# Patient Record
Sex: MALE | Race: WHITE | NOT HISPANIC OR LATINO | Employment: OTHER | URBAN - METROPOLITAN AREA
[De-identification: names, ages, dates, MRNs, and addresses within clinical notes are randomized per-mention and may not be internally consistent; named-entity substitution may affect disease eponyms.]

---

## 2017-12-21 ENCOUNTER — HOSPITAL ENCOUNTER (OUTPATIENT)
Facility: MEDICAL CENTER | Age: 77
DRG: 028 | End: 2017-12-21
Payer: MEDICARE

## 2017-12-21 ENCOUNTER — HOSPITAL ENCOUNTER (OUTPATIENT)
Dept: RADIOLOGY | Facility: MEDICAL CENTER | Age: 77
End: 2017-12-21

## 2017-12-21 ENCOUNTER — RESOLUTE PROFESSIONAL BILLING HOSPITAL PROF FEE (OUTPATIENT)
Dept: HOSPITALIST | Facility: MEDICAL CENTER | Age: 77
End: 2017-12-21
Payer: MEDICARE

## 2017-12-21 ENCOUNTER — APPOINTMENT (OUTPATIENT)
Dept: RADIOLOGY | Facility: MEDICAL CENTER | Age: 77
DRG: 028 | End: 2017-12-21
Attending: PHYSICIAN ASSISTANT
Payer: MEDICARE

## 2017-12-21 ENCOUNTER — HOSPITAL ENCOUNTER (INPATIENT)
Facility: MEDICAL CENTER | Age: 77
LOS: 2 days | DRG: 028 | End: 2017-12-25
Attending: HOSPITALIST | Admitting: HOSPITALIST
Payer: MEDICARE

## 2017-12-21 DIAGNOSIS — G95.89 CERVICAL CORD MYELOMALACIA (HCC): ICD-10-CM

## 2017-12-21 PROBLEM — Z87.891 SMOKING HISTORY: Status: ACTIVE | Noted: 2017-12-21

## 2017-12-21 PROBLEM — Z01.818 PREOPERATIVE EVALUATION TO RULE OUT SURGICAL CONTRAINDICATION: Status: ACTIVE | Noted: 2017-12-21

## 2017-12-21 LAB
ALBUMIN SERPL BCP-MCNC: 3.8 G/DL (ref 3.2–4.9)
ALBUMIN/GLOB SERPL: 1.5 G/DL
ALP SERPL-CCNC: 50 U/L (ref 30–99)
ALT SERPL-CCNC: 14 U/L (ref 2–50)
ANION GAP SERPL CALC-SCNC: 7 MMOL/L (ref 0–11.9)
APTT PPP: 28.1 SEC (ref 24.7–36)
AST SERPL-CCNC: 20 U/L (ref 12–45)
BASOPHILS # BLD AUTO: 0.5 % (ref 0–1.8)
BASOPHILS # BLD: 0.04 K/UL (ref 0–0.12)
BILIRUB SERPL-MCNC: 0.4 MG/DL (ref 0.1–1.5)
BUN SERPL-MCNC: 16 MG/DL (ref 8–22)
CALCIUM SERPL-MCNC: 9.7 MG/DL (ref 8.5–10.5)
CHLORIDE SERPL-SCNC: 109 MMOL/L (ref 96–112)
CO2 SERPL-SCNC: 26 MMOL/L (ref 20–33)
CREAT SERPL-MCNC: 0.7 MG/DL (ref 0.5–1.4)
EOSINOPHIL # BLD AUTO: 0.13 K/UL (ref 0–0.51)
EOSINOPHIL NFR BLD: 1.7 % (ref 0–6.9)
ERYTHROCYTE [DISTWIDTH] IN BLOOD BY AUTOMATED COUNT: 46.3 FL (ref 35.9–50)
GFR SERPL CREATININE-BSD FRML MDRD: >60 ML/MIN/1.73 M 2
GLOBULIN SER CALC-MCNC: 2.5 G/DL (ref 1.9–3.5)
GLUCOSE SERPL-MCNC: 154 MG/DL (ref 65–99)
HCT VFR BLD AUTO: 47.5 % (ref 42–52)
HGB BLD-MCNC: 16.5 G/DL (ref 14–18)
IMM GRANULOCYTES # BLD AUTO: 0.08 K/UL (ref 0–0.11)
IMM GRANULOCYTES NFR BLD AUTO: 1 % (ref 0–0.9)
INR PPP: 0.98 (ref 0.87–1.13)
LYMPHOCYTES # BLD AUTO: 1.64 K/UL (ref 1–4.8)
LYMPHOCYTES NFR BLD: 20.9 % (ref 22–41)
MAGNESIUM SERPL-MCNC: 1.8 MG/DL (ref 1.5–2.5)
MCH RBC QN AUTO: 33.5 PG (ref 27–33)
MCHC RBC AUTO-ENTMCNC: 34.7 G/DL (ref 33.7–35.3)
MCV RBC AUTO: 96.5 FL (ref 81.4–97.8)
MONOCYTES # BLD AUTO: 0.74 K/UL (ref 0–0.85)
MONOCYTES NFR BLD AUTO: 9.4 % (ref 0–13.4)
NEUTROPHILS # BLD AUTO: 5.21 K/UL (ref 1.82–7.42)
NEUTROPHILS NFR BLD: 66.5 % (ref 44–72)
NRBC # BLD AUTO: 0 K/UL
NRBC BLD-RTO: 0 /100 WBC
PHOSPHATE SERPL-MCNC: 3.4 MG/DL (ref 2.5–4.5)
PLATELET # BLD AUTO: 230 K/UL (ref 164–446)
PMV BLD AUTO: 9.3 FL (ref 9–12.9)
POTASSIUM SERPL-SCNC: 3.7 MMOL/L (ref 3.6–5.5)
PROT SERPL-MCNC: 6.3 G/DL (ref 6–8.2)
PROTHROMBIN TIME: 12.7 SEC (ref 12–14.6)
RBC # BLD AUTO: 4.92 M/UL (ref 4.7–6.1)
SODIUM SERPL-SCNC: 142 MMOL/L (ref 135–145)
WBC # BLD AUTO: 7.8 K/UL (ref 4.8–10.8)

## 2017-12-21 PROCEDURE — 85610 PROTHROMBIN TIME: CPT

## 2017-12-21 PROCEDURE — 93010 ELECTROCARDIOGRAM REPORT: CPT | Performed by: INTERNAL MEDICINE

## 2017-12-21 PROCEDURE — 36415 COLL VENOUS BLD VENIPUNCTURE: CPT

## 2017-12-21 PROCEDURE — 83735 ASSAY OF MAGNESIUM: CPT

## 2017-12-21 PROCEDURE — 85730 THROMBOPLASTIN TIME PARTIAL: CPT

## 2017-12-21 PROCEDURE — 80053 COMPREHEN METABOLIC PANEL: CPT

## 2017-12-21 PROCEDURE — 84100 ASSAY OF PHOSPHORUS: CPT

## 2017-12-21 PROCEDURE — 99220 PR INITIAL OBSERVATION CARE,LEVL III: CPT | Performed by: HOSPITALIST

## 2017-12-21 PROCEDURE — 85025 COMPLETE CBC W/AUTO DIFF WBC: CPT

## 2017-12-21 PROCEDURE — 93005 ELECTROCARDIOGRAM TRACING: CPT | Performed by: HOSPITALIST

## 2017-12-21 PROCEDURE — G0378 HOSPITAL OBSERVATION PER HR: HCPCS

## 2017-12-21 PROCEDURE — 700105 HCHG RX REV CODE 258: Performed by: HOSPITALIST

## 2017-12-21 PROCEDURE — 71020 DX-CHEST-2 VIEWS: CPT

## 2017-12-21 RX ORDER — AMOXICILLIN 250 MG
2 CAPSULE ORAL 2 TIMES DAILY
Status: DISCONTINUED | OUTPATIENT
Start: 2017-12-21 | End: 2017-12-22

## 2017-12-21 RX ORDER — ONDANSETRON 2 MG/ML
4 INJECTION INTRAMUSCULAR; INTRAVENOUS EVERY 4 HOURS PRN
Status: DISCONTINUED | OUTPATIENT
Start: 2017-12-21 | End: 2017-12-22

## 2017-12-21 RX ORDER — ACETAMINOPHEN 325 MG/1
650 TABLET ORAL EVERY 6 HOURS PRN
Status: DISCONTINUED | OUTPATIENT
Start: 2017-12-21 | End: 2017-12-25 | Stop reason: HOSPADM

## 2017-12-21 RX ORDER — BISACODYL 10 MG
10 SUPPOSITORY, RECTAL RECTAL
Status: DISCONTINUED | OUTPATIENT
Start: 2017-12-21 | End: 2017-12-22

## 2017-12-21 RX ORDER — OXYCODONE HYDROCHLORIDE 5 MG/1
5 TABLET ORAL
Status: DISCONTINUED | OUTPATIENT
Start: 2017-12-21 | End: 2017-12-25 | Stop reason: HOSPADM

## 2017-12-21 RX ORDER — POLYETHYLENE GLYCOL 3350 17 G/17G
1 POWDER, FOR SOLUTION ORAL
Status: DISCONTINUED | OUTPATIENT
Start: 2017-12-21 | End: 2017-12-22

## 2017-12-21 RX ORDER — ONDANSETRON 4 MG/1
4 TABLET, ORALLY DISINTEGRATING ORAL EVERY 4 HOURS PRN
Status: DISCONTINUED | OUTPATIENT
Start: 2017-12-21 | End: 2017-12-22

## 2017-12-21 RX ORDER — NIACIN 250 MG
250 TABLET ORAL DAILY
Status: ON HOLD | COMMUNITY
End: 2017-12-21

## 2017-12-21 RX ORDER — BISOPROLOL FUMARATE 5 MG/1
5 TABLET, FILM COATED ORAL DAILY
Status: DISCONTINUED | OUTPATIENT
Start: 2017-12-22 | End: 2017-12-25 | Stop reason: HOSPADM

## 2017-12-21 RX ORDER — FLUTICASONE PROPIONATE 50 MCG
2 SPRAY, SUSPENSION (ML) NASAL DAILY
Status: DISCONTINUED | OUTPATIENT
Start: 2017-12-22 | End: 2017-12-25 | Stop reason: HOSPADM

## 2017-12-21 RX ORDER — OXYCODONE HYDROCHLORIDE 5 MG/1
2.5 TABLET ORAL
Status: DISCONTINUED | OUTPATIENT
Start: 2017-12-21 | End: 2017-12-25 | Stop reason: HOSPADM

## 2017-12-21 RX ORDER — FINASTERIDE 5 MG/1
5 TABLET, FILM COATED ORAL DAILY
Status: DISCONTINUED | OUTPATIENT
Start: 2017-12-22 | End: 2017-12-25 | Stop reason: HOSPADM

## 2017-12-21 RX ORDER — MORPHINE SULFATE 4 MG/ML
2 INJECTION, SOLUTION INTRAMUSCULAR; INTRAVENOUS
Status: DISCONTINUED | OUTPATIENT
Start: 2017-12-21 | End: 2017-12-25 | Stop reason: HOSPADM

## 2017-12-21 RX ORDER — SODIUM CHLORIDE 9 MG/ML
INJECTION, SOLUTION INTRAVENOUS CONTINUOUS
Status: DISCONTINUED | OUTPATIENT
Start: 2017-12-21 | End: 2017-12-25

## 2017-12-21 RX ADMIN — SODIUM CHLORIDE: 9 INJECTION, SOLUTION INTRAVENOUS at 20:40

## 2017-12-21 ASSESSMENT — ENCOUNTER SYMPTOMS
TINGLING: 1
DIARRHEA: 0
CONSTIPATION: 0
NECK PAIN: 1
VOMITING: 0
NAUSEA: 0
HEADACHES: 0
COUGH: 0
WHEEZING: 0
BACK PAIN: 1
CHILLS: 0
SHORTNESS OF BREATH: 0
FEVER: 0

## 2017-12-21 ASSESSMENT — LIFESTYLE VARIABLES: EVER_SMOKED: YES

## 2017-12-21 ASSESSMENT — PAIN SCALES - GENERAL
PAINLEVEL_OUTOF10: 3
PAINLEVEL_OUTOF10: 9

## 2017-12-22 ENCOUNTER — APPOINTMENT (OUTPATIENT)
Dept: RADIOLOGY | Facility: MEDICAL CENTER | Age: 77
DRG: 028 | End: 2017-12-22
Attending: NEUROLOGICAL SURGERY
Payer: MEDICARE

## 2017-12-22 LAB
APTT PPP: 28.4 SEC (ref 24.7–36)
EKG IMPRESSION: NORMAL
INR PPP: 1.05 (ref 0.87–1.13)
PROTHROMBIN TIME: 13.4 SEC (ref 12–14.6)

## 2017-12-22 PROCEDURE — A6402 STERILE GAUZE <= 16 SQ IN: HCPCS | Performed by: NEUROLOGICAL SURGERY

## 2017-12-22 PROCEDURE — 700111 HCHG RX REV CODE 636 W/ 250 OVERRIDE (IP)

## 2017-12-22 PROCEDURE — 700105 HCHG RX REV CODE 258: Performed by: HOSPITALIST

## 2017-12-22 PROCEDURE — A9270 NON-COVERED ITEM OR SERVICE: HCPCS

## 2017-12-22 PROCEDURE — 95939 C MOTOR EVOKED UPR&LWR LIMBS: CPT | Performed by: NEUROLOGICAL SURGERY

## 2017-12-22 PROCEDURE — 160035 HCHG PACU - 1ST 60 MINS PHASE I: Performed by: NEUROLOGICAL SURGERY

## 2017-12-22 PROCEDURE — 95940 IONM IN OPERATNG ROOM 15 MIN: CPT | Performed by: NEUROLOGICAL SURGERY

## 2017-12-22 PROCEDURE — 700101 HCHG RX REV CODE 250: Performed by: PHYSICIAN ASSISTANT

## 2017-12-22 PROCEDURE — 95861 NEEDLE EMG 2 EXTREMITIES: CPT | Performed by: NEUROLOGICAL SURGERY

## 2017-12-22 PROCEDURE — 72040 X-RAY EXAM NECK SPINE 2-3 VW: CPT

## 2017-12-22 PROCEDURE — 36415 COLL VENOUS BLD VENIPUNCTURE: CPT

## 2017-12-22 PROCEDURE — A9270 NON-COVERED ITEM OR SERVICE: HCPCS | Performed by: PHYSICIAN ASSISTANT

## 2017-12-22 PROCEDURE — 502000 HCHG MISC OR IMPLANTS RC 0278: Performed by: NEUROLOGICAL SURGERY

## 2017-12-22 PROCEDURE — 500367 HCHG DRAIN KIT, HEMOVAC: Performed by: NEUROLOGICAL SURGERY

## 2017-12-22 PROCEDURE — 160031 HCHG SURGERY MINUTES - 1ST 30 MINS LEVEL 5: Performed by: NEUROLOGICAL SURGERY

## 2017-12-22 PROCEDURE — A4450 NON-WATERPROOF TAPE: HCPCS | Performed by: NEUROLOGICAL SURGERY

## 2017-12-22 PROCEDURE — 160009 HCHG ANES TIME/MIN: Performed by: NEUROLOGICAL SURGERY

## 2017-12-22 PROCEDURE — 160036 HCHG PACU - EA ADDL 30 MINS PHASE I: Performed by: NEUROLOGICAL SURGERY

## 2017-12-22 PROCEDURE — 501838 HCHG SUTURE GENERAL: Performed by: NEUROLOGICAL SURGERY

## 2017-12-22 PROCEDURE — 160042 HCHG SURGERY MINUTES - EA ADDL 1 MIN LEVEL 5: Performed by: NEUROLOGICAL SURGERY

## 2017-12-22 PROCEDURE — 700102 HCHG RX REV CODE 250 W/ 637 OVERRIDE(OP)

## 2017-12-22 PROCEDURE — 95927 SOMATOSENSORY TESTING: CPT | Performed by: NEUROLOGICAL SURGERY

## 2017-12-22 PROCEDURE — 700101 HCHG RX REV CODE 250

## 2017-12-22 PROCEDURE — 160002 HCHG RECOVERY MINUTES (STAT): Performed by: NEUROLOGICAL SURGERY

## 2017-12-22 PROCEDURE — 0RG1071 FUSION OF CERVICAL VERTEBRAL JOINT WITH AUTOLOGOUS TISSUE SUBSTITUTE, POSTERIOR APPROACH, POSTERIOR COLUMN, OPEN APPROACH: ICD-10-PCS | Performed by: NEUROLOGICAL SURGERY

## 2017-12-22 PROCEDURE — 160048 HCHG OR STATISTICAL LEVEL 1-5: Performed by: NEUROLOGICAL SURGERY

## 2017-12-22 PROCEDURE — 502633 HCHG SCREW, VERTEX AXIAL: Performed by: NEUROLOGICAL SURGERY

## 2017-12-22 PROCEDURE — G0378 HOSPITAL OBSERVATION PER HR: HCPCS

## 2017-12-22 PROCEDURE — 700102 HCHG RX REV CODE 250 W/ 637 OVERRIDE(OP): Performed by: HOSPITALIST

## 2017-12-22 PROCEDURE — 95938 SOMATOSENSORY TESTING: CPT | Performed by: NEUROLOGICAL SURGERY

## 2017-12-22 PROCEDURE — 99225 PR SUBSEQUENT OBSERVATION CARE,LEVEL II: CPT | Performed by: HOSPITALIST

## 2017-12-22 PROCEDURE — 500331 HCHG COTTONOID, SURG PATTIE: Performed by: NEUROLOGICAL SURGERY

## 2017-12-22 PROCEDURE — 95937 NEUROMUSCULAR JUNCTION TEST: CPT | Performed by: NEUROLOGICAL SURGERY

## 2017-12-22 PROCEDURE — 85610 PROTHROMBIN TIME: CPT

## 2017-12-22 PROCEDURE — 500122 HCHG BOVIE, BLADE: Performed by: NEUROLOGICAL SURGERY

## 2017-12-22 PROCEDURE — 501317 HCHG SCREW, VERTEX SET: Performed by: NEUROLOGICAL SURGERY

## 2017-12-22 PROCEDURE — 110454 HCHG SHELL REV 250: Performed by: NEUROLOGICAL SURGERY

## 2017-12-22 PROCEDURE — 500885 HCHG PACK, JACKSON TABLE: Performed by: NEUROLOGICAL SURGERY

## 2017-12-22 PROCEDURE — A6222 GAUZE <=16 IN NO W/SAL W/O B: HCPCS | Performed by: NEUROLOGICAL SURGERY

## 2017-12-22 PROCEDURE — 110371 HCHG SHELL REV 272: Performed by: NEUROLOGICAL SURGERY

## 2017-12-22 PROCEDURE — A4314 CATH W/DRAINAGE 2-WAY LATEX: HCPCS | Performed by: NEUROLOGICAL SURGERY

## 2017-12-22 PROCEDURE — 700111 HCHG RX REV CODE 636 W/ 250 OVERRIDE (IP): Performed by: PHYSICIAN ASSISTANT

## 2017-12-22 PROCEDURE — 00NW0ZZ RELEASE CERVICAL SPINAL CORD, OPEN APPROACH: ICD-10-PCS | Performed by: NEUROLOGICAL SURGERY

## 2017-12-22 PROCEDURE — 700102 HCHG RX REV CODE 250 W/ 637 OVERRIDE(OP): Performed by: PHYSICIAN ASSISTANT

## 2017-12-22 PROCEDURE — 85730 THROMBOPLASTIN TIME PARTIAL: CPT

## 2017-12-22 PROCEDURE — A9270 NON-COVERED ITEM OR SERVICE: HCPCS | Performed by: HOSPITALIST

## 2017-12-22 PROCEDURE — 500444 HCHG HEMOSTAT, SURGICEL 2X3: Performed by: NEUROLOGICAL SURGERY

## 2017-12-22 DEVICE — ROD PRE-CUT/BENT 3.5MM X 25MM (1TCONX2=2): Type: IMPLANTABLE DEVICE | Status: FUNCTIONAL

## 2017-12-22 DEVICE — IMPLANTABLE DEVICE: Type: IMPLANTABLE DEVICE | Status: FUNCTIONAL

## 2017-12-22 DEVICE — SCREW SET VERTEX (1TCONX30=30): Type: IMPLANTABLE DEVICE | Status: FUNCTIONAL

## 2017-12-22 DEVICE — SCREW MAS VERTEX 3.5X14MM (1TCONX16=16): Type: IMPLANTABLE DEVICE | Status: FUNCTIONAL

## 2017-12-22 RX ORDER — CYCLOBENZAPRINE HCL 10 MG
10 TABLET ORAL EVERY 8 HOURS PRN
Status: DISCONTINUED | OUTPATIENT
Start: 2017-12-22 | End: 2017-12-25 | Stop reason: HOSPADM

## 2017-12-22 RX ORDER — AMOXICILLIN 250 MG
1 CAPSULE ORAL
Status: DISCONTINUED | OUTPATIENT
Start: 2017-12-22 | End: 2017-12-25 | Stop reason: HOSPADM

## 2017-12-22 RX ORDER — LABETALOL HYDROCHLORIDE 5 MG/ML
INJECTION, SOLUTION INTRAVENOUS
Status: COMPLETED
Start: 2017-12-22 | End: 2017-12-22

## 2017-12-22 RX ORDER — SODIUM CHLORIDE 9 MG/ML
INJECTION, SOLUTION INTRAVENOUS
Status: DISCONTINUED | OUTPATIENT
Start: 2017-12-22 | End: 2017-12-22 | Stop reason: HOSPADM

## 2017-12-22 RX ORDER — DIPHENHYDRAMINE HCL 25 MG
25 TABLET ORAL EVERY 6 HOURS PRN
Status: DISCONTINUED | OUTPATIENT
Start: 2017-12-22 | End: 2017-12-25 | Stop reason: HOSPADM

## 2017-12-22 RX ORDER — SODIUM CHLORIDE, SODIUM LACTATE, POTASSIUM CHLORIDE, AND CALCIUM CHLORIDE .6; .31; .03; .02 G/100ML; G/100ML; G/100ML; G/100ML
IRRIGANT IRRIGATION
Status: DISCONTINUED | OUTPATIENT
Start: 2017-12-22 | End: 2017-12-22 | Stop reason: HOSPADM

## 2017-12-22 RX ORDER — ENEMA 19; 7 G/133ML; G/133ML
1 ENEMA RECTAL
Status: DISCONTINUED | OUTPATIENT
Start: 2017-12-22 | End: 2017-12-25 | Stop reason: HOSPADM

## 2017-12-22 RX ORDER — DOCUSATE SODIUM 100 MG/1
100 CAPSULE, LIQUID FILLED ORAL 2 TIMES DAILY
Status: DISCONTINUED | OUTPATIENT
Start: 2017-12-22 | End: 2017-12-25 | Stop reason: HOSPADM

## 2017-12-22 RX ORDER — DIPHENHYDRAMINE HYDROCHLORIDE 50 MG/ML
25 INJECTION INTRAMUSCULAR; INTRAVENOUS EVERY 6 HOURS PRN
Status: DISCONTINUED | OUTPATIENT
Start: 2017-12-22 | End: 2017-12-25 | Stop reason: HOSPADM

## 2017-12-22 RX ORDER — HYDROCODONE BITARTRATE AND ACETAMINOPHEN 10; 325 MG/1; MG/1
1-2 TABLET ORAL EVERY 4 HOURS PRN
Status: DISCONTINUED | OUTPATIENT
Start: 2017-12-22 | End: 2017-12-25 | Stop reason: HOSPADM

## 2017-12-22 RX ORDER — SODIUM CHLORIDE AND POTASSIUM CHLORIDE 150; 900 MG/100ML; MG/100ML
INJECTION, SOLUTION INTRAVENOUS CONTINUOUS
Status: DISCONTINUED | OUTPATIENT
Start: 2017-12-22 | End: 2017-12-25

## 2017-12-22 RX ORDER — BISACODYL 10 MG
10 SUPPOSITORY, RECTAL RECTAL
Status: DISCONTINUED | OUTPATIENT
Start: 2017-12-22 | End: 2017-12-25 | Stop reason: HOSPADM

## 2017-12-22 RX ORDER — ACETAMINOPHEN 500 MG
500 TABLET ORAL EVERY 6 HOURS
Status: DISCONTINUED | OUTPATIENT
Start: 2017-12-22 | End: 2017-12-25 | Stop reason: HOSPADM

## 2017-12-22 RX ORDER — OXYCODONE AND ACETAMINOPHEN 10; 325 MG/1; MG/1
1-2 TABLET ORAL EVERY 6 HOURS PRN
Status: DISCONTINUED | OUTPATIENT
Start: 2017-12-22 | End: 2017-12-25 | Stop reason: HOSPADM

## 2017-12-22 RX ORDER — ONDANSETRON 4 MG/1
4 TABLET, ORALLY DISINTEGRATING ORAL EVERY 4 HOURS PRN
Status: DISCONTINUED | OUTPATIENT
Start: 2017-12-22 | End: 2017-12-25 | Stop reason: HOSPADM

## 2017-12-22 RX ORDER — MORPHINE SULFATE 4 MG/ML
2 INJECTION, SOLUTION INTRAMUSCULAR; INTRAVENOUS EVERY 4 HOURS PRN
Status: DISCONTINUED | OUTPATIENT
Start: 2017-12-22 | End: 2017-12-25 | Stop reason: HOSPADM

## 2017-12-22 RX ORDER — BUPIVACAINE HYDROCHLORIDE AND EPINEPHRINE 5; 5 MG/ML; UG/ML
INJECTION, SOLUTION EPIDURAL; INTRACAUDAL; PERINEURAL
Status: DISCONTINUED | OUTPATIENT
Start: 2017-12-22 | End: 2017-12-22 | Stop reason: HOSPADM

## 2017-12-22 RX ORDER — CLONIDINE HYDROCHLORIDE 0.1 MG/1
0.1 TABLET ORAL EVERY 4 HOURS PRN
Status: DISCONTINUED | OUTPATIENT
Start: 2017-12-22 | End: 2017-12-25 | Stop reason: HOSPADM

## 2017-12-22 RX ORDER — OXYCODONE HCL 5 MG/5 ML
SOLUTION, ORAL ORAL
Status: COMPLETED
Start: 2017-12-22 | End: 2017-12-22

## 2017-12-22 RX ORDER — POLYETHYLENE GLYCOL 3350 17 G/17G
1 POWDER, FOR SOLUTION ORAL 2 TIMES DAILY PRN
Status: DISCONTINUED | OUTPATIENT
Start: 2017-12-22 | End: 2017-12-25 | Stop reason: HOSPADM

## 2017-12-22 RX ORDER — TEMAZEPAM 15 MG/1
15 CAPSULE ORAL
Status: DISCONTINUED | OUTPATIENT
Start: 2017-12-23 | End: 2017-12-25 | Stop reason: HOSPADM

## 2017-12-22 RX ORDER — CEFAZOLIN SODIUM 2 G/100ML
2 INJECTION, SOLUTION INTRAVENOUS EVERY 8 HOURS
Status: COMPLETED | OUTPATIENT
Start: 2017-12-22 | End: 2017-12-24

## 2017-12-22 RX ORDER — BACITRACIN ZINC 500 [USP'U]/G
OINTMENT TOPICAL
Status: DISCONTINUED | OUTPATIENT
Start: 2017-12-22 | End: 2017-12-22 | Stop reason: HOSPADM

## 2017-12-22 RX ORDER — CALCIUM CARBONATE 500 MG/1
500 TABLET, CHEWABLE ORAL 2 TIMES DAILY
Status: DISCONTINUED | OUTPATIENT
Start: 2017-12-22 | End: 2017-12-25 | Stop reason: HOSPADM

## 2017-12-22 RX ORDER — ONDANSETRON 2 MG/ML
4 INJECTION INTRAMUSCULAR; INTRAVENOUS EVERY 4 HOURS PRN
Status: DISCONTINUED | OUTPATIENT
Start: 2017-12-22 | End: 2017-12-25 | Stop reason: HOSPADM

## 2017-12-22 RX ORDER — AMOXICILLIN 250 MG
1 CAPSULE ORAL NIGHTLY
Status: DISCONTINUED | OUTPATIENT
Start: 2017-12-22 | End: 2017-12-25 | Stop reason: HOSPADM

## 2017-12-22 RX ORDER — BACITRACIN 50000 [IU]/1
INJECTION, POWDER, FOR SOLUTION INTRAMUSCULAR
Status: DISCONTINUED | OUTPATIENT
Start: 2017-12-22 | End: 2017-12-22 | Stop reason: HOSPADM

## 2017-12-22 RX ADMIN — CEFAZOLIN SODIUM 2 G: 2 INJECTION, SOLUTION INTRAVENOUS at 22:47

## 2017-12-22 RX ADMIN — POTASSIUM CHLORIDE AND SODIUM CHLORIDE: 900; 150 INJECTION, SOLUTION INTRAVENOUS at 20:50

## 2017-12-22 RX ADMIN — BISOPROLOL FUMARATE 5 MG: 5 TABLET, COATED ORAL at 08:09

## 2017-12-22 RX ADMIN — OXYCODONE HYDROCHLORIDE 10 MG: 5 SOLUTION ORAL at 19:45

## 2017-12-22 RX ADMIN — ACETAMINOPHEN 500 MG: 500 TABLET ORAL at 20:55

## 2017-12-22 RX ADMIN — FINASTERIDE 5 MG: 5 TABLET, FILM COATED ORAL at 08:09

## 2017-12-22 RX ADMIN — SODIUM CHLORIDE: 9 INJECTION, SOLUTION INTRAVENOUS at 08:14

## 2017-12-22 RX ADMIN — FLUTICASONE PROPIONATE 100 MCG: 50 SPRAY, METERED NASAL at 08:08

## 2017-12-22 RX ADMIN — ANTACID TABLETS 500 MG: 500 TABLET, CHEWABLE ORAL at 20:57

## 2017-12-22 RX ADMIN — CHOLECALCIFEROL TAB 25 MCG (1000 UNIT) 5000 UNITS: 25 TAB at 20:54

## 2017-12-22 RX ADMIN — FENTANYL CITRATE 25 MCG: 50 INJECTION, SOLUTION INTRAMUSCULAR; INTRAVENOUS at 19:48

## 2017-12-22 ASSESSMENT — PAIN SCALES - GENERAL
PAINLEVEL_OUTOF10: 5
PAINLEVEL_OUTOF10: 2
PAINLEVEL_OUTOF10: 5
PAINLEVEL_OUTOF10: 2
PAINLEVEL_OUTOF10: 5
PAINLEVEL_OUTOF10: 2
PAINLEVEL_OUTOF10: 5
PAINLEVEL_OUTOF10: 2
PAINLEVEL_OUTOF10: 2

## 2017-12-22 ASSESSMENT — ENCOUNTER SYMPTOMS
NECK PAIN: 1
NEUROLOGICAL NEGATIVE: 1
PSYCHIATRIC NEGATIVE: 1
MYALGIAS: 1
CONSTITUTIONAL NEGATIVE: 1
CARDIOVASCULAR NEGATIVE: 1
EYES NEGATIVE: 1

## 2017-12-22 NOTE — ASSESSMENT & PLAN NOTE
- Does have a 20-pack-year smoking history  - Labs today are unremarkable  - No significant cardiac history other than hypertension and hyperlipidemia  - Patient is low to moderate risk for moderate risk surgery

## 2017-12-22 NOTE — PROGRESS NOTES
Neurosurgery Progress Note    HPI:   Bartolo is a very pleasant right handed 78 yo male who presented to our neurosurgical clinic on 17 after experiencing one month of neck pain, progressive numbness and tingling in his bilateral hands and feet, weakness in his bilateral hands, and difficulty with walking. He states that symptoms began approximately one month ago with neck pain, left shoulder pain, and left scapular pain. This quickly progressed and about 10 days ago he started dropping things out of his hands, he had difficulty walking and required the use of a front wheel walker for assistance with ambulation. Cervical MRI shows severe myelomalacia. He has not seen his primary care physician in some time, therefore we requested for hospitalist admission for medical clearance prior to urgent cervical surgery to be done today. This will include C3-C6 laminectomy, instrumentation, and fusion.    Subjective:  NPO since midnight.   Reports numbness/tingling in bilateral hands and toes, neck pain.   No fevers, chill, nausea, vomitng, SOB, chest pain.    Exam:  VSS  A&Ox4, NAD   Trachea midline  No nuchal rigidity   NM: 4+/5 deltoid, biceps, triceps, 4/5 right handgrip, 4-/5 left handgrip, 4-/5 intrinsics bilaterally.  Sensation intact and equal throughout all four extremities.   Abdomen: soft, non-tender  Pulmonary: non-labored breathing on room air, normal respiratory effort  No LE edema, erythema, cyanosis, clubbing  Calves non-tender to compression bilat      BP  Min: 113/54  Max: 158/97  Pulse  Av.8  Min: 57  Max: 65  Resp  Av.8  Min: 16  Max: 20  Temp  Av.8 °C (98.2 °F)  Min: 36.3 °C (97.4 °F)  Max: 37.2 °C (98.9 °F)  SpO2  Av.8 %  Min: 90 %  Max: 93 %    No Data Recorded    Recent Labs      17   WBC  7.8   RBC  4.92   HEMOGLOBIN  16.5   HEMATOCRIT  47.5   MCV  96.5   MCH  33.5*   MCHC  34.7   RDW  46.3   PLATELETCT  230   MPV  9.3     Recent Labs      17   SODIUM   142   POTASSIUM  3.7   CHLORIDE  109   CO2  26   GLUCOSE  154*   BUN  16     Recent Labs      12/21/17   1938  12/22/17   0359   APTT  28.1  28.4   INR  0.98  1.05           Intake/Output       12/21/17 0700 - 12/22/17 0659 12/22/17 0700 - 12/23/17 0659      4634-1169 1071-3688 Total 0992-5170 8598-2707 Total       Intake    P.O.  --  120 120  --  -- --    P.O. -- 120 120 -- -- --    I.V.  --  830 830  --  -- --    IV Volume (< Enter Name Here >) -- 830 830 -- -- --    Total Intake -- 950 950 -- -- --       Output    Total Output -- -- -- -- -- --       Net I/O     -- 950 950 -- -- --            Intake/Output Summary (Last 24 hours) at 12/22/17 0816  Last data filed at 12/22/17 0500   Gross per 24 hour   Intake              950 ml   Output                0 ml   Net              950 ml            • fluticasone  2 Spray DAILY   • finasteride  5 mg DAILY   • bisoprolol  5 mg DAILY   • senna-docusate  2 Tab BID    And   • polyethylene glycol/lytes  1 Packet QDAY PRN    And   • magnesium hydroxide  30 mL QDAY PRN    And   • bisacodyl  10 mg QDAY PRN   • NS   Continuous   • ondansetron  4 mg Q4HRS PRN   • ondansetron  4 mg Q4HRS PRN   • acetaminophen  650 mg Q6HRS PRN   • Pharmacy Consult Request   PRN    And   • oxycodone immediate-release  2.5 mg Q3HRS PRN    And   • oxycodone immediate-release  5 mg Q3HRS PRN    And   • morphine injection  2 mg Q3HRS PRN       Assessment and Plan:  Hospital day #2  Prophylactic anticoagulation: no    Plan:   Labs reviewed- coags normal, CBC/BMP WNL.  ECG- sinus bradycardia with RBBB/LAFB. Will be available for Aa.  CXR with some lung hyperinflation, otherwise normal.   Consent obtained for C3-C6 laminectomy, instrumentation, and fusion.  Continue NPO until surgery this afternoon.   Continue with pain control.     Case discussed with Dr. Forbes.

## 2017-12-22 NOTE — CARE PLAN
Problem: Safety  Goal: Will remain free from injury  Outcome: PROGRESSING AS EXPECTED  Call light in reach,bed in low position, brakes locked. No new injuries observed.

## 2017-12-22 NOTE — PROGRESS NOTES
[facility] Hospitalist Progress Note    Date of Service: 2017    Chief Complaint  77 y.o. male admitted 2017 with neck pain    Interval Problem Update  Npo    For neck surgery today    Neck pain, intensity 5/10, spasm, sharp.    afebrile    Consultants/Specialty  Dr garcia    Disposition  pending        Review of Systems   Constitutional: Negative.    HENT: Negative.    Eyes: Negative.    Cardiovascular: Negative.    Musculoskeletal: Positive for myalgias and neck pain.   Skin: Negative.    Neurological: Negative.    Psychiatric/Behavioral: Negative.       Physical Exam  Laboratory/Imaging   Hemodynamics  Temp (24hrs), Av.7 °C (98.1 °F), Min:36.3 °C (97.4 °F), Max:37.2 °C (98.9 °F)   Temperature: 36.7 °C (98 °F)  Pulse  Av  Min: 57  Max: 65    Blood Pressure : 137/87      Respiratory      Respiration: 19, Pulse Oximetry: 92 %             Fluids    Intake/Output Summary (Last 24 hours) at 17 1103  Last data filed at 17 0500   Gross per 24 hour   Intake              950 ml   Output                0 ml   Net              950 ml       Nutrition  Orders Placed This Encounter   Procedures   • DIET NPO     Standing Status:   Standing     Number of Occurrences:   1     Order Specific Question:   Restrict to:     Answer:   Sips with Medications [3]     Physical Exam   Constitutional: He is oriented to person, place, and time. He appears distressed.   Eyes: Left eye exhibits no discharge.   Neck: No JVD present. No tracheal deviation present. No thyromegaly present.   Cardiovascular: Normal rate.  Exam reveals no gallop and no friction rub.    No murmur heard.  Pulmonary/Chest: Breath sounds normal. No respiratory distress. He has no wheezes. He has no rales.   Abdominal: Soft. Bowel sounds are normal. He exhibits no distension. There is no tenderness. There is no guarding.   Musculoskeletal: He exhibits no edema or deformity.   Neurological: He is alert and oriented to person, place, and time. No  cranial nerve deficit. Coordination normal.   Skin: He is not diaphoretic.       Recent Labs      12/21/17 1938   WBC  7.8   RBC  4.92   HEMOGLOBIN  16.5   HEMATOCRIT  47.5   MCV  96.5   MCH  33.5*   MCHC  34.7   RDW  46.3   PLATELETCT  230   MPV  9.3     Recent Labs      12/21/17 1938   SODIUM  142   POTASSIUM  3.7   CHLORIDE  109   CO2  26   GLUCOSE  154*   BUN  16   CREATININE  0.70   CALCIUM  9.7     Recent Labs      12/21/17 1938 12/22/17 0359   APTT  28.1  28.4   INR  0.98  1.05                  Assessment/Plan     * Cervical cord myelomalacia (CMS-HCC)   Assessment & Plan    - Neurosurg Song consulted  - Nothing by mouth midnight for cervical spinal fusion and decompression today  - Hold aspirin and other anti-coagulation        Preoperative evaluation to rule out surgical contraindication- (present on admission)   Assessment & Plan    - Does have a 20-pack-year smoking history  - Labs today are unremarkable  - No significant cardiac history other than hypertension and hyperlipidemia  - Patient is low to moderate risk for moderate risk surgery        Smoking history- (present on admission)   Assessment & Plan    - 20-pack-year smoking history  - No longer smokes          Quality-Core Measures    Check a m cbc, bmp

## 2017-12-22 NOTE — CARE PLAN
Problem: Communication  Goal: The ability to communicate needs accurately and effectively will improve  Outcome: PROGRESSING AS EXPECTED  Pt updated about POC- surgery this afternoon.    Problem: Safety  Goal: Will remain free from injury  Outcome: PROGRESSING AS EXPECTED  Bed alarm refused, A&O x4, slipper socks, bed locked and in low position, call light and personal belongings with reach, report given to CNA, appropriate signs outside door, hourly rounding in place.     Problem: Pain Management  Goal: Pain level will decrease to patient's comfort goal  Outcome: PROGRESSING AS EXPECTED  Pt rates pain 2/10. Pt denies med.

## 2017-12-22 NOTE — H&P
Hospital Medicine History and Physical      Date of Service  12/21/2017    Chief Complaint  No chief complaint on file.  Severe myelomalacia of the cervical spine    History of Presenting Illness  Jose is a 77 y.o. male w/h/o hypertension and hyperlipidemia who presents with severe myelomalacia of the cervical spine. Patient has been having pain in his hands and neck back and feet for the past several months. It got worse over the past three weeks. He eventually went to see Dr. garcia from neurosurgery who recommended that the patient have a urgent cervical spine fusion/decompression. Thus hospitalist was consulted for direct admission.    Primary Care Physician  CAROL Haile D.O.    Outpatient nsg  Dr. Garcia    Code Status  Full code per patient with family present    Review of Systems  Review of Systems   Constitutional: Negative for chills and fever.   Respiratory: Negative for cough, shortness of breath and wheezing.    Cardiovascular: Negative for chest pain.   Gastrointestinal: Negative for constipation, diarrhea, nausea and vomiting.   Musculoskeletal: Positive for back pain, joint pain and neck pain.   Neurological: Positive for tingling. Negative for headaches.     Please see HPI, all other systems were reviewed and are negative (AMA/CMS criteria)     Past Medical History  Past Medical History:   Diagnosis Date   • Hyperlipidemia    • Hypertension        Surgical History  Past Surgical History:   Procedure Laterality Date   • OTHER ORTHOPEDIC SURGERY         Medications  No current facility-administered medications on file prior to encounter.      Current Outpatient Prescriptions on File Prior to Encounter   Medication Sig Dispense Refill   • bisoprolol (ZEBETA) 5 MG TABS Take 5 mg by mouth every day.     • finasteride (PROSCAR) 5 MG TABS Take 5 mg by mouth every day.     • APAP-Salicylamide-Phenyltolox (/200/20 PO) Take  by mouth.     • aspirin (ASA) 81 MG CHEW Take 81 mg by mouth every day.      • fluticasone (FLONASE) 50 MCG/ACT nasal spray Spray 2 Sprays in nose every day. Each Nostril 16 g 11     Family History  No family history on file.    Social History  Social History   Substance Use Topics   • Smoking status: Former Smoker     Packs/day: 0.00     Years: 30.00     Quit date: 2017   • Smokeless tobacco: Former User   • Alcohol use Yes       Allergies  No Known Allergies     Physical Exam  Laboratory   Hemodynamics  Temp (24hrs), Av.8 °C (98.2 °F), Min:36.4 °C (97.6 °F), Max:37.1 °C (98.8 °F)   Temperature: 37.1 °C (98.8 °F)  Pulse  Av.5  Min: 60  Max: 65    Blood Pressure : 135/80      Respiratory      Respiration: 18, Pulse Oximetry: 90 %             Physical Exam   Constitutional: He is oriented to person, place, and time. He appears well-developed and well-nourished.   HENT:   Head: Normocephalic.   Right Ear: External ear normal.   Left Ear: External ear normal.   Nose: Nose normal.   Eyes: Conjunctivae and EOM are normal. Pupils are equal, round, and reactive to light. Right eye exhibits no discharge. Left eye exhibits no discharge. No scleral icterus.   Neck: Neck supple. No tracheal deviation present.   Cardiovascular: Normal rate.  Exam reveals no gallop and no friction rub.    Pulmonary/Chest: No respiratory distress. He has no wheezes. He has no rales.   Abdominal: Soft. He exhibits no distension. There is no tenderness. There is no rebound and no guarding.   Musculoskeletal: He exhibits no edema.   Neurological: He is alert and oriented to person, place, and time.   Skin: Skin is warm and dry. No rash noted. No erythema.   Psychiatric: He has a normal mood and affect.       Recent Labs      17   WBC  7.8   RBC  4.92   HEMOGLOBIN  16.5   HEMATOCRIT  47.5   MCV  96.5   MCH  33.5*   MCHC  34.7   RDW  46.3   PLATELETCT  230   MPV  9.3     Recent Labs      17   SODIUM  142   POTASSIUM  3.7   CHLORIDE  109   CO2  26   GLUCOSE  154*   BUN  16   CREATININE   0.70   CALCIUM  9.7     Recent Labs      12/21/17 1938   ALTSGPT  14   ASTSGOT  20   ALKPHOSPHAT  50   TBILIRUBIN  0.4   GLUCOSE  154*     Recent Labs      12/21/17 1938   APTT  28.1   INR  0.98             No results found for: TROPONINI    Imaging  DX-CHEST-2 VIEWS    (Results Pending)     EKG  per my independant read:  QTc: 441, HR: 58, sinus bradycardia / RBBB, no ST/T changes     Assessment/Plan     I anticipate this patient is appropriate for observation status at this time.    No new Assessment & Plan notes have been filed under this hospital service since the last note was generated.  Service: Hospital Medicine      Prophylaxis:  SCDs

## 2017-12-22 NOTE — PROGRESS NOTES
Assumed care of pt @0700. Bedside report received. Pt AOX 4. Pt rates pain 2/10. Declines med. PIV running Iv fluids. Pt has been voiding. Pt awaiting surgery. Pt up with 1 person assist. Fall precaution in place. POC discussed with pt, all questions answered at this time. Pt makes needs known, call light within reach, hourly rounding in place.

## 2017-12-22 NOTE — PROGRESS NOTES
"Pt arrived to unit from home. Complained of pain in the neck. Pt alert and oriented. Wife at bedside. Pt stated \" I'm here for a neck surgery tomorrow.\" admission profile completed. Home med list provided by wife. Med rec completed. Vs stable. Will continue to monitor  "

## 2017-12-22 NOTE — CARE PLAN
Problem: Nutritional:  Goal: Achieve adequate nutritional intake  Patient will consume >50% of meals.   Outcome: NOT MET

## 2017-12-22 NOTE — PROGRESS NOTES
Direct admit from MD office. Accepted by Dr. Rueda for cervical HNP w/ myelopathy. Surgery scheduled for 12/22/17.  ADT signed & held, needs to be released upon pt arrival.  No written orders received.

## 2017-12-22 NOTE — DIETARY
Nutrition Services: Weight loss PTA     Pt is a 77 y.o. Male with Dx: HNP (herniated nucleus pulposus) with myelopathy, cervical    Pt admitted 12/21.  Pt was on a regular diet, currently NPO for surgery.  No dietary interventions @ this time.    RD will monitor for PO diet and intake.    Please record pt's height for calculation of BMI.

## 2017-12-23 PROCEDURE — 97162 PT EVAL MOD COMPLEX 30 MIN: CPT

## 2017-12-23 PROCEDURE — 700111 HCHG RX REV CODE 636 W/ 250 OVERRIDE (IP): Performed by: PHYSICIAN ASSISTANT

## 2017-12-23 PROCEDURE — G8987 SELF CARE CURRENT STATUS: HCPCS | Mod: CK

## 2017-12-23 PROCEDURE — G8978 MOBILITY CURRENT STATUS: HCPCS | Mod: CK

## 2017-12-23 PROCEDURE — G0378 HOSPITAL OBSERVATION PER HR: HCPCS

## 2017-12-23 PROCEDURE — 770006 HCHG ROOM/CARE - MED/SURG/GYN SEMI*

## 2017-12-23 PROCEDURE — 700112 HCHG RX REV CODE 229: Performed by: PHYSICIAN ASSISTANT

## 2017-12-23 PROCEDURE — 99232 SBSQ HOSP IP/OBS MODERATE 35: CPT | Performed by: HOSPITALIST

## 2017-12-23 PROCEDURE — 700102 HCHG RX REV CODE 250 W/ 637 OVERRIDE(OP): Performed by: HOSPITALIST

## 2017-12-23 PROCEDURE — A9270 NON-COVERED ITEM OR SERVICE: HCPCS | Performed by: PHYSICIAN ASSISTANT

## 2017-12-23 PROCEDURE — 700102 HCHG RX REV CODE 250 W/ 637 OVERRIDE(OP): Performed by: PHYSICIAN ASSISTANT

## 2017-12-23 PROCEDURE — G8979 MOBILITY GOAL STATUS: HCPCS | Mod: CI

## 2017-12-23 PROCEDURE — 97165 OT EVAL LOW COMPLEX 30 MIN: CPT

## 2017-12-23 PROCEDURE — A9270 NON-COVERED ITEM OR SERVICE: HCPCS | Performed by: HOSPITALIST

## 2017-12-23 RX ADMIN — STANDARDIZED SENNA CONCENTRATE AND DOCUSATE SODIUM 1 TABLET: 8.6; 5 TABLET, FILM COATED ORAL at 20:53

## 2017-12-23 RX ADMIN — OXYCODONE HYDROCHLORIDE 5 MG: 5 TABLET ORAL at 12:15

## 2017-12-23 RX ADMIN — CHOLECALCIFEROL TAB 25 MCG (1000 UNIT) 5000 UNITS: 25 TAB at 09:00

## 2017-12-23 RX ADMIN — OXYCODONE HYDROCHLORIDE 2.5 MG: 5 TABLET ORAL at 20:53

## 2017-12-23 RX ADMIN — OXYCODONE HYDROCHLORIDE 5 MG: 5 TABLET ORAL at 05:33

## 2017-12-23 RX ADMIN — ANTACID TABLETS 500 MG: 500 TABLET, CHEWABLE ORAL at 09:00

## 2017-12-23 RX ADMIN — ACETAMINOPHEN 500 MG: 500 TABLET ORAL at 18:15

## 2017-12-23 RX ADMIN — FINASTERIDE 5 MG: 5 TABLET, FILM COATED ORAL at 09:00

## 2017-12-23 RX ADMIN — CEFAZOLIN SODIUM 2 G: 2 INJECTION, SOLUTION INTRAVENOUS at 18:15

## 2017-12-23 RX ADMIN — BISOPROLOL FUMARATE 5 MG: 5 TABLET, COATED ORAL at 09:00

## 2017-12-23 RX ADMIN — OXYCODONE HYDROCHLORIDE 5 MG: 5 TABLET ORAL at 09:00

## 2017-12-23 RX ADMIN — OXYCODONE HYDROCHLORIDE 5 MG: 5 TABLET ORAL at 15:29

## 2017-12-23 RX ADMIN — DOCUSATE SODIUM 100 MG: 100 CAPSULE ORAL at 20:53

## 2017-12-23 RX ADMIN — CEFAZOLIN SODIUM 2 G: 2 INJECTION, SOLUTION INTRAVENOUS at 12:15

## 2017-12-23 RX ADMIN — CEFAZOLIN SODIUM 2 G: 2 INJECTION, SOLUTION INTRAVENOUS at 04:21

## 2017-12-23 RX ADMIN — ANTACID TABLETS 500 MG: 500 TABLET, CHEWABLE ORAL at 20:53

## 2017-12-23 RX ADMIN — ACETAMINOPHEN 500 MG: 500 TABLET ORAL at 06:00

## 2017-12-23 RX ADMIN — ACETAMINOPHEN 500 MG: 500 TABLET ORAL at 12:15

## 2017-12-23 ASSESSMENT — PAIN SCALES - GENERAL
PAINLEVEL_OUTOF10: 3
PAINLEVEL_OUTOF10: 6
PAINLEVEL_OUTOF10: 6
PAINLEVEL_OUTOF10: 9
PAINLEVEL_OUTOF10: 5
PAINLEVEL_OUTOF10: 6

## 2017-12-23 ASSESSMENT — COGNITIVE AND FUNCTIONAL STATUS - GENERAL
CLIMB 3 TO 5 STEPS WITH RAILING: A LOT
MOVING TO AND FROM BED TO CHAIR: UNABLE
MOVING FROM LYING ON BACK TO SITTING ON SIDE OF FLAT BED: UNABLE
PERSONAL GROOMING: A LITTLE
HELP NEEDED FOR BATHING: A LOT
SUGGESTED CMS G CODE MODIFIER MOBILITY: CL
SUGGESTED CMS G CODE MODIFIER DAILY ACTIVITY: CK
WALKING IN HOSPITAL ROOM: A LITTLE
TOILETING: A LOT
DRESSING REGULAR UPPER BODY CLOTHING: A LITTLE
MOBILITY SCORE: 12
STANDING UP FROM CHAIR USING ARMS: A LITTLE
DRESSING REGULAR LOWER BODY CLOTHING: A LOT
EATING MEALS: A LITTLE
TURNING FROM BACK TO SIDE WHILE IN FLAT BAD: A LOT
DAILY ACTIVITIY SCORE: 15

## 2017-12-23 ASSESSMENT — GAIT ASSESSMENTS
DEVIATION: ATAXIC;BRADYKINETIC;DECREASED HEEL STRIKE;DECREASED TOE OFF
DISTANCE (FEET): 20
ASSISTIVE DEVICE: FRONT WHEEL WALKER
GAIT LEVEL OF ASSIST: MINIMAL ASSIST

## 2017-12-23 ASSESSMENT — ENCOUNTER SYMPTOMS
EYES NEGATIVE: 1
PSYCHIATRIC NEGATIVE: 1
MYALGIAS: 1
CONSTITUTIONAL NEGATIVE: 1
NECK PAIN: 1
CARDIOVASCULAR NEGATIVE: 1
NEUROLOGICAL NEGATIVE: 1

## 2017-12-23 ASSESSMENT — ACTIVITIES OF DAILY LIVING (ADL): TOILETING: INDEPENDENT

## 2017-12-23 NOTE — OR SURGEON
Immediate Post OP Note    PreOp Diagnosis: C3-6 stenosis, C3,4 myelomalacia    PostOp Diagnosis: same Plus C3,4 instability, severe left C3,4 facet arthopathy, anklylosed C4,5, soft bone    Procedure(s):  CERVICAL FUSION POSTERIOR- C3-6 - Wound Class: Clean  LUMBAR LAMINECTOMY DISKECTOMY - Wound Class: Clean    Surgeon(s):  Tacos oFrbes M.D.    Anesthesiologist/Type of Anesthesia:  Anesthesiologist: Jose Contreras M.D./General    Surgical Staff:  Assistant: Dian Anderson P.A.-C.  Circulator: Wendi Alicea R.N.  Relief Circulator: Beatriz Salcido R.N.  Scrub Person: Huseyin Jose  Radiology Technologist: Lori Cyr    Specimens:  * No specimens in log *    Estimated Blood Loss: minimal    Findings: severe stenosis relieved, stable ssep's, emg's    Complications: none        12/22/2017 6:30 PM Tacos Forbes

## 2017-12-23 NOTE — PROGRESS NOTES
Neurosurgery Progress Note    Subjective:  Ambulatory this AM with PT   Voiding, passing flatus  Pain well controlled on oral medications  Denies new pain, numbness, weakness.   Denies HA, positional HA, N/V, dizziness, chest pain, SOB, difficulty breathing, chills  Continues w/ numbness in hands, bottom of feet, better than preop     Exam:  VSS  A&Ox4, NAD  No hoarseness of voice.   Trachea midline, no difficulty swallowing - no coughing or choking while drinking water   No nuchal rigidity   + Hoffmans, 3 beats ankle clonus  NM: 5/5 deltoid, biceps, triceps, handgrip, intrinsics except LUE deltoid 4+, handgrip bilaterally 4 to 4+   Sensation intact all four extremities, decreased to LT, better in comparison with preop   Abdomen: soft, non-tender  Pulmonary: non-labored breathing on room air, normal respiratory effort  No LE edema, erythema, cyanosis, clubbing  Calves non-tender to compression bilat  Incision CDI, no halo sign   C-collar being worn appropriately  Drain intact - 50/8hrs      BP  Min: 123/74  Max: 179/94  Pulse  Av.2  Min: 58  Max: 75  Resp  Av.3  Min: 12  Max: 25  Temp  Av.4 °C (97.6 °F)  Min: 35.9 °C (96.6 °F)  Max: 37.1 °C (98.7 °F)  SpO2  Av.7 %  Min: 91 %  Max: 100 %    No Data Recorded    Recent Labs      17   WBC  7.8   RBC  4.92   HEMOGLOBIN  16.5   HEMATOCRIT  47.5   MCV  96.5   MCH  33.5*   MCHC  34.7   RDW  46.3   PLATELETCT  230   MPV  9.3     Recent Labs      17   SODIUM  142   POTASSIUM  3.7   CHLORIDE  109   CO2  26   GLUCOSE  154*   BUN  16     Recent Labs      17   APTT  28.1  28.4   INR  0.98  1.05           Intake/Output       17 0700 - 17 0659 17 0700 - 17 0659      5930-7049 3407-4682 Total 2132-2407 4019-9702 Total       Intake    P.O.  0  -- 0  --  -- --    P.O. 0 -- 0 -- -- --    I.V.  --   1800  --  -- --    Crystalloid Intake --  1800 -- -- --    Total Intake 0 1800  1800 -- -- --       Output    Urine  750  685 1435  --  -- --    Indwelling Cathether -- 685 685 -- -- --    Void (ml) 750 -- 750 -- -- --    Drains  --  -- --  120  -- 120    Hemovac 1 -- -- -- 120 -- 120    Blood  --  50 50  --  -- --    Est. Blood Loss (mL) -- 50 50 -- -- --    Total Output  120 -- 120       Net I/O     -750 1065 315 -120 -- -120            Intake/Output Summary (Last 24 hours) at 12/23/17 1127  Last data filed at 12/23/17 0720   Gross per 24 hour   Intake             1800 ml   Output             1305 ml   Net              495 ml            • Pharmacy Consult Request  1 Each PRN   • MD CARRERA...Do not administer NSAIDS or ASPIRIN unless ORDERED By Neurosurgery  1 Each PRN   • docusate sodium  100 mg BID   • senna-docusate  1 Tab Nightly   • senna-docusate  1 Tab Q24HRS PRN   • polyethylene glycol/lytes  1 Packet BID PRN   • magnesium hydroxide  30 mL QDAY PRN   • bisacodyl  10 mg Q24HRS PRN   • fleet  1 Each Once PRN   • 0.9 % NaCl with KCl 20 mEq 1,000 mL   Continuous   • acetaminophen  500 mg Q6HRS   • ceFAZolin  2 g Q8HR   • diphenhydrAMINE  25 mg Q6HRS PRN    Or   • diphenhydrAMINE  25 mg Q6HRS PRN   • ondansetron  4 mg Q4HRS PRN   • ondansetron  4 mg Q4HRS PRN   • cyclobenzaprine  10 mg Q8HRS PRN   • temazepam  15 mg HS PRN - MR X 1   • clonidine  0.1 mg Q4HRS PRN   • benzocaine-menthol  1 Lozenge Q2HRS PRN   • calcium carbonate  500 mg BID   • vitamin D  5,000 Units DAILY   • hydrocodone/acetaminophen  1-2 Tab Q4HRS PRN   • oxycodone-acetaminophen  1-2 Tab Q6HRS PRN   • morphine injection  2 mg Q4HRS PRN   • fluticasone  2 Spray DAILY   • finasteride  5 mg DAILY   • bisoprolol  5 mg DAILY   • NS   Continuous   • acetaminophen  650 mg Q6HRS PRN   • Pharmacy Consult Request   PRN    And   • oxycodone immediate-release  2.5 mg Q3HRS PRN    And   • oxycodone immediate-release  5 mg Q3HRS PRN    And   • morphine injection  2 mg Q3HRS PRN       Assessment and Plan:  Hospital day  #3  POD #2 C3-5 LAMI/C3-4 INSTRUMENTAION/FUSION  Prophylactic anticoagulation: no         Start date/time: 24 hours after drain dc'ed.    Patient looks good.  Increase ambulation.  PT/OT.    Patient will most likely need acute inpatient rehab.  Referral placed   Continue pain control, mobilization  D/c drain when meets criteria   Ambulate, work with therapies  Continue incentive spirometry Q1H  Continue stool softeners to encourage BM    Appreciate hospitalist help.

## 2017-12-23 NOTE — PROGRESS NOTES
Bedside report received from Lluvia WASHINGTON.  Patient resting with no s/s of discomfort or distress noted.  Oxygen by NC, IV infusing as ordered, dressing, aspen collar and hemovac intact, pt continues with numbness to hands but improving, fall precautions in place, pt calls for assist.

## 2017-12-23 NOTE — CARE PLAN
Problem: Safety  Goal: Will remain free from injury    Intervention: Provide assistance with mobility  Call light within reach, pt calls for assistance at all times.  Bed in low position and locked, upper bedside rails, proper mobility signs placed, personal possessions within reach, treaded socks on.  Hourly rounding in place.                  Problem: Pain Management  Goal: Pain level will decrease to patient's comfort goal    Intervention: Follow pain managment plan developed in collaboration with patient and Interdisciplinary Team  Pain meds given as needed per orders.

## 2017-12-23 NOTE — PROGRESS NOTES
[facility] Hospitalist Progress Note    Date of Service: 2017    Chief Complaint  77 y.o. male admitted 2017 with neck pain and neck numbness    Interval Problem Update  Pod #1 neck surgery    See OP note      Neck pain, intensity 3/10, spasm, sharp.    afebrile    Consultants/Specialty  Dr garcia    Disposition  pending        Review of Systems   Constitutional: Negative.    HENT: Negative.    Eyes: Negative.    Cardiovascular: Negative.    Musculoskeletal: Positive for myalgias and neck pain.   Skin: Negative.    Neurological: Negative.  Speech change:    Psychiatric/Behavioral: Negative.       Physical Exam  Laboratory/Imaging   Hemodynamics  Temp (24hrs), Av.4 °C (97.6 °F), Min:35.9 °C (96.6 °F), Max:37.1 °C (98.7 °F)   Temperature: 36.6 °C (97.9 °F)  Pulse  Av.1  Min: 57  Max: 75 Heart Rate (Monitored): 64  Blood Pressure : 128/77, Arterial BP: (!) 181/80, NIBP: (!) 170/70      Respiratory      Respiration: 16, Pulse Oximetry: 95 %        RUL Breath Sounds: Clear, RML Breath Sounds: Clear, RLL Breath Sounds: Diminished, LATRICIA Breath Sounds: Clear, LLL Breath Sounds: Diminished    Fluids    Intake/Output Summary (Last 24 hours) at 17 1328  Last data filed at 17 0720   Gross per 24 hour   Intake             1800 ml   Output              855 ml   Net              945 ml       Nutrition  Orders Placed This Encounter   Procedures   • Diet Order     Standing Status:   Standing     Number of Occurrences:   1     Order Specific Question:   Diet:     Answer:   Regular [1]     Physical Exam   Constitutional: He is oriented to person, place, and time. No distress.   HENT:   Neck collar in place   Eyes: Left eye exhibits no discharge.   Neck: No JVD present. No tracheal deviation present. No thyromegaly present.   Cardiovascular: Normal rate.  Exam reveals no gallop and no friction rub.    No murmur heard.  Pulmonary/Chest: Breath sounds normal. No respiratory distress. He has no wheezes. He has no  rales.   Abdominal: Soft. Bowel sounds are normal. He exhibits no distension. There is no tenderness. There is no guarding.   Musculoskeletal: He exhibits no edema or deformity.   Neurological: He is alert and oriented to person, place, and time. No cranial nerve deficit. Coordination normal.   Skin: He is not diaphoretic.       Recent Labs      12/21/17 1938   WBC  7.8   RBC  4.92   HEMOGLOBIN  16.5   HEMATOCRIT  47.5   MCV  96.5   MCH  33.5*   MCHC  34.7   RDW  46.3   PLATELETCT  230   MPV  9.3     Recent Labs      12/21/17 1938   SODIUM  142   POTASSIUM  3.7   CHLORIDE  109   CO2  26   GLUCOSE  154*   BUN  16   CREATININE  0.70   CALCIUM  9.7     Recent Labs      12/21/17 1938 12/22/17   0359   APTT  28.1  28.4   INR  0.98  1.05                  Assessment/Plan     * Cervical cord myelomalacia (CMS-HCC)   Assessment & Plan    Pod # 1  cervical spinal fusion and decompression today          Preoperative evaluation to rule out surgical contraindication- (present on admission)   Assessment & Plan    - Does have a 20-pack-year smoking history  - Labs today are unremarkable  - No significant cardiac history other than hypertension and hyperlipidemia  - Patient is low to moderate risk for moderate risk surgery        Smoking history- (present on admission)   Assessment & Plan    - 20-pack-year smoking history  - No longer smokes        refer to rehab  Quality-Core Measures

## 2017-12-23 NOTE — DISCHARGE PLANNING
Aware of PMR referral from Dian Anderson PA-C. Current chart documentation indicates potential for inpatient rehab. Will forward to [facility] for consult per protocol. Dr. Raul Powers to review. TCC will follow for consult recommendation.

## 2017-12-23 NOTE — CARE PLAN
Problem: Venous Thromboembolism (VTW)/Deep Vein Thrombosis (DVT) Prevention:  Goal: Patient will participate in Venous Thrombosis (VTE)/Deep Vein Thrombosis (DVT)Prevention Measures  Outcome: PROGRESSING AS EXPECTED  SCDs on.    Problem: Pain Management  Goal: Pain level will decrease to patient's comfort goal  Outcome: PROGRESSING AS EXPECTED  Pain currently controlled. Tylenol given once.

## 2017-12-23 NOTE — THERAPY
"Physical Therapy Evaluation completed.   Bed Mobility:  Supine to Sit: Moderate Assist  Transfers: Sit to Stand: Minimal Assist  Gait: Level Of Assist: Minimal Assist with Front-Wheel Walker       Plan of Care: Will benefit from Physical Therapy 5 times per week  Discharge Recommendations: Equipment: Will Continue to Assess for Equipment Needs. Post-acute therapy Discharge to a transitional care facility for continued skilled therapy services.    See \"Rehab Therapy-Acute\" Patient Summary Report for complete documentation.     "

## 2017-12-23 NOTE — PROGRESS NOTES
Aox4. Hypertensive at the start of the shift. Denies nausea. Cervical collar in place. Hemovac to suction. Pain minimal. Declines pain meds at this time. Weakness and numbing BL hands and feet but patient claims it's better than yesterday. Montero catheter in place, draining yellow urine. Patient has been sleeping through the night. SCDs on.

## 2017-12-23 NOTE — PROGRESS NOTES
Neurosurgery Progress Note    Subjective:  Patient denies new complaints of tingling, numbness, weakness after surgery.        Exam:  A/A/O x3, Aspen collar on.    Motor right upper extremity is 5/5, left upper extremity is 4/5 (improved c/w preop), dressing is cdi, + proprioceptive errors    BP  Min: 123/74  Max: 179/94  Pulse  Av.1  Min: 58  Max: 75  Resp  Av.5  Min: 12  Max: 25  Temp  Av.4 °C (97.6 °F)  Min: 35.9 °C (96.6 °F)  Max: 37.1 °C (98.7 °F)  SpO2  Av.6 %  Min: 91 %  Max: 100 %    No Data Recorded    Recent Labs      17   WBC  7.8   RBC  4.92   HEMOGLOBIN  16.5   HEMATOCRIT  47.5   MCV  96.5   MCH  33.5*   MCHC  34.7   RDW  46.3   PLATELETCT  230   MPV  9.3     Recent Labs      17   SODIUM  142   POTASSIUM  3.7   CHLORIDE  109   CO2  26   GLUCOSE  154*   BUN  16     Recent Labs      17   0359   APTT  28.1  28.4   INR  0.98  1.05           Intake/Output       17 0700 - 17 0659 17 0700 - 17 0659      9816-8576 9713-3064 Total 6065-0784 8806-2432 Total       Intake    P.O.  0  -- 0  --  -- --    P.O. 0 -- 0 -- -- --    I.V.  --  1800 1800  --  -- --    Crystalloid Intake -- 1800 1800 -- -- --    Total Intake 0 1800 1800 -- -- --       Output    Urine  750  685 1435  --  -- --    Indwelling Cathether -- 685 685 -- -- --    Void (ml) 750 -- 750 -- -- --    Drains  --  -- --  120  -- 120    Hemovac 1 -- -- -- 120 -- 120    Blood  --  50 50  --  -- --    Est. Blood Loss (mL) -- 50 50 -- -- --    Total Output  120 -- 120       Net I/O     -750 1065 315 -120 -- -120            Intake/Output Summary (Last 24 hours) at 17 0748  Last data filed at 17 0720   Gross per 24 hour   Intake             1800 ml   Output             1605 ml   Net              195 ml            • Pharmacy Consult Request  1 Each PRN   • MD ALERT...Do not administer NSAIDS or ASPIRIN unless ORDERED By Neurosurgery  1 Each PRN   •  docusate sodium  100 mg BID   • senna-docusate  1 Tab Nightly   • senna-docusate  1 Tab Q24HRS PRN   • polyethylene glycol/lytes  1 Packet BID PRN   • magnesium hydroxide  30 mL QDAY PRN   • bisacodyl  10 mg Q24HRS PRN   • fleet  1 Each Once PRN   • 0.9 % NaCl with KCl 20 mEq 1,000 mL   Continuous   • acetaminophen  500 mg Q6HRS   • ceFAZolin  2 g Q8HR   • diphenhydrAMINE  25 mg Q6HRS PRN    Or   • diphenhydrAMINE  25 mg Q6HRS PRN   • ondansetron  4 mg Q4HRS PRN   • ondansetron  4 mg Q4HRS PRN   • cyclobenzaprine  10 mg Q8HRS PRN   • temazepam  15 mg HS PRN - MR X 1   • clonidine  0.1 mg Q4HRS PRN   • benzocaine-menthol  1 Lozenge Q2HRS PRN   • calcium carbonate  500 mg BID   • vitamin D  5,000 Units DAILY   • hydrocodone/acetaminophen  1-2 Tab Q4HRS PRN   • oxycodone-acetaminophen  1-2 Tab Q6HRS PRN   • morphine injection  2 mg Q4HRS PRN   • fluticasone  2 Spray DAILY   • finasteride  5 mg DAILY   • bisoprolol  5 mg DAILY   • NS   Continuous   • acetaminophen  650 mg Q6HRS PRN   • Pharmacy Consult Request   PRN    And   • oxycodone immediate-release  2.5 mg Q3HRS PRN    And   • oxycodone immediate-release  5 mg Q3HRS PRN    And   • morphine injection  2 mg Q3HRS PRN       Assessment and Plan:  Hospital day #2  POD #1 C3-5 LAMI/C3-4 INSTRUMENTAION/FUSION  Prophylactic anticoagulation: no         Start date/time: 24 hours after drain dc'ed.    Patient looks good.  Increase ambulation.  PT/OT.    Patient will most likely need acute inpatient rehab.    Appreciate hospitalist help.

## 2017-12-23 NOTE — OR NURSING
Pt A&O. VSS on 3 LNC. Pt's SBP below anesthesia parameters.  Pt reports pain level at 5-6/10 and states this is tolerable. Denies nausea. Surgical drsg CDI. Hemovac patent and draining.   Report called to receiving RN and pt transferred via bed.

## 2017-12-23 NOTE — OP REPORT
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    PRINCIPAL PROCEDURE PERFORMED:  1.  C3, C4, C5 decompressive laminectomy, bilateral medial facetectomies and   bilateral foraminotomies.  2.  C3-C4 nonsegmental instrumentation with [employer]onic lateral mass screws.  3.  C3-C4 posterolateral fusion.    SURGEON:  Tacos Forbes MD    ASSISTANT:  Dian Anderson PA-C    ANESTHESIA:  The procedure was performed under general anesthesia by Jose Contreras MD.    COMPLICATIONS:  There were no complications.    FINDINGS:  Include evidence of significant spinal canal compromise, nice   decompression and stable SSEPs and EMGs.  Please note that remote monitoring   was performed by neural monitoring associates.    DISPOSITION:  Patient will be extubated and brought to the recovery room.    CLINICAL HISTORY:  The patient is a most pleasant 77-year-old male who   presents with a rapidly progressive quadriparesis and myelopathy.  A month   ago, he was fine.  The patient states approximately 2 weeks ago, he started   losing feeling in his hands and started developing clumsiness and started   falling.  His MRI showed severe spinal canal compromise with myelomalacia in   the spinal cord at C3-C4.  I recommended surgery.  I did not feel that   nonsurgical measures would help him.  The goal of surgery was to keep him from   getting any worse.  Risks, benefits, and options were discussed with the   patient and with his wife in the preoperative holding area.  We discussed   other options such as doing nothing.  They urged me to proceed with surgery.    DESCRIPTION OF PROCEDURE:  The patient was brought to the  operating room and   placed under general anesthesia.  Minimal neck manipulation was performed as a   GlideScope was used.  Baseline SSEPs and EMGs and motor evoked potentials   were obtained as a _____.  The patient was turned prone atop a radiolucent OSI   table, and all pressure points were padded.  A repeat SSEPs, EMGs and MEPs   were repeated and stable.  The suboccipital area along with the neck was   shaved, prepped and draped in usual sterile fashion.  A time out was   performed.  Perioperative IV antibiotics were administered.  Local anesthetic   was infiltrated in the skin.  A midline skin incision was centered over C3 to   C6.  The ligament and nuchae was divided.  Sharp subperiosteal dissection was   performed.  Significant severe facet arthropathy was seen over the left C3-C4   facet joint and left C4-C5 facet joint.  A C4-C5 appeared to be ankylosed.    Therefore, the foraminal stenosis at C5-C6 did not appear to absolutely need   to be decompressed.  I was able to decompress down to C5 without destabilizing   C4-C5.  Therefore, at this juncture, I decided to perform a C3-C5 laminectomy   and C3-C4 instrumentation and fusion.  I predrilled the lateral masses with   the drill and I confirmed that there was no evidence of bicortical breach.  I   placed four 3.5x14 mm screws.  The facet joint was decorticated by using an   upbiting curette.  An exuberant amount of synovium was seen in the left C3-C4   facet joint suggesting instability.  I used an AMA drill bit to thin down the   lamina at C3, C4, and C5.  I used a Kerrison 2 punch to complete a   decompressive laminectomy and bilateral medial facetectomies and bilateral   foraminotomies at C3, C4, C5.  The thecal sac was nicely decompressed and   found to be pulsatile.  Next, I placed the appropriate sized rods and I   tightened the setscrews to the appropriate torque.  I packed the facet joints   with local autograft and also tricalcium phosphate  FormaGraft.  At C3-C4   fusion was thus performed.  Perfect hemostasis was achieved.  The wound was   closed in anatomic layers and a sterile dressing was applied.  Please note, I   will contact the hospitalist to thank them so much for their help with the   admission and further postoperative care.       ____________________________________     MD KATJA HINES / MINERVA    DD:  12/22/2017 18:39:00  DT:  12/22/2017 21:25:20    D#:  [id number]  Job#:  [id number]

## 2017-12-23 NOTE — CONSULTS
Medical chart review completed.     Patient is a 77 y.o. year-old male with a past medical history significant for hypertension and hyperlipidemia admitted to [facility] on 12/21/2017  3:57 PM with symptoms of cervical myelopathy for urgent decompression and fusion by Dr. Forbes.    The patient had reportedly come to see Dr. Forbes in outpatient clinic on December 21 due to a month long history of neck pain, progressive numbness, and weakness in his bilateral hands. He also developed difficulty walking. He was noted to be dropping things and having difficulty with gait. He progressed to using a front wheeled walker for ambulation.    I reviewed the images of an outside MRI of the cervical spine dated December 21, 2017 which appears to show severe spinal stenosis at the C3-C4 level with associated focal T2 cord signal abnormality at that level.    On December 22, 2017, the patient underwent C3-C5 laminectomy with C3-C4 posterior instrumented fusion by Dr. Forbes. Intraoperatively there was notation of severe facet arthropathy at the left C3-4 facet joint and left C4-5 facet joint. There is notation of good decompression of the thecal sac which remained also towel.    Postoperatively, he is doing well. His left upper extremity strength is reportedly 4/5 which is reportedly an improvement over the preoperative exam. He also has proprioceptive deficits per the neurosurgery report. He is wearing an Aspen collar.  He has a Hemovac in place with 120 mL out today.    Notes indicate that prophylactic anticoagulation can be started 24 hours after removal of the drain.    He remains on postoperative Ancef through 1900 on 12/24. Pain is being managed with oral oxycodone. He has not had any fentanyl since 12/22.  He had some postoperative hypertension to 181/80 but today, the blood pressure has improved to 128/77 upon resumption of medications.      He was evaluated by physical therapy on December 23 and was found to be min  assist for bed mobility and gait utilizing a front-wheeled walker. He was evaluated by occupational therapy on December 23 and found to be moderate assist for lower body dressing, toileting, and upper body dressing, and he was maximal assist for bathing.    PMH:  Past Medical History:   Diagnosis Date   • Hyperlipidemia    • Hypertension        PSH:  Past Surgical History:   Procedure Laterality Date   • CERVICAL FUSION POSTERIOR N/A 12/22/2017    Procedure: CERVICAL FUSION POSTERIOR- C3-6;  Surgeon: Tacos Forbes M.D.;  Location: SURGERY [facility];  Service: Neurosurgery   • OTHER ORTHOPEDIC SURGERY         FAMILY HISTORY:  None reported in chart    MEDICATIONS:  Current Facility-Administered Medications   Medication Dose   • Pharmacy Consult Request ...Pain Management Review 1 Each  1 Each   • MD ALERT...Do not administer NSAIDS or ASPIRIN unless ORDERED By Neurosurgery 1 Each  1 Each   • docusate sodium (COLACE) capsule 100 mg  100 mg   • senna-docusate (PERICOLACE or SENOKOT S) 8.6-50 MG per tablet 1 Tab  1 Tab   • senna-docusate (PERICOLACE or SENOKOT S) 8.6-50 MG per tablet 1 Tab  1 Tab   • polyethylene glycol/lytes (MIRALAX) PACKET 1 Packet  1 Packet   • magnesium hydroxide (MILK OF MAGNESIA) suspension 30 mL  30 mL   • bisacodyl (DULCOLAX) suppository 10 mg  10 mg   • fleet enema 133 mL  1 Each   • 0.9 % NaCl with KCl 20 mEq infusion     • acetaminophen (TYLENOL) tablet 500 mg  500 mg   • ceFAZolin (ANCEF) IVPB 2 g  2 g   • diphenhydrAMINE (BENADRYL) tablet/capsule 25 mg  25 mg    Or   • diphenhydrAMINE (BENADRYL) injection 25 mg  25 mg   • ondansetron (ZOFRAN) syringe/vial injection 4 mg  4 mg   • ondansetron (ZOFRAN ODT) dispertab 4 mg  4 mg   • cyclobenzaprine (FLEXERIL) tablet 10 mg  10 mg   • temazepam (RESTORIL) capsule 15 mg  15 mg   • clonidine (CATAPRES) tablet 0.1 mg  0.1 mg   • benzocaine-menthol (CEPACOL) lozenge 1 Lozenge  1 Lozenge   • calcium carbonate (TUMS) chewable tab 500 mg  500  mg   • vitamin D (cholecalciferol) tablet 5,000 Units  5,000 Units   • hydrocodone/acetaminophen (NORCO)  MG per tablet 1-2 Tab  1-2 Tab   • oxycodone-acetaminophen (PERCOCET-10)  MG per tablet 1-2 Tab  1-2 Tab   • morphine (pf) 4 mg/ml injection 2 mg  2 mg   • fluticasone (FLONASE) nasal spray 100 mcg  2 Spray   • finasteride (PROSCAR) tablet 5 mg  5 mg   • bisoprolol (ZEBETA) tablet 5 mg  5 mg   • NS infusion     • acetaminophen (TYLENOL) tablet 650 mg  650 mg   • Pharmacy Consult Request ...Pain Management Review      And   • oxycodone immediate-release (ROXICODONE) tablet 2.5 mg  2.5 mg    And   • oxycodone immediate-release (ROXICODONE) tablet 5 mg  5 mg    And   • morphine (pf) 4 mg/ml injection 2 mg  2 mg       ALLERGIES:  Patient has no known allergies.    PSYCHOSOCIAL HISTORY:  Living Site:  Home  Living With:  spouse  Caregiver's availability: His wife is not able to provide significant physical assistance due to her back issues.  Number of stairs:  2  Substance use history: He is a former smoker with 20 pack years and drinks alcohol occasionally.      The patient presents  with  functional deficits in mobility/self-cares, and Minimal  de-conditioning. Pre-morbidly, this patient lived in a single level home with Two steps to enter,with spouse  The patient was evaluated by acute care Physical Therapy and Occupational Therapy; currently requiring minimal assistance for mobility and moderate assistance for ADLs. The patient's current diet is Regular with Thin liquids.     The patient is   a Very Good candidate for an acute inpatient rehabilitation program with a coordinated program of care at an intensity and frequency not available at a lower level of care when medically cleared and insurance has authorized.     The patient must be off of IV pain medications prior to admission for rehabilitation.    Note: if the patient continues to improve while waiting for medical clearance/insurance  authorization, and no longer requires 2 of of 3 therapy services (PT/OT/SLP), the patient will no longer meet criteria for acute inpatient rehabilitation.    This recommendation is substantiated by the patient's current medical condition with intervention and assessment of medical issues requiring an acute level of care for patient's safety and maximum outcome. A coordinated program of care will be provided by an interdisciplinary team including physical therapy, occupational therapy, physiatry, rehab nursing and rehab psychology. Rehab goals include improved mobility, self-care management, strength and conditioning/endurance, pain management, bowel and bladder management, mood and affect, and safety with independent home management including caregiver training.     Estimated length of stay is approximately 10-14 days. Rehab potential: Very Good. Disposition: to pre-morbid independent living setting with supportive care of patient's spouse. We will continue to follow with you in anticipation of discharge to acute inpatient rehabilitation when medically stable to do so at the discretion of him attending physician. Thank you for the consultation. Please call with any questions regarding this recommendation.    Raul Powers M.D.  Spinal Cord Injury Medicine

## 2017-12-23 NOTE — THERAPY
"Occupational Therapy Evaluation completed.   Functional Status:  Min to mod assist for self-care due to UE and LE weakness and C-spine precautions   Plan of Care: Will benefit from Occupational Therapy 4 times per week  Discharge Recommendations:  Equipment: Will Continue to Assess for Equipment Needs. Post-acute therapy Discharge to a transitional care facility for continued skilled therapy services.    See \"Rehab Therapy-Acute\" Patient Summary Report for complete documentation.    "

## 2017-12-24 PROCEDURE — 700102 HCHG RX REV CODE 250 W/ 637 OVERRIDE(OP): Performed by: PHYSICIAN ASSISTANT

## 2017-12-24 PROCEDURE — 700111 HCHG RX REV CODE 636 W/ 250 OVERRIDE (IP): Performed by: HOSPITALIST

## 2017-12-24 PROCEDURE — 3E0234Z INTRODUCTION OF SERUM, TOXOID AND VACCINE INTO MUSCLE, PERCUTANEOUS APPROACH: ICD-10-PCS | Performed by: HOSPITALIST

## 2017-12-24 PROCEDURE — 90471 IMMUNIZATION ADMIN: CPT

## 2017-12-24 PROCEDURE — 700112 HCHG RX REV CODE 229: Performed by: PHYSICIAN ASSISTANT

## 2017-12-24 PROCEDURE — A9270 NON-COVERED ITEM OR SERVICE: HCPCS | Performed by: PHYSICIAN ASSISTANT

## 2017-12-24 PROCEDURE — 700102 HCHG RX REV CODE 250 W/ 637 OVERRIDE(OP): Performed by: HOSPITALIST

## 2017-12-24 PROCEDURE — A9270 NON-COVERED ITEM OR SERVICE: HCPCS | Performed by: HOSPITALIST

## 2017-12-24 PROCEDURE — 770006 HCHG ROOM/CARE - MED/SURG/GYN SEMI*

## 2017-12-24 PROCEDURE — 90670 PCV13 VACCINE IM: CPT | Performed by: HOSPITALIST

## 2017-12-24 PROCEDURE — 700111 HCHG RX REV CODE 636 W/ 250 OVERRIDE (IP): Performed by: PHYSICIAN ASSISTANT

## 2017-12-24 PROCEDURE — 99232 SBSQ HOSP IP/OBS MODERATE 35: CPT | Performed by: HOSPITALIST

## 2017-12-24 RX ADMIN — FLUTICASONE PROPIONATE 100 MCG: 50 SPRAY, METERED NASAL at 09:18

## 2017-12-24 RX ADMIN — CEFAZOLIN SODIUM 2 G: 2 INJECTION, SOLUTION INTRAVENOUS at 03:02

## 2017-12-24 RX ADMIN — ACETAMINOPHEN 500 MG: 500 TABLET ORAL at 00:14

## 2017-12-24 RX ADMIN — STANDARDIZED SENNA CONCENTRATE AND DOCUSATE SODIUM 1 TABLET: 8.6; 5 TABLET, FILM COATED ORAL at 19:47

## 2017-12-24 RX ADMIN — ANTACID TABLETS 500 MG: 500 TABLET, CHEWABLE ORAL at 19:47

## 2017-12-24 RX ADMIN — FINASTERIDE 5 MG: 5 TABLET, FILM COATED ORAL at 09:17

## 2017-12-24 RX ADMIN — BISOPROLOL FUMARATE 5 MG: 5 TABLET, COATED ORAL at 09:17

## 2017-12-24 RX ADMIN — DOCUSATE SODIUM 100 MG: 100 CAPSULE ORAL at 19:00

## 2017-12-24 RX ADMIN — CHOLECALCIFEROL TAB 25 MCG (1000 UNIT) 5000 UNITS: 25 TAB at 09:17

## 2017-12-24 RX ADMIN — PNEUMOCOCCAL 13-VALENT CONJUGATE VACCINE 0.5 ML: 2.2; 2.2; 2.2; 2.2; 2.2; 4.4; 2.2; 2.2; 2.2; 2.2; 2.2; 2.2; 2.2 INJECTION, SUSPENSION INTRAMUSCULAR at 16:28

## 2017-12-24 RX ADMIN — ACETAMINOPHEN 500 MG: 500 TABLET ORAL at 05:35

## 2017-12-24 RX ADMIN — ACETAMINOPHEN 500 MG: 500 TABLET ORAL at 18:00

## 2017-12-24 RX ADMIN — CEFAZOLIN SODIUM 2 G: 2 INJECTION, SOLUTION INTRAVENOUS at 12:21

## 2017-12-24 RX ADMIN — DOCUSATE SODIUM 100 MG: 100 CAPSULE ORAL at 09:17

## 2017-12-24 RX ADMIN — ANTACID TABLETS 500 MG: 500 TABLET, CHEWABLE ORAL at 09:17

## 2017-12-24 RX ADMIN — ACETAMINOPHEN 500 MG: 500 TABLET ORAL at 11:54

## 2017-12-24 ASSESSMENT — PAIN SCALES - GENERAL
PAINLEVEL_OUTOF10: 0
PAINLEVEL_OUTOF10: 0
PAINLEVEL_OUTOF10: 4
PAINLEVEL_OUTOF10: 2

## 2017-12-24 ASSESSMENT — ENCOUNTER SYMPTOMS
MYALGIAS: 1
NECK PAIN: 1
CONSTITUTIONAL NEGATIVE: 1
EYES NEGATIVE: 1
PSYCHIATRIC NEGATIVE: 1
CARDIOVASCULAR NEGATIVE: 1
NEUROLOGICAL NEGATIVE: 1

## 2017-12-24 NOTE — DISCHARGE PLANNING
Physiatry Dr. Powers consult recommending pt appropriate for IRF level care. [facility] will follow for medical clearance/pt choice for post acute care. Voice message update to weekend d/c planner ext [phone].

## 2017-12-24 NOTE — CARE PLAN
Problem: Safety  Goal: Will remain free from injury  Call light within reach, pt calls for assistance at all times. Bed in low position and locked, upper bedside rails up, proper mobility signs placed, personal possessions within reach, treaded socks on. Hourly rounding in place.        Problem: Pain Management  Goal: Pain level will decrease to patient's comfort goal  Pt has scheduled tylenol for pain

## 2017-12-24 NOTE — PROGRESS NOTES
hemovac drain removed, posterior neck incision with sutures, new drsg applied, Aspen collar in place. Pt ambulated to the bathroom this am and to the door and back, pt very unsteady and has bilat lower and upper extremity weakness. Will ambulate again this pm.

## 2017-12-24 NOTE — PROGRESS NOTES
Bedside report received by FELIX Villa and care assumed for patient at 1900. Pt A&O X 4,  On 1L oxygen via NC. VSS.  Voiding via urinal and up to bathroom. Pt complains of numbness and tingling in hand and feet bilaterally, pt states that this is improving. SCDs to BLE.   Pt calls appropriately.  Pt sitting up in chair. Plan of care discussed including pain management, calling for assistance, mobilization. Hourly rounding in place.  Call light and belongings at bedside and within reach.  Bed in lowest position.

## 2017-12-24 NOTE — CARE PLAN
Problem: Safety  Goal: Will remain free from injury  Outcome: PROGRESSING AS EXPECTED  Call light within reach, pt calls for assistance at all times.  Bed in low position and locked, upper bedside rails up, proper mobility signs placed, personal possessions within reach, treaded socks on. Hourly rounding in place.        Problem: Pain Management  Goal: Pain level will decrease to patient's comfort goal  Outcome: PROGRESSING AS EXPECTED  Pain medication given PRN per MAR for adequate pain management.

## 2017-12-24 NOTE — PROGRESS NOTES
Assume care for patient at 0700. Pt AA/O X4. LS clear. On 1L NC.  VSS. HRR. BS+ LBM 12/21 per pt. Flatus+ Denies N/V. Voids via urinal with clear yellow urine. CMS+ ASENCIO. WBAT to BLE. C/o N/T to BUE. SCDS to BLE.  Posterior neck dressing cdi, HV in place and aspen collar in place. PIV to rfa patent and infusing. denies need for any narcotic aside from tylenol scheduled. Discussed hourly rounding and POC, pt agreeable. Refused bed alarm, pt educated re: fall risk and need of bed alarm, Pt educated and verbalized understanding of the education, pt call for assistance at all times. Pt reoriented to skylight and call light at bedside and within reach.

## 2017-12-24 NOTE — PROGRESS NOTES
Neurosurgery Progress Note    Subjective:  Only OOB yesterday x 1   Voiding, passing flatus, no BM   Pain well controlled on oral medications  Denies new pain, numbness, weakness.   Denies HA, positional HA, N/V, dizziness, chest pain, SOB, difficulty breathing, chills  Improved use of right hand dexterity     Exam:  VSS  A&Ox4, NAD  No hoarseness of voice.   No nuchal rigidity   + hoffmans right  NM: 5/5 deltoid, biceps, triceps, handgrip, intrinsics except LUE deltoid 4+, handgrip bilaterally 4 to 4+   Sensation intact all four extremities, decreased to LT, better in comparison with preop   Demonstrates improved dexterity - able to feed himself   Abdomen: soft, non-tender  Pulmonary: non-labored breathing on room air, normal respiratory effort  No LE edema, erythema, cyanosis, clubbing  Calves non-tender to compression bilat  Incision CDI, no halo sign   C-collar being worn appropriately  Drain intact - 358hrs      BP  Min: 129/87  Max: 152/97  Pulse  Av.3  Min: 68  Max: 82  Resp  Av.3  Min: 16  Max: 17  Temp  Av.4 °C (97.5 °F)  Min: 36.1 °C (97 °F)  Max: 36.7 °C (98.1 °F)  SpO2  Av.5 %  Min: 90 %  Max: 95 %    No Data Recorded    Recent Labs      17   WBC  7.8   RBC  4.92   HEMOGLOBIN  16.5   HEMATOCRIT  47.5   MCV  96.5   MCH  33.5*   MCHC  34.7   RDW  46.3   PLATELETCT  230   MPV  9.3     Recent Labs      17   SODIUM  142   POTASSIUM  3.7   CHLORIDE  109   CO2  26   GLUCOSE  154*   BUN  16     Recent Labs      17   0359   APTT  28.1  28.4   INR  0.98  1.05           Intake/Output       17 - 17 0659 17 - 17 0659      1649-5428 7910-7231 Total 3526-0549 3597-9201 Total       Intake    Total Intake -- -- -- -- -- --       Output    Urine  --  1100 1100  --  -- --    Number of Times Voided 2 x 2 x 4 x -- -- --    Void (ml) -- 1100 1100 -- -- --    Drains  180  35 215  --  -- --    Hemovac 1 180 35 215 -- -- --     Total Output 180 1135 1315 -- -- --       Net I/O     -180 -1135 -1315 -- -- --            Intake/Output Summary (Last 24 hours) at 12/24/17 1027  Last data filed at 12/24/17 0500   Gross per 24 hour   Intake                0 ml   Output             1195 ml   Net            -1195 ml            • Pharmacy Consult Request  1 Each PRN   • MD ALERT...Do not administer NSAIDS or ASPIRIN unless ORDERED By Neurosurgery  1 Each PRN   • docusate sodium  100 mg BID   • senna-docusate  1 Tab Nightly   • senna-docusate  1 Tab Q24HRS PRN   • polyethylene glycol/lytes  1 Packet BID PRN   • magnesium hydroxide  30 mL QDAY PRN   • bisacodyl  10 mg Q24HRS PRN   • fleet  1 Each Once PRN   • 0.9 % NaCl with KCl 20 mEq 1,000 mL   Continuous   • acetaminophen  500 mg Q6HRS   • ceFAZolin  2 g Q8HR   • diphenhydrAMINE  25 mg Q6HRS PRN    Or   • diphenhydrAMINE  25 mg Q6HRS PRN   • ondansetron  4 mg Q4HRS PRN   • ondansetron  4 mg Q4HRS PRN   • cyclobenzaprine  10 mg Q8HRS PRN   • temazepam  15 mg HS PRN - MR X 1   • clonidine  0.1 mg Q4HRS PRN   • benzocaine-menthol  1 Lozenge Q2HRS PRN   • calcium carbonate  500 mg BID   • vitamin D  5,000 Units DAILY   • hydrocodone/acetaminophen  1-2 Tab Q4HRS PRN   • oxycodone-acetaminophen  1-2 Tab Q6HRS PRN   • morphine injection  2 mg Q4HRS PRN   • fluticasone  2 Spray DAILY   • finasteride  5 mg DAILY   • bisoprolol  5 mg DAILY   • NS   Continuous   • acetaminophen  650 mg Q6HRS PRN   • Pharmacy Consult Request   PRN    And   • oxycodone immediate-release  2.5 mg Q3HRS PRN    And   • oxycodone immediate-release  5 mg Q3HRS PRN    And   • morphine injection  2 mg Q3HRS PRN       Assessment and Plan:  Hospital day #4  POD #3 C3-5 LAMI/C3-4 INSTRUMENTAION/FUSION  Prophylactic anticoagulation: no         Start date/time: 24 hours after drain dc'ed.    Patient looks good.  Increase ambulation to TID with nursing    Good rehab candidate ok to discharge to rehab from neurosurgery standpoint, would  appreciate medical clearance per rehab request   Continue pain control, mobilization  D/c drain  Ambulate, work with therapies  Continue incentive spirometry Q1H  Continue stool softeners to encourage BM    Appreciate hospitalist help in care and management of this patient     Patient agrees with treatment plan.   Case discussed with Dr. Forbes.

## 2017-12-24 NOTE — DISCHARGE PLANNING
Medical Social Work    ERIN received vm from Charlene with [facility] that pt was appropriate for Rehab and that they will continue to follow pt pending medical clearance. Charlene will need choice form. ERIN to obtain choice from pt.

## 2017-12-24 NOTE — PROGRESS NOTES
"[facility]ist Progress Note    Date of Service: 2017    Chief Complaint  77 y.o. male admitted 2017 with neck pain and neck numbness    Interval Problem Update  Pod #2neck surgery    See OP note    \"i am so weak\"    Neck pain, intensity 5/10, spasm, sharp.    afebrile    Consultants/Specialty  Dr garcia    Disposition  pending        Review of Systems   Constitutional: Negative.    HENT: Negative.    Eyes: Negative.    Cardiovascular: Negative.    Musculoskeletal: Positive for myalgias and neck pain.   Skin: Negative.    Neurological: Negative.  Speech change:    Psychiatric/Behavioral: Negative.       Physical Exam  Laboratory/Imaging   Hemodynamics  Temp (24hrs), Av.5 °C (97.7 °F), Min:36.1 °C (97 °F), Max:36.8 °C (98.3 °F)   Temperature: 36.8 °C (98.3 °F)  Pulse  Av.3  Min: 57  Max: 82    Blood Pressure : 130/72      Respiratory      Respiration: 16, Pulse Oximetry: 94 %        RUL Breath Sounds: Clear, RML Breath Sounds: Clear, RLL Breath Sounds: Diminished, LATRICIA Breath Sounds: Clear, LLL Breath Sounds: Diminished    Fluids    Intake/Output Summary (Last 24 hours) at 17 1246  Last data filed at 17 0500   Gross per 24 hour   Intake                0 ml   Output             1195 ml   Net            -1195 ml       Nutrition  Orders Placed This Encounter   Procedures   • Diet Order     Standing Status:   Standing     Number of Occurrences:   1     Order Specific Question:   Diet:     Answer:   Regular [1]     Physical Exam   Constitutional: He is oriented to person, place, and time. No distress.   HENT:   Neck collar in place   Eyes: Left eye exhibits no discharge.   Neck: No JVD present. No tracheal deviation present. No thyromegaly present.   Cardiovascular: Normal rate.  Exam reveals no gallop and no friction rub.    No murmur heard.  Pulmonary/Chest: Breath sounds normal. No respiratory distress. He has no wheezes. He has no rales.   Abdominal: Soft. Bowel sounds are normal. He " exhibits no distension. There is no tenderness. There is no guarding.   Musculoskeletal: He exhibits no edema or deformity.   Neurological: He is alert and oriented to person, place, and time. No cranial nerve deficit. Coordination normal.   Skin: He is not diaphoretic.       Recent Labs      12/21/17 1938   WBC  7.8   RBC  4.92   HEMOGLOBIN  16.5   HEMATOCRIT  47.5   MCV  96.5   MCH  33.5*   MCHC  34.7   RDW  46.3   PLATELETCT  230   MPV  9.3     Recent Labs      12/21/17 1938   SODIUM  142   POTASSIUM  3.7   CHLORIDE  109   CO2  26   GLUCOSE  154*   BUN  16   CREATININE  0.70   CALCIUM  9.7     Recent Labs      12/21/17 1938 12/22/17   0359   APTT  28.1  28.4   INR  0.98  1.05                  Assessment/Plan     * Cervical cord myelomalacia (CMS-HCC)- (present on admission)   Assessment & Plan    Pod # 2  cervical spinal fusion and decompression today          Preoperative evaluation to rule out surgical contraindication- (present on admission)   Assessment & Plan    - Does have a 20-pack-year smoking history  - Labs today are unremarkable  - No significant cardiac history other than hypertension and hyperlipidemia  - Patient is low to moderate risk for moderate risk surgery        Smoking history- (present on admission)   Assessment & Plan    - 20-pack-year smoking history  - No longer smokes        refer to rehab  Quality-Core Measures

## 2017-12-25 ENCOUNTER — HOSPITAL ENCOUNTER (INPATIENT)
Facility: REHABILITATION | Age: 77
LOS: 14 days | DRG: 560 | End: 2018-01-08
Attending: PHYSICAL MEDICINE & REHABILITATION | Admitting: PHYSICAL MEDICINE & REHABILITATION
Payer: MEDICARE

## 2017-12-25 ENCOUNTER — RESOLUTE PROFESSIONAL BILLING HOSPITAL PROF FEE (OUTPATIENT)
Dept: PHYSICAL MEDICINE AND REHAB | Facility: REHABILITATION | Age: 77
End: 2017-12-25
Payer: MEDICARE

## 2017-12-25 VITALS
TEMPERATURE: 98.2 F | OXYGEN SATURATION: 91 % | SYSTOLIC BLOOD PRESSURE: 123 MMHG | WEIGHT: 166.23 LBS | RESPIRATION RATE: 17 BRPM | DIASTOLIC BLOOD PRESSURE: 72 MMHG | HEART RATE: 83 BPM | BODY MASS INDEX: 23.85 KG/M2

## 2017-12-25 DIAGNOSIS — J30.2 SEASONAL ALLERGIES: ICD-10-CM

## 2017-12-25 PROBLEM — Z98.1 S/P CERVICAL SPINAL FUSION: Status: ACTIVE | Noted: 2017-12-25

## 2017-12-25 PROBLEM — M48.02 CERVICAL STENOSIS OF SPINE: Status: ACTIVE | Noted: 2017-12-25

## 2017-12-25 PROBLEM — Z01.818 PREOPERATIVE EVALUATION TO RULE OUT SURGICAL CONTRAINDICATION: Status: RESOLVED | Noted: 2017-12-21 | Resolved: 2017-12-25

## 2017-12-25 PROBLEM — I10 HYPERTENSION: Status: ACTIVE | Noted: 2017-12-25

## 2017-12-25 PROCEDURE — 87086 URINE CULTURE/COLONY COUNT: CPT

## 2017-12-25 PROCEDURE — 94760 N-INVAS EAR/PLS OXIMETRY 1: CPT

## 2017-12-25 PROCEDURE — 770010 HCHG ROOM/CARE - REHAB SEMI PRIVAT*

## 2017-12-25 PROCEDURE — 99223 1ST HOSP IP/OBS HIGH 75: CPT | Mod: AI | Performed by: PHYSICAL MEDICINE & REHABILITATION

## 2017-12-25 PROCEDURE — 700111 HCHG RX REV CODE 636 W/ 250 OVERRIDE (IP): Performed by: HOSPITALIST

## 2017-12-25 PROCEDURE — 700111 HCHG RX REV CODE 636 W/ 250 OVERRIDE (IP): Performed by: PHYSICAL MEDICINE & REHABILITATION

## 2017-12-25 PROCEDURE — A9270 NON-COVERED ITEM OR SERVICE: HCPCS | Performed by: PHYSICIAN ASSISTANT

## 2017-12-25 PROCEDURE — 700112 HCHG RX REV CODE 229: Performed by: PHYSICIAN ASSISTANT

## 2017-12-25 PROCEDURE — 90662 IIV NO PRSV INCREASED AG IM: CPT | Performed by: HOSPITALIST

## 2017-12-25 PROCEDURE — A9270 NON-COVERED ITEM OR SERVICE: HCPCS | Performed by: HOSPITALIST

## 2017-12-25 PROCEDURE — 90471 IMMUNIZATION ADMIN: CPT

## 2017-12-25 PROCEDURE — A9270 NON-COVERED ITEM OR SERVICE: HCPCS | Performed by: PHYSICAL MEDICINE & REHABILITATION

## 2017-12-25 PROCEDURE — 700102 HCHG RX REV CODE 250 W/ 637 OVERRIDE(OP): Performed by: HOSPITALIST

## 2017-12-25 PROCEDURE — 3E0234Z INTRODUCTION OF SERUM, TOXOID AND VACCINE INTO MUSCLE, PERCUTANEOUS APPROACH: ICD-10-PCS | Performed by: HOSPITALIST

## 2017-12-25 PROCEDURE — 81001 URINALYSIS AUTO W/SCOPE: CPT

## 2017-12-25 PROCEDURE — 700111 HCHG RX REV CODE 636 W/ 250 OVERRIDE (IP): Performed by: PHYSICIAN ASSISTANT

## 2017-12-25 PROCEDURE — 99239 HOSP IP/OBS DSCHRG MGMT >30: CPT | Performed by: HOSPITALIST

## 2017-12-25 PROCEDURE — 700102 HCHG RX REV CODE 250 W/ 637 OVERRIDE(OP): Performed by: PHYSICAL MEDICINE & REHABILITATION

## 2017-12-25 PROCEDURE — 700102 HCHG RX REV CODE 250 W/ 637 OVERRIDE(OP): Performed by: PHYSICIAN ASSISTANT

## 2017-12-25 RX ORDER — CYCLOBENZAPRINE HCL 10 MG
10 TABLET ORAL EVERY 8 HOURS PRN
Status: CANCELLED | OUTPATIENT
Start: 2017-12-25

## 2017-12-25 RX ORDER — CALCIUM CARBONATE 500 MG/1
500 TABLET, CHEWABLE ORAL 2 TIMES DAILY
Qty: 30 TAB | Status: ON HOLD
Start: 2017-12-25 | End: 2019-07-22

## 2017-12-25 RX ORDER — ECHINACEA PURPUREA EXTRACT 125 MG
2 TABLET ORAL PRN
Status: DISCONTINUED | OUTPATIENT
Start: 2017-12-25 | End: 2018-01-08 | Stop reason: HOSPADM

## 2017-12-25 RX ORDER — CALCIUM CARBONATE 500 MG/1
500 TABLET, CHEWABLE ORAL 2 TIMES DAILY
Status: CANCELLED | OUTPATIENT
Start: 2017-12-25

## 2017-12-25 RX ORDER — TEMAZEPAM 15 MG/1
15 CAPSULE ORAL
Qty: 30 CAP | Refills: 0 | Status: ON HOLD
Start: 2017-12-25 | End: 2018-01-08

## 2017-12-25 RX ORDER — ONDANSETRON 4 MG/1
4 TABLET, ORALLY DISINTEGRATING ORAL EVERY 4 HOURS PRN
Qty: 10 TAB | Refills: 0 | Status: ON HOLD
Start: 2017-12-25 | End: 2018-01-08

## 2017-12-25 RX ORDER — AMOXICILLIN 250 MG
1 CAPSULE ORAL NIGHTLY
Status: CANCELLED | OUTPATIENT
Start: 2017-12-25

## 2017-12-25 RX ORDER — DOCUSATE SODIUM 100 MG/1
100 CAPSULE, LIQUID FILLED ORAL 2 TIMES DAILY
Status: DISCONTINUED | OUTPATIENT
Start: 2017-12-25 | End: 2018-01-08 | Stop reason: HOSPADM

## 2017-12-25 RX ORDER — ACETAMINOPHEN 500 MG
500 TABLET ORAL EVERY 6 HOURS
Status: CANCELLED | OUTPATIENT
Start: 2017-12-25 | End: 2017-12-27

## 2017-12-25 RX ORDER — POLYVINYL ALCOHOL 14 MG/ML
1 SOLUTION/ DROPS OPHTHALMIC PRN
Status: DISCONTINUED | OUTPATIENT
Start: 2017-12-25 | End: 2018-01-08 | Stop reason: HOSPADM

## 2017-12-25 RX ORDER — CEPHALEXIN 500 MG/1
500 CAPSULE ORAL EVERY 6 HOURS
Status: CANCELLED | OUTPATIENT
Start: 2017-12-25 | End: 2018-01-01

## 2017-12-25 RX ORDER — FINASTERIDE 5 MG/1
5 TABLET, FILM COATED ORAL DAILY
Status: DISCONTINUED | OUTPATIENT
Start: 2017-12-26 | End: 2018-01-02

## 2017-12-25 RX ORDER — BISOPROLOL FUMARATE 5 MG/1
5 TABLET, FILM COATED ORAL DAILY
Status: DISCONTINUED | OUTPATIENT
Start: 2017-12-26 | End: 2018-01-08 | Stop reason: HOSPADM

## 2017-12-25 RX ORDER — CEPHALEXIN 250 MG/1
500 CAPSULE ORAL EVERY 6 HOURS
Status: COMPLETED | OUTPATIENT
Start: 2017-12-25 | End: 2018-01-01

## 2017-12-25 RX ORDER — FLUTICASONE PROPIONATE 50 MCG
2 SPRAY, SUSPENSION (ML) NASAL DAILY
Status: CANCELLED | OUTPATIENT
Start: 2017-12-26

## 2017-12-25 RX ORDER — CALCIUM CARBONATE 500 MG/1
500 TABLET, CHEWABLE ORAL 2 TIMES DAILY
Status: DISCONTINUED | OUTPATIENT
Start: 2017-12-25 | End: 2018-01-08 | Stop reason: HOSPADM

## 2017-12-25 RX ORDER — BISOPROLOL FUMARATE 5 MG/1
5 TABLET, FILM COATED ORAL DAILY
Status: CANCELLED | OUTPATIENT
Start: 2017-12-26

## 2017-12-25 RX ORDER — HYDRALAZINE HYDROCHLORIDE 25 MG/1
25 TABLET, FILM COATED ORAL EVERY 8 HOURS PRN
Status: DISCONTINUED | OUTPATIENT
Start: 2017-12-25 | End: 2018-01-08 | Stop reason: HOSPADM

## 2017-12-25 RX ORDER — OXYCODONE HYDROCHLORIDE 5 MG/1
5 TABLET ORAL
Status: DISCONTINUED | OUTPATIENT
Start: 2017-12-25 | End: 2018-01-07

## 2017-12-25 RX ORDER — PSEUDOEPHEDRINE HCL 30 MG
100 TABLET ORAL 2 TIMES DAILY
Qty: 60 CAP | Status: ON HOLD
Start: 2017-12-25 | End: 2018-01-08

## 2017-12-25 RX ORDER — CEPHALEXIN 500 MG/1
500 CAPSULE ORAL EVERY 6 HOURS
Refills: 0 | Status: ON HOLD
Start: 2017-12-25 | End: 2018-01-08

## 2017-12-25 RX ORDER — CYCLOBENZAPRINE HCL 10 MG
10 TABLET ORAL EVERY 8 HOURS PRN
Status: DISCONTINUED | OUTPATIENT
Start: 2017-12-25 | End: 2018-01-07

## 2017-12-25 RX ORDER — ALUMINA, MAGNESIA, AND SIMETHICONE 2400; 2400; 240 MG/30ML; MG/30ML; MG/30ML
20 SUSPENSION ORAL
Status: DISCONTINUED | OUTPATIENT
Start: 2017-12-25 | End: 2018-01-08 | Stop reason: HOSPADM

## 2017-12-25 RX ORDER — CEPHALEXIN 500 MG/1
500 CAPSULE ORAL EVERY 6 HOURS
Status: DISCONTINUED | OUTPATIENT
Start: 2017-12-25 | End: 2017-12-25 | Stop reason: HOSPADM

## 2017-12-25 RX ORDER — HYDROCODONE BITARTRATE AND ACETAMINOPHEN 10; 325 MG/1; MG/1
1-2 TABLET ORAL EVERY 4 HOURS PRN
Qty: 20 TAB | Refills: 0 | Status: ON HOLD
Start: 2017-12-25 | End: 2018-01-08

## 2017-12-25 RX ORDER — TRAZODONE HYDROCHLORIDE 50 MG/1
50 TABLET ORAL
Status: DISCONTINUED | OUTPATIENT
Start: 2017-12-25 | End: 2018-01-08 | Stop reason: HOSPADM

## 2017-12-25 RX ORDER — FINASTERIDE 5 MG/1
5 TABLET, FILM COATED ORAL DAILY
Status: CANCELLED | OUTPATIENT
Start: 2017-12-26

## 2017-12-25 RX ORDER — ONDANSETRON 4 MG/1
4 TABLET, ORALLY DISINTEGRATING ORAL EVERY 4 HOURS PRN
Status: CANCELLED | OUTPATIENT
Start: 2017-12-25

## 2017-12-25 RX ORDER — DOCUSATE SODIUM 100 MG/1
100 CAPSULE, LIQUID FILLED ORAL 2 TIMES DAILY
Status: CANCELLED | OUTPATIENT
Start: 2017-12-25

## 2017-12-25 RX ORDER — ACETAMINOPHEN 500 MG
500 TABLET ORAL EVERY 6 HOURS
Status: COMPLETED | OUTPATIENT
Start: 2017-12-25 | End: 2017-12-27

## 2017-12-25 RX ORDER — POLYETHYLENE GLYCOL 3350 17 G/17G
1 POWDER, FOR SOLUTION ORAL 2 TIMES DAILY PRN
Status: DISCONTINUED | OUTPATIENT
Start: 2017-12-25 | End: 2018-01-08 | Stop reason: HOSPADM

## 2017-12-25 RX ORDER — AMOXICILLIN 250 MG
1 CAPSULE ORAL NIGHTLY
Status: DISCONTINUED | OUTPATIENT
Start: 2017-12-25 | End: 2018-01-08 | Stop reason: HOSPADM

## 2017-12-25 RX ORDER — FLUTICASONE PROPIONATE 50 MCG
2 SPRAY, SUSPENSION (ML) NASAL DAILY
Status: DISCONTINUED | OUTPATIENT
Start: 2017-12-26 | End: 2018-01-08 | Stop reason: HOSPADM

## 2017-12-25 RX ORDER — POLYETHYLENE GLYCOL 3350 17 G/17G
1 POWDER, FOR SOLUTION ORAL 2 TIMES DAILY PRN
Status: CANCELLED | OUTPATIENT
Start: 2017-12-25

## 2017-12-25 RX ORDER — TRAMADOL HYDROCHLORIDE 50 MG/1
50 TABLET ORAL EVERY 4 HOURS PRN
Status: DISCONTINUED | OUTPATIENT
Start: 2017-12-25 | End: 2018-01-07

## 2017-12-25 RX ORDER — ONDANSETRON 4 MG/1
4 TABLET, ORALLY DISINTEGRATING ORAL EVERY 4 HOURS PRN
Status: DISCONTINUED | OUTPATIENT
Start: 2017-12-25 | End: 2018-01-08 | Stop reason: HOSPADM

## 2017-12-25 RX ORDER — CYCLOBENZAPRINE HCL 10 MG
10 TABLET ORAL EVERY 8 HOURS PRN
Qty: 30 TAB | Refills: 0 | Status: ON HOLD
Start: 2017-12-25 | End: 2018-01-08

## 2017-12-25 RX ADMIN — OXYCODONE HYDROCHLORIDE 5 MG: 5 TABLET ORAL at 05:57

## 2017-12-25 RX ADMIN — ENOXAPARIN SODIUM 30 MG: 100 INJECTION SUBCUTANEOUS at 10:06

## 2017-12-25 RX ADMIN — CALCIUM CARBONATE (ANTACID) CHEW TAB 500 MG 500 MG: 500 CHEW TAB at 20:15

## 2017-12-25 RX ADMIN — CEPHALEXIN 500 MG: 500 CAPSULE ORAL at 11:37

## 2017-12-25 RX ADMIN — INFLUENZA A VIRUSA/MICHIGAN/45/2015 X-275 (H1N1) ANTIGEN (FORMALDEHYDE INACTIVATED), INFLUENZA A VIRUS A/HONG KONG/4801/2014 X-263B (H3N2) ANTIGEN (FORMALDEHYDE INACTIVATED), AND INFLUENZA B VIRUS B/BRISBANE/60/2008 ANTIGEN (FORMALDEHYDE INACTIVATED) 0.5 ML: 60; 60; 60 INJECTION, SUSPENSION INTRAMUSCULAR at 11:48

## 2017-12-25 RX ADMIN — OXYCODONE HYDROCHLORIDE 5 MG: 5 TABLET ORAL at 02:43

## 2017-12-25 RX ADMIN — ACETAMINOPHEN 500 MG: 500 TABLET ORAL at 05:57

## 2017-12-25 RX ADMIN — ACETAMINOPHEN 500 MG: 500 TABLET ORAL at 11:37

## 2017-12-25 RX ADMIN — FLUTICASONE PROPIONATE 100 MCG: 50 SPRAY, METERED NASAL at 08:27

## 2017-12-25 RX ADMIN — ENOXAPARIN SODIUM 30 MG: 100 INJECTION SUBCUTANEOUS at 20:15

## 2017-12-25 RX ADMIN — BISOPROLOL FUMARATE 5 MG: 5 TABLET, COATED ORAL at 08:25

## 2017-12-25 RX ADMIN — ACETAMINOPHEN 500 MG: 500 TABLET ORAL at 18:04

## 2017-12-25 RX ADMIN — DOCUSATE SODIUM 100 MG: 100 CAPSULE ORAL at 08:25

## 2017-12-25 RX ADMIN — ACETAMINOPHEN 500 MG: 500 TABLET ORAL at 23:22

## 2017-12-25 RX ADMIN — ANTACID TABLETS 500 MG: 500 TABLET, CHEWABLE ORAL at 08:24

## 2017-12-25 RX ADMIN — FINASTERIDE 5 MG: 5 TABLET, FILM COATED ORAL at 08:25

## 2017-12-25 RX ADMIN — CEPHALEXIN 500 MG: 250 CAPSULE ORAL at 18:04

## 2017-12-25 RX ADMIN — CEPHALEXIN 500 MG: 250 CAPSULE ORAL at 23:22

## 2017-12-25 RX ADMIN — CHOLECALCIFEROL TAB 25 MCG (1000 UNIT) 5000 UNITS: 25 TAB at 08:25

## 2017-12-25 ASSESSMENT — ENCOUNTER SYMPTOMS
NECK PAIN: 1
CONSTITUTIONAL NEGATIVE: 1
EYES NEGATIVE: 1
MYALGIAS: 1
CARDIOVASCULAR NEGATIVE: 1
PSYCHIATRIC NEGATIVE: 1
NEUROLOGICAL NEGATIVE: 1

## 2017-12-25 ASSESSMENT — LIFESTYLE VARIABLES
HAVE PEOPLE ANNOYED YOU BY CRITICIZING YOUR DRINKING: YES
HAVE YOU EVER FELT YOU SHOULD CUT DOWN ON YOUR DRINKING: YES
ALCOHOL_USE: YES
EVER FELT BAD OR GUILTY ABOUT YOUR DRINKING: YES
TOTAL SCORE: 3
ALCOHOL_USE: NO
EVER_SMOKED: YES
ALCOHOL_AMOUNT: 4
EVER_SMOKED: YES
DOES PATIENT WANT TO STOP DRINKING: NO
EVER HAD A DRINK FIRST THING IN THE MORNING TO STEADY YOUR NERVES TO GET RID OF A HANGOVER: NO
TOTAL SCORE: 3
CONSUMPTION TOTAL: INCOMPLETE
TOTAL SCORE: 3

## 2017-12-25 ASSESSMENT — PATIENT HEALTH QUESTIONNAIRE - PHQ9
SUM OF ALL RESPONSES TO PHQ9 QUESTIONS 1 AND 2: 0
1. LITTLE INTEREST OR PLEASURE IN DOING THINGS: NOT AT ALL
2. FEELING DOWN, DEPRESSED, IRRITABLE, OR HOPELESS: NOT AT ALL
SUM OF ALL RESPONSES TO PHQ9 QUESTIONS 1 AND 2: 0
SUM OF ALL RESPONSES TO PHQ QUESTIONS 1-9: 0
2. FEELING DOWN, DEPRESSED, IRRITABLE, OR HOPELESS: NOT AT ALL
1. LITTLE INTEREST OR PLEASURE IN DOING THINGS: NOT AT ALL
SUM OF ALL RESPONSES TO PHQ QUESTIONS 1-9: 0

## 2017-12-25 ASSESSMENT — PAIN SCALES - GENERAL
PAINLEVEL_OUTOF10: 5
PAINLEVEL_OUTOF10: ASSUMED PAIN PRESENT
PAINLEVEL_OUTOF10: 7
PAINLEVEL_OUTOF10: 10

## 2017-12-25 NOTE — CARE PLAN
Problem: Safety  Goal: Will remain free from injury  Outcome: PROGRESSING AS EXPECTED  Call light within reach, pt calls for assistance at all times.  Bed in low position and locked, upper bedside rails up, proper mobility signs placed, personal possessions within reach, treaded socks on. Hourly rounding in place.          Problem: Pain Management  Goal: Pain level will decrease to patient's comfort goal  Outcome: PROGRESSING AS EXPECTED  Pt complaining of no pain at this time.     Problem: Mobility  Goal: Risk for activity intolerance will decrease  Outcome: PROGRESSING AS EXPECTED  Pt able to ambulate to the bathroom with one person assist and a front wheel walker, pt mostly steady with shuffle at times.

## 2017-12-25 NOTE — DISCHARGE PLANNING
Rehab following. Voice message to d/c planner  [phone] requesting update on medical clearance for acute rehab today. Rehab pre-admit screen forwarded to Dr. Edwards for review. Will monitor for d/c summary as medically appropriate.

## 2017-12-25 NOTE — DISCHARGE INSTRUCTIONS
Discharge Instructions    Discharged to other by medical transportation with escort. Discharged via wheelchair, hospital escort: Yes.  Special equipment needed: C-Collar    Be sure to schedule a follow-up appointment with your primary care doctor or any specialists as instructed.     Discharge Plan:   Diet Plan: Discussed  Activity Level: Discussed  Confirmed Follow up Appointment: Patient to Call and Schedule Appointment  Confirmed Symptoms Management: Discussed  Medication Reconciliation Updated: Yes  Pneumococcal Vaccine Given - only chart on this line when given: Given (See MAR)  Influenza Vaccine Indication: Indicated: 65 years and older    I understand that a diet low in cholesterol, fat, and sodium is recommended for good health. Unless I have been given specific instructions below for another diet, I accept this instruction as my diet prescription.   Other diet: Regular    Special Instructions: Discharge instructions for the Orthopedic Patient    Follow up with Primary Care Physician within 2 weeks of discharge to home, regarding:  Review of medications and diagnostic testing.  Surveillance for medical complications.  Workup and treatment of osteoporosis, if appropriate.     -Is this a Joint Replacement patient? No    -Is this patient being discharged with medication to prevent blood clots?  Yes, Lovenox Enoxaparin injection  What is this medicine?  ENOXAPARIN (ee nox a PA rin) is used after knee, hip, or abdominal surgeries to prevent blood clotting. It is also used to treat existing blood clots in the lungs or in the veins.  This medicine may be used for other purposes; ask your health care provider or pharmacist if you have questions.  COMMON BRAND NAME(S): Lovenox  What should I tell my health care provider before I take this medicine?  They need to know if you have any of these conditions:  -bleeding disorders, hemorrhage, or hemophilia  -infection of the heart or heart valves  -kidney or liver  disease  -previous stroke  -prosthetic heart valve  -recent surgery or delivery of a baby  -ulcer in the stomach or intestine, diverticulitis, or other bowel disease  -an unusual or allergic reaction to enoxaparin, heparin, pork or pork products, other medicines, foods, dyes, or preservatives  -pregnant or trying to get pregnant  -breast-feeding  How should I use this medicine?  This medicine is for injection under the skin. It is usually given by a health-care professional. You or a family member may be trained on how to give the injections. If you are to give yourself injections, make sure you understand how to use the syringe, measure the dose if necessary, and give the injection. To avoid bruising, do not rub the site where this medicine has been injected. Do not take your medicine more often than directed. Do not stop taking except on the advice of your doctor or health care professional.  Make sure you receive a puncture-resistant container to dispose of the needles and syringes once you have finished with them. Do not reuse these items. Return the container to your doctor or health care professional for proper disposal.  Talk to your pediatrician regarding the use of this medicine in children. Special care may be needed.  Overdosage: If you think you have taken too much of this medicine contact a poison control center or emergency room at once.  NOTE: This medicine is only for you. Do not share this medicine with others.  What if I miss a dose?  If you miss a dose, take it as soon as you can. If it is almost time for your next dose, take only that dose. Do not take double or extra doses.  What may interact with this medicine?  Do not take this medicine with any of the following medications:  -aspirin and aspirin-like medicines  -heparin  -mifepristone  -palifermin  -warfarin   This medicine may also interact with the following medications:  -cilostazol  -clopidogrel  -dipyridamole  -NSAIDs, medicines for pain  and inflammation, like ibuprofen or naproxen  -sulfinpyrazone  -ticlopidine  This list may not describe all possible interactions. Give your health care provider a list of all the medicines, herbs, non-prescription drugs, or dietary supplements you use. Also tell them if you smoke, drink alcohol, or use illegal drugs. Some items may interact with your medicine.  What should I watch for while using this medicine?  Visit your doctor or health care professional for regular checks on your progress. Your condition will be monitored carefully while you are receiving this medicine.  If you are going to have surgery, tell your doctor or health care professional that you are taking this medicine.  Do not stop taking this medicine without first talking to your doctor. Be sure to refill your prescription before you run out of medicine.  Avoid sports and activities that might cause injury while you are using this medicine. Severe falls or injuries can cause unseen bleeding. Be careful when using sharp tools or knives. Consider using an electric razor. Take special care brushing or flossing your teeth. Report any injuries, bruising, or red spots on the skin to your doctor or health care professional.  What side effects may I notice from receiving this medicine?  Side effects that you should report to your doctor or health care professional as soon as possible:  -allergic reactions like skin rash, itching or hives, swelling of the face, lips, or tongue  -dark urine  -feeling faint or lightheaded, falls  -fever  -heavy menstrual bleeding  -signs and symptoms of bleeding such as bloody or black, tarry stools; red or dark-brown urine; spitting up blood or brown material that looks like coffee grounds; red spots on the skin; unusual bruising or bleeding from the eye, gums, or nose  -signs and symptoms of a blood clot such as breathing problems; changes in vision; chest pain; severe, sudden headache; pain, swelling, warmth in the leg;  trouble speaking; sudden numbness or weakness of the face, arm or leg  Side effects that usually do not require medical attention (report to your doctor or health care professional if they continue or are bothersome):  -pain or irritation at the injection site  This list may not describe all possible side effects. Call your doctor for medical advice about side effects. You may report side effects to FDA at 3-054-XQL-8110.  Where should I keep my medicine?  Keep out of the reach of children.  Store at room temperature between 15 and 30 degrees C (59 and 86 degrees F). Do not freeze. If your injections have been specially prepared, you may need to store them in the refrigerator. Ask your pharmacist. Throw away any unused medicine after the expiration date.  NOTE: This sheet is a summary. It may not cover all possible information. If you have questions about this medicine, talk to your doctor, pharmacist, or health care provider.  © 2014, [employer]. (3/31/2014 10:04:27 AM)      · Is patient discharged on Warfarin / Coumadin?   No     · Is patient Post Blood Transfusion?  No  Cervical Collar  A cervical collar is a device that supports your chin and the back of your head. It is used after a severe neck injury to protect your head and neck. It does this by restricting the movement of the top part of your spine, which is located in your neck. A cervical collar may be used when you have:  · A fractured neck.  · Ligament damage.  · A spinal cord injury.  WHAT INSTRUCTIONS SHOULD I FOLLOW?  · Wear the collar for as long as your health care provider instructs.  · Follow your health care provider's instructions about how to put on and take off your collar.  · Do not make your collar so tight that you feel pain or it is hard for you to breathe.  · Do not remove the collar unless your health care provider says it is okay. Ask your health care provider if you can remove the collar for showering or eating or to apply  ice.  · Do not drive a car until your health care provider says it is okay.  · Keep all follow-up visits as directed by your health care provider. This is important. Any delay in getting necessary care can keep your injury from healing properly.  · Apply ice to the injured area:  ¨ Put ice in a plastic bag.  ¨ Place a towel between your skin and the bag.  ¨ Leave the ice on for 20 minutes, 2-3 times per day for the first 2 days.     This information is not intended to replace advice given to you by your health care provider. Make sure you discuss any questions you have with your health care provider.     Document Released: 09/09/2005 Document Revised: 01/08/2016 Document Reviewed: 07/27/2015  [employer] Interactive Patient Education ©2016 [employer]      Cervical Fusion  The neck is the upper portion of your spine. The 7 bones in your neck are referred to as the cervical spine. Cervical fusion is a type of surgery that is done on the cervical spine to relieve pressure on the spinal cord or one or more nerve roots. There are two types of cervical fusion:  · Anterior cervical fusion. This surgery is done through the front (anterior) part of your neck. During the surgery the affected intervertebral disk is removed to take pressure off the nerves or spinal cord. The area where the disc was removed is filled with a bone graft that causes the vertebral bodies to grow together (fuse) over time.  · Posterior cervical fusion. This surgery is done through the back (posterior) of the neck. The surgery joins two or more neck vertebrae into one solid section of bone. Posterior cervical fusion is most commonly used to treat neck fractures and dislocations and to fix deformities in the curve of the neck.  LET YOUR HEALTH CARE PROVIDER KNOW ABOUT:  · Any allergies you have.  · All medicines you are taking, including vitamins, herbs, eyedrops, creams, and over-the-counter medicines.  · Previous problems you or members of your  family have had with the use of anesthetics.  · Any blood disorders or blood clotting problems you have.  · Previous surgeries you have had.  · Medical conditions you have.  RISKS AND COMPLICATIONS  Generally, this is a safe procedure. However, as with any procedure, problems can occur. Possible problems include:   · Infection.    · Bleeding with possible need for blood transfusion.    · Injury to surrounding structures, including nerves.    · Leakage of cerebrospinal fluid.    · Blood clots.  · Temporary breathing difficulties after surgery.  · Extended hospital stay, especially with posterior cervical fusion.  BEFORE THE PROCEDURE  · Do not eat or drink for 6-8 hours before the procedure.    · Take medicines as directed by your surgeon. Ask your surgeon about changing or stopping your regular medicines.    · You will be given antibiotic medicines to keep the infection rate down.    · The surgical cut (incision) site on your neck will be marked.    · Your neck will be cleaned to reduce the risk of infection.  PROCEDURE   The length of the procedure depends on what needs to be done. It usually takes 2 or more hours. For both procedures, you will be given medicine to make you sleep (general anesthetic). A breathing tube will be placed down your throat.   Anterior Cervical Fusion   · An incision will usually be made in a skin fold line at the front of your neck, in the area where the fusion will be placed.   · The neck muscles will be pushed aside.    · The surgeon will remove the affected, degenerated disk and bone spurs (decompression). This helps to take the pressure off the nerves and spinal cord.    · The area where the disk was removed is then filled with a plastic spacer implant, bone graft, or both. These implants and bone grafts take the place of the disk and keep the nerve passageway open and clear for the nerves and spinal cord.    · In most cases, the surgeon will put metal plates, pins, or screws  (hardware) in the neck to help stabilize the surgical site and to keep the implants and bone grafts in place. The hardware reduces motion at the surgical site, so the bones can grow together. This provides extra support to the neck.    Posterior Cervical Fusion   · An incision will be made through the back of the neck.    · Two or more neck vertebrae will be joined into one solid section of bone.    · Metal plates and pins or screws may be placed in the neck. These help stabilize the neck, providing extra support to help the bones to grow together more easily.  AFTER THE PROCEDURE  · You will stay in a recovery area until the anesthesia has worn off. Your blood pressure and pulse will be checked often.    · You may continue to receive fluids and medicines, such as antibiotics, through the IV tube for several days after the surgery.    · You may need to wear a neck or back brace for several weeks after surgery, especially when up and out of bed.    · You may be given pain medicine while still in the recovery area. Some pain is normal, but if your pain gets worse, tell your surgeon or nurse.    · Be up and moving as soon as possible after surgery. Physical therapists will help you start walking.    · To prevent blood clots in your legs:  ¨ You may be given compression stockings to wear.    ¨ You may need to take medicine to prevent clots.  · You may be asked to do breathing exercises. This is to prevent a lung infection.    · Most people stay in the hospital for 1-3 days after this surgery.       This information is not intended to replace advice given to you by your health care provider. Make sure you discuss any questions you have with your health care provider.     Document Released: 06/09/2003 Document Revised: 12/23/2014 Document Reviewed: 06/19/2014  [employer] Interactive Patient Education ©2016 [employer]      Depression / Suicide Risk    As you are discharged from this [facility] facility, it is important  to learn how to keep safe from harming yourself.    Recognize the warning signs:  · Abrupt changes in personality, positive or negative- including increase in energy   · Giving away possessions  · Change in eating patterns- significant weight changes-  positive or negative  · Change in sleeping patterns- unable to sleep or sleeping all the time   · Unwillingness or inability to communicate  · Depression  · Unusual sadness, discouragement and loneliness  · Talk of wanting to die  · Neglect of personal appearance   · Rebelliousness- reckless behavior  · Withdrawal from people/activities they love  · Confusion- inability to concentrate     If you or a loved one observes any of these behaviors or has concerns about self-harm, here's what you can do:  · Talk about it- your feelings and reasons for harming yourself  · Remove any means that you might use to hurt yourself (examples: pills, rope, extension cords, firearm)  · Get professional help from the community (Mental Health, Substance Abuse, psychological counseling)  · Do not be alone:Call your Safe Contact- someone whom you trust who will be there for you.  · Call your local CRISIS HOTLINE 328-7707 or 693-373-3955  · Call your local Children's Mobile Crisis Response Team [place] (308) 303-1268 or www.[employer]  · Call the toll free National Suicide Prevention Hotlines   · National Suicide Prevention Lifeline 714-433-ERNF (3223)  · National Hope Line Network 800-SUICIDE (247-3728)

## 2017-12-25 NOTE — PROGRESS NOTES
Bedside report received by FELIX Akhtar and care assumed for patient at 1900. Pt A&O X 4,  On 1L oxygen via NC. VSS.  Voiding via urinal and up to bathroom. Pt has numbness and tingling to bilateral hands and feet, pt states that this is improving since surgery. SCDs to BLE.   Pt calls appropriately.  Pt sitting up in bed watching tv. Plan of care discussed including pain management, mobilization, utilizing the call light. Hourly rounding in place.  Call light and belongings at bedside and within reach.  Bed in lowest position.

## 2017-12-25 NOTE — DISCHARGE PLANNING
SW met with pt at bedside to discuss Cobra. Pt signed cobra. Cobra placed in chart. Bedside nurse notified.  SW left VM with pt's wife to notify her of pt's transport.     SW discussed IMM, provided copy, and placed copy in chart. Pt initialed IMM.     Pt notified that VM was left with wife.

## 2017-12-25 NOTE — PROGRESS NOTES
Gave report to RN at [facility], reviewed discharge papers with patient, patient verbalized understanding, patient escorted by CNA via wheelchair by transport, belongings with patient.

## 2017-12-25 NOTE — H&P
REHABILITATION HISTORY AND PHYSICAL/POST ADMISSION EVALUATION    12/25/2017  2:55 PM  Bartolo Garcia  [id number]  Admission  12/25/2017  2:28 PM  Reason for admission: 04.130 Other Non-Traumatic Spinal Cord Dysfunction  Chief Complaint: hand weakness and numbness    HPI:  Patient is a 77 y.o. year-old male with a past medical history significant for hypertension and hyperlipidemia admitted to [facility] on 12/21/2017 3:57 PM with symptoms of cervical myelopathy for urgent decompression and fusion by Dr. Forbes.     The patient had reportedly come to see Dr. Forbes in outpatient clinic on December 21 due to a month long history of neck pain, progressive numbness, and weakness in his bilateral hands. He also developed difficulty walking. He was noted to be dropping things and having difficulty with gait. He progressed to using a front wheeled walker for ambulation.     The images of an outside MRI of the cervical spine dated December 21, 2017 showed severe spinal stenosis at the C3-C4 level with associated focal T2 cord signal abnormality at that level.     On December 22, 2017, the patient underwent C3-C5 laminectomy with C3-C4 posterior instrumented fusion by Dr. Forbes. Intraoperatively there was notation of severe facet arthropathy at the left C3-4 facet joint and left C4-5 facet joint. There is notation of good decompression of the thecal sac which remained also towel.     Post-operatively the patient had improved left upper extremity strength, and is wearing a cervical collar at all times. He is to complete a course of Keflex for a week per internal medicine - unclear reason, presumably for UTI as patient is complaining of dysuria.    Patient was evaluated by Rehab Medicine physician and Physical Therapy and Occupational Therapy and determined to be appropriate for acute inpatient rehab and was transferred to [facility] on 12/25/2017.    With this acute therapeutic intervention, this  patient hopes to improve his functional status, and return to independent living with the supportive care of spouse.    Patient current reports improved hand weakness bilaterally, though still has numbness and tingling in his bilateral hands and feet. Has moved bowels since surgery. Had dysuria that has improved since starting antibiotics. Has some mild neck pain and reports discomfort with the cervical collar. He never had any radicular pain down his arms. Prior to the surgery he had spasms in his legs, but this has resolved.    REVIEW OF SYSTEMS:     A 12 point review of systems was performed and was negative in detail with the exception of items mentioned elsewhere in this document.    PMH:  Past Medical History:   Diagnosis Date   • BPH (benign prostatic hyperplasia)    • Hyperlipidemia    • Hypertension        PSH:  Past Surgical History:   Procedure Laterality Date   • CERVICAL FUSION POSTERIOR N/A 12/22/2017    Procedure: CERVICAL FUSION POSTERIOR- C3-6;  Surgeon: Tacos Forbes M.D.;  Location: SURGERY [facility];  Service: Neurosurgery   • OTHER ORTHOPEDIC SURGERY      right knee arthroscopy   • TONSILLECTOMY         Family History   Problem Relation Age of Onset   • Heart Disease Mother    • Diabetes Father    • Kidney Disease Father         MEDICATIONS:  Current Facility-Administered Medications   Medication Dose   • Respiratory Care per Protocol     • Pharmacy Consult Request ...Pain Management Review 1 Each  1 Each   • oxycodone immediate-release (ROXICODONE) tablet 5 mg  5 mg   • tramadol (ULTRAM) 50 MG tablet 50 mg  50 mg   • artificial tears 1.4 % ophthalmic solution 1 Drop  1 Drop   • hydrALAZINE (APRESOLINE) tablet 25 mg  25 mg   • mag hydrox-al hydrox-simeth (MAALOX PLUS ES or MYLANTA DS) suspension 20 mL  20 mL   • trazodone (DESYREL) tablet 50 mg  50 mg   • sodium chloride (OCEAN) 0.65 % nasal spray 2 Spray  2 Spray       ALLERGIES:  Patient has no known allergies.    PSYCHOSOCIAL  HISTORY:  Pre-mobidly, the patient lived in a single level home in [place] with his spouse.  Number of stairs:  3 steps to enter one level home.  for 27 years. 4 sons and multiple grandchildren.  Substance use history:  1/2-1 ppd, 3-4 beers daily, no drugs  Employment history: used to be a , was a Medic in the [employer]      LEVEL OF FUNCTION PRIOR TO DISABILTY:  Prior Living Situation:   Housing / Facility: 1 Story House  Steps Into Home: 3  Steps In Home: 0  Lives with - Patient's Self Care Capacity: Spouse  Equipment Owned:  (pick-up walker )     Prior Level of Function / Living Situation:   Physical Therapy: Prior Services: None  Housing / Facility: 1 Story House  Steps Into Home: 3  Steps In Home: 0  Bathroom Set up: Walk In Shower  Equipment Owned:  (pick-up walker )  Lives with - Patient's Self Care Capacity: Spouse  Bed Mobility: Independent  Transfer Status: Independent  Ambulation: Independent  Assistive Devices Used: Front-Wheel Walker  Stairs: Independent  Current Level of Function:   Level Of Assist: Minimal Assist  Assistive Device: Front Wheel Walker  Distance (Feet): 20  Deviation: Ataxic, Bradykinetic, Decreased Heel Strike, Decreased Toe Off  Supine to Sit: Moderate Assist  Sit to Supine:  (left up in bedside chair)  Scooting: Contact Guard Assist  Rolling: Minimum Assist to Lt.  Sit to Stand: Minimal Assist  Bed, Chair, Wheelchair Transfer: Minimal Assist  Transfer Method: Stand Pivot (w/FWW)  Sitting in Chair: 1 hr +   Sitting Edge of Bed: 10 minutes  Standing: 10 minutes  Occupational Therapy:   Self Feeding: Independent  Grooming / Hygiene: Independent  Bathing: Independent  Dressing: Independent  Toileting: Independent  Medication Management: Independent  Laundry: Independent  Kitchen Mobility: Independent  Finances: Independent  Home Management: Independent  Shopping: Independent  Prior Level Of Mobility: Independent Without Device in Community  Driving / Transportation:  "Driving Independent  Prior Services: None  Housing / Facility: [address]  Occupation (Pre-Hospital Vocational): Retired Due To Age  Current Level of Function:   Eating: Minimal Assist (for set-up; modified grasp on fork due to weakness )  Bathing: Maximal Assist  Upper Body Dressing: Moderate Assist (for managing C-collar and due to UE weakness )  Lower Body Dressing: Moderate Assist (unable to reach feet + weakness BUE and LE's )  Toileting: Moderate Assist    CURRENT LEVEL OF FUNCTION:   Same as level of function prior to admission to [facility]    PHYSICAL EXAM:     VITAL SIGNS:   height is 1.778 m (5' 10\") and weight is 75.6 kg (166 lb 10.7 oz). His temperature is 36.6 °C (97.8 °F). His blood pressure is 134/86 and his pulse is 67. His respiration is 18 and oxygen saturation is 92%.     GENERAL: No apparent distress  HEENT: PERRL, cervical collar in place  CARDIAC: Regular rate and rhythm, normal S1, S2. No murmurs, no edema  LUNGS: Clear to auscultation, loose non-productive cough, on 2 L O2, normal respiratory effort  ABDOMINAL: bowel sounds present, soft, nontender and nondistended    EXTREMITIES: no spasticity, no edema or no calf tenderness bilaterally    NEURO:    Mental status:  A&Ox4 (person, place, date, situation) answers questions appropriately follows commands    Motor:      Shoulder flexors:  Right -  5/5, Left -  4-/5  Elbow flexors:  Right -  5/5, Left -  4+/5  Wrist extensors:  Right -  5/5, Left -  4/5  Wrist flexors:  Right -  5/5, Left -  4/5  Elbow extensors:  Right -  5/5, Left -  4/5  Finger flexors:  Right -  5/5, Left -  3/5  Finger abductors:  Right -  5/5, Left -  3/5  Hip flexors:  Right -  4+/5, Left -  4+/5  Knee ext:  Right -  5/5, Left -  5/5  Dorsiflexors:  Right -  5/5, Left -  5/5  EHL:  Right -  5/5, Left -  5/5  Plantar flexors:  Right -  5/5, Left -  5/5    Sensory:   intact to light touch through out    DTRs: 3+ in bilateral biceps, triceps, " brachioradialis, 3+ in bilateral patellar and achilles tendons  No clonus at bilateral ankles  Negative babinski b/l  Negative Yen b/l     Tone: no spasticity noted, no cogwheeling noted    Coordination:   impaired finger to nose bilaterally, left worse than right    PRIMARY REHAB DIAGNOSIS:    This patient is a 77 y.o. male admitted for acute inpatient rehabilitation with cervical myelopathy (nontraumatic), from severe cervical spinal stenosis s/p urgent C3-C5 laminectomy with C3-C4 posterior instrumented fusion by Dr. Forbes.    IMPAIRMENTS:   ADLs/IADLs  Mobility    SECONDARY DIAGNOSIS/MEDICAL CO-MORBIDITIES AFFECTING FUNCTION:    Hypertension  BPH  UTI    RELEVANT CHANGES SINCE PREADMISSION EVALUATION:    Status unchanged    The patient's rehabilitation potential is Excellent  The patient's medical prognosis is excellent    PLAN:   Discussion and Recommendations:   1. The patient requires an acute inpatient rehabilitation program with a coordinated program of care at an intensity and frequency not available at a lower level of care. This recommendation is substantiated by the patient's medical physicians who recommend that the patient's intervention and assessment of medical issues needs to be done at an acute level of care for patient's safety and maximum outcome.     2. A coordinated program of care will be supplied by an interdisciplinary team of physical therapy, occupational therapy, rehab physician, rehab nursing, and, if needed, speech therapy and rehab psychology. Rehab team presents a patient-specific rehabilitation and education program concentrating on prevention of future problems related to accessibility, mobility, skin, bowel, bladder, sexuality, and psychosocial and medical/surgical problems.     3. Need for Rehabilitation Physician: The rehab physician will be evaluating the patient on a multi-weekly basis to help coordinate the program of care. The rehab physician communicates between medical  physicians, therapists, and nurses to maximize the patient's potential outcome. Specific areas in which the rehab physician will be providing daily assessment include the following:   A. Assessing the patient's heart rate and blood pressure response (vitals monitoring) to activity and making adjustments in medications or conservative measures as needed.   B. The rehab physician will be assessing the frequency at which the program can be increased to allow the patient to reach optimal functional outcome.   C. The rehab physician will also provide assessments in daily skin care, especially in light of patient's impairments in mobility.   D. The rehab physician will provide special expertise in understanding how to work with functional impairment and recommend appropriate interventions, compensatory techniques, and education that will facilitate the patient's outcome.     4. Rehab R.N.   The rehab RN will be working with patient to carry over in room mobility and activities of daily living when the patient is not in 3 hours of skilled therapy. Rehab nursing will be working in conjunction with rehab physician to address all the medical issues above and continue to assess laboratory work and discuss abnormalities with the treating physicians, assess vitals, and response to activity, and discuss and report abnormalities with the rehab physician. Rehab RN will also continue daily skin care, supervise bladder/bowel program, instruct in medication administration, and ensure patient safety.     5. Therapies to treat at intensity and frequency of (may change after completion of evaluation by all therapeutic disciplines):       PT:  Physical therapy to address mobility, transfer, gait training and evaluation for adaptive equipment needs 1.5hour/day at least 5 days/week for the duration of the ELOS (see below)       OT:  Occupational therapy to address ADLs, self-care, home management training, functional mobility/transfers and  assistive device evaluation, and community re-integration 1.5hour/day at least 5 days/week for the duration of the ELOS (see below).          6. Medical management / Rehabilitation Issues/Adverse Potential affecting function as part of rehabilitation plan.    Hypertension - continue bicoprolol.    BPH - check PVRs. Continue finasteride.    UTI - continue Keflex. Check UA.    REHABILITATION ISSUES/ADVERSE POTENTIAL:  1.  Cervical myelopathy: Patient demonstrates functional deficits in strength, balance, coordination, and ADL's. Patient is admitted to [facility] for comprehensive rehabilitation therapy as described below.   Rehabilitation nursing monitors bowel and bladder control, educates on medication administration, co-morbidities and monitors patient safety.    2.  Neurostimulants: None at this time but continue to assess daily for need to initiate should status change.    3.  DVT prophylaxis:  Patient is on Lovenox for anticoagulation upon transfer. Encourage OOB. Monitor daily for signs and symptoms of DVT including but not limited to swelling and pain to prevent the development of DVT that may interfere with therapies.    4.  Pain: No issues with pain currently / Controlled with as need oral analgesics.    5.  Nutrition/Dysphagia: Dietician monitors nutrient intake, recommend supplements prn and provide nutrition education to pt/family to promote optimal nutrition for wound healing/recovery.     6.  Bladder/bowel:  Start bowel and bladder program as described below, to prevent constipation, urinary retention (which may lead to UTI), and urinary incontinence (which will impact upon pt's functional independence).   - TV Q3h while awake with post void bladder scans, I&O cath for PVRs >400  - up to commode after meal     7.  Skin/dermal ulcer prophylaxis: Monitor for new skin conditions with q.2 h. turns as required to prevent the development of skin breakdown.     8.  Cognition/Behavior:   Psychologist Dr. Monsivais provides adjustment counseling to illness and psychosocial barriers that may be potential barriers to rehabilitation.     10. Respiratory therapy: RT performs O2 management prn, breathing retraining, pulmonary hygiene and bronchospasm management prn to optimize participation in therapies.    Pt was seen today for >70 min, and entire time spent in face-to-face contact was >50% in counseling and coordination of care as detailed in A/P above.        GOALS/EXPECTED LEVEL OF FUNCTION BASED ON CURRENT MEDICAL AND FUNCTIONAL STATUS (may change based on patient's medical status and rate of impairment recovery):  Transfers:   Modified Independent  Mobility/Gait:   Modified Independent  ADL's:   Modified Independent    DISPOSITION: Discharge to pre-morbid independent living setting with the supportive care of patient's spouse.      ELOS: 10-14 days

## 2017-12-25 NOTE — PROGRESS NOTES
[facility] Hospitalist Progress Note    Date of Service: 2017    Chief Complaint  77 y.o. male admitted 2017 with neck pain and neck numbness    Interval Problem Update  Pod #3 neck surgery    See OP note      Neck pain, intensity 8/10, spasm, sharp.    afebrile    Consultants/Specialty  Dr garcia    Disposition  pending        Review of Systems   Constitutional: Negative.    HENT: Negative.    Eyes: Negative.    Cardiovascular: Negative.    Musculoskeletal: Positive for myalgias and neck pain.   Skin: Negative.    Neurological: Negative.  Speech change:    Psychiatric/Behavioral: Negative.       Physical Exam  Laboratory/Imaging   Hemodynamics  Temp (24hrs), Av.8 °C (98.2 °F), Min:36.5 °C (97.7 °F), Max:37 °C (98.6 °F)   Temperature: 36.5 °C (97.7 °F)  Pulse  Av.7  Min: 57  Max: 96    Blood Pressure : 131/77      Respiratory      Respiration: 18, Pulse Oximetry: 90 %        RUL Breath Sounds: Clear, RML Breath Sounds: Clear;Diminished, RLL Breath Sounds: Clear;Diminished, LATRICIA Breath Sounds: Clear, LLL Breath Sounds: Clear;Diminished    Fluids    Intake/Output Summary (Last 24 hours) at 17 0901  Last data filed at 17 1704   Gross per 24 hour   Intake                0 ml   Output              330 ml   Net             -330 ml       Nutrition  Orders Placed This Encounter   Procedures   • Diet Order     Standing Status:   Standing     Number of Occurrences:   1     Order Specific Question:   Diet:     Answer:   Regular [1]     Physical Exam   Constitutional: He is oriented to person, place, and time. No distress.   HENT:   Neck collar in place   Eyes: Left eye exhibits no discharge.   Neck: No JVD present. No tracheal deviation present. No thyromegaly present.   Cardiovascular: Normal rate.  Exam reveals no gallop and no friction rub.    No murmur heard.  Pulmonary/Chest: Breath sounds normal. No respiratory distress. He has no wheezes. He has no rales.   Abdominal: Soft. Bowel sounds are  normal. He exhibits no distension. There is no tenderness. There is no guarding.   Musculoskeletal: He exhibits no edema or deformity.   Neurological: He is alert and oriented to person, place, and time. No cranial nerve deficit. Coordination normal.   Skin: He is not diaphoretic.                                Assessment/Plan     * Cervical cord myelomalacia (CMS-HCC)- (present on admission)   Assessment & Plan    Pod # 3  cervical spinal fusion and decompression today          Preoperative evaluation to rule out surgical contraindication- (present on admission)   Assessment & Plan    - Does have a 20-pack-year smoking history  - Labs today are unremarkable  - No significant cardiac history other than hypertension and hyperlipidemia  - Patient is low to moderate risk for moderate risk surgery        Smoking history- (present on admission)   Assessment & Plan    - 20-pack-year smoking history  - No longer smokes        refer to rehab    Montero catheter::  No Montero  DVT prophylaxis pharmacological::  Enoxaparin (Lovenox)

## 2017-12-25 NOTE — PREADMISSION SCREENING NOTE
Pre-Admission Screening Form    Patient Information:   Name: Bartolo Garcia     MRN: [id number]       : 1940      Age: 77 y.o.   Gender: male      Race: White [7]       Marital Status:  [2]  Family Contact: Karina Garcia        Relationship: Spouse [17]  Home Phone: [phone]           Cell Phone:   Advanced Directives: Unknown  Code Status:  FULL  Current Attending Provider: Raji Johnson M.D.  Referring Physician: Dian cage PA-C    Physiatrist Consult: Dr. Raul Powers       Referral Date: 17  Primary Payor Source:  MEDICARE  Secondary Payor Source:  [facility]    Medical Information:   Date of Admission to Acute Care [place]2017  Room Number: T331/02  Rehabilitation Diagnosis: 08.9 Other Orthopedic  Immunization History   Administered Date(s) Administered   • Pneumococcal Conjugate Vaccine (Prevnar/PCV-13) 2017     No Known Allergies  Past Medical History:   Diagnosis Date   • Hyperlipidemia    • Hypertension      Past Surgical History:   Procedure Laterality Date   • CERVICAL FUSION POSTERIOR N/A 2017    Procedure: CERVICAL FUSION POSTERIOR- C3-6;  Surgeon: Tacos Forbes M.D.;  Location: SURGERY [facility];  Service: Neurosurgery   • OTHER ORTHOPEDIC SURGERY         History Leading to Admission, Conditions that Caused the Need for Rehab (CMS):     Amanuel Rueda M.D. Physician Signed Hospital Medicine  H&P Date of Service: 2017  9:24 PM      Expand All Collapse All    []Hide copied text  []Hover for attribution information   Hospital Medicine History and Physical        Date of Service  2017     Chief Complaint  No chief complaint on file.  Severe myelomalacia of the cervical spine     History of Presenting Illness  Jose is a 77 y.o. male w/h/o hypertension and hyperlipidemia who presents with severe myelomalacia of the cervical spine. Patient has been having pain in his hands and neck back and feet for the past several months. It got worse  over the past three weeks. He eventually went to see Dr. garcia from neurosurgery who recommended that the patient have a urgent cervical spine fusion/decompression. Thus hospitalist was consulted for direct admission.           Assessment/Plan       I anticipate this patient is appropriate for observation status at this time.     No new Assessment & Plan notes have been filed under this hospital service since the last note was generated.  Service: Hospital Medicine        Prophylaxis:  SCDs          Tacos Garcia M.D. Physician Signed Surgery Neurosurgery  OP Report Date of Service: 12/22/2017  6:39 PM         []Hide copied text  []Hover for attribution information  DATE OF SERVICE:  12/22/2017     PREOPERATIVE DIAGNOSES:  Severe critical stenosis at C3-C4 with C5-C6   foraminal stenosis and C3-C4 myelomalacia.     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     PRINCIPAL PROCEDURE PERFORMED:  1.  C3, C4, C5 decompressive laminectomy, bilateral medial facetectomies and   bilateral foraminotomies.  2.  C3-C4 nonsegmental instrumentation with Medtronic lateral mass screws.  3.  C3-C4 posterolateral fusion.     SURGEON:  MD Raul Gutierrez M.D. Physician Signed Physical Medicine & Rehab  Consults Date of Service: 12/23/2017  1:47 PM   Consult Orders:   IP CONSULT FOR PHYSIATRY [[id number]] ordered by Dian Anderson P.A.-C. at 12/23/17 1251      Expand All Collapse All    []Hide copied text  []Hover for attribution information  Medical chart review completed.      Patient is a 77 y.o. year-old male with a past medical history significant for hypertension and hyperlipidemia admitted to [facility] on 12/21/2017  3:57 PM with symptoms of cervical myelopathy for urgent decompression and fusion by   Andrei.     The patient had reportedly come to see Dr. Forbes in outpatient clinic on December 21 due to a month long history of neck pain, progressive numbness, and weakness in his bilateral hands. He also developed difficulty walking. He was noted to be dropping things and having difficulty with gait. He progressed to using a front wheeled walker for ambulation.     I reviewed the images of an outside MRI of the cervical spine dated December 21, 2017 which appears to show severe spinal stenosis at the C3-C4 level with associated focal T2 cord signal abnormality at that level.     On December 22, 2017, the patient underwent C3-C5 laminectomy with C3-C4 posterior instrumented fusion by Dr. Forbes. Intraoperatively there was notation of severe facet arthropathy at the left C3-4 facet joint and left C4-5 facet joint. There is notation of good decompression of the thecal sac which remained also towel.     Postoperatively, he is doing well. His left upper extremity strength is reportedly 4/5 which is reportedly an improvement over the preoperative exam. He also has proprioceptive deficits per the neurosurgery report. He is wearing an Aspen collar.  He has a Hemovac in place with 120 mL out today.     Notes indicate that prophylactic anticoagulation can be started 24 hours after removal of the drain.     He remains on postoperative Ancef through 1900 on 12/24. Pain is being managed with oral oxycodone. He has not had any fentanyl since 12/22.  He had some postoperative hypertension to 181/80 but today, the blood pressure has improved to 128/77 upon resumption of medications.        He was evaluated by physical therapy on December 23 and was found to be min assist for bed mobility and gait utilizing a front-wheeled walker. He was evaluated by occupational therapy on December 23 and found to be moderate assist for lower body dressing, toileting, and upper body dressing, and he was maximal assist for bathing.         PSYCHOSOCIAL  HISTORY:  Living Site:  Home  Living With:  spouse  Caregiver's availability: His wife is not able to provide significant physical assistance due to her back issues.  Number of stairs:  2  Substance use history: He is a former smoker with 20 pack years and drinks alcohol occasionally.        The patient presents  with  functional deficits in mobility/self-cares, and Minimal  de-conditioning. Pre-morbidly, this patient lived in a single level home with Two steps to enter,with spouse  The patient was evaluated by acute care Physical Therapy and Occupational Therapy; currently requiring minimal assistance for mobility and moderate assistance for ADLs. The patient's current diet is Regular with Thin liquids.      The patient is   a Very Good candidate for an acute inpatient rehabilitation program with a coordinated program of care at an intensity and frequency not available at a lower level of care when medically cleared and insurance has authorized.      The patient must be off of IV pain medications prior to admission for rehabilitation.     Note: if the patient continues to improve while waiting for medical clearance/insurance authorization, and no longer requires 2 of of 3 therapy services (PT/OT/SLP), the patient will no longer meet criteria for acute inpatient rehabilitation.     This recommendation is substantiated by the patient's current medical condition with intervention and assessment of medical issues requiring an acute level of care for patient's safety and maximum outcome. A coordinated program of care will be provided by an interdisciplinary team including physical therapy, occupational therapy, physiatry, rehab nursing and rehab psychology. Rehab goals include improved mobility, self-care management, strength and conditioning/endurance, pain management, bowel and bladder management, mood and affect, and safety with independent home management including caregiver training.      Estimated length of stay  is approximately 10-14 days. Rehab potential: Very Good. Disposition: to pre-morbid independent living setting with supportive care of patient's spouse. We will continue to follow with you in anticipation of discharge to acute inpatient rehabilitation when medically stable to do so at the discretion of him attending physician. Thank you for the consultation. Please call with any questions regarding this recommendation.     Raul Powers M.D.  Spinal Cord Injury Medicine     Co-morbidities: See above  Potential Risk - Complications: Deep Vein Thrombosis, Malnutrition, Pain, Perceptual Impairment, Pneumonia, Pressure Ulcer and Infection  Level of Risk: High    Ongoing Medical Management Needed (Medical/Nursing Needs):   Patient Active Problem List    Diagnosis Date Noted   • Cervical cord myelomalacia (CMS-HCC) 12/21/2017     Priority: High   • Smoking history 12/21/2017   • Preoperative evaluation to rule out surgical contraindication 12/21/2017     Lexi Corbin R.N. Registered Nurse Signed   Progress Notes Date of Service: 12/24/2017  7:45 PM         []Hide copied text  []Hover for attribution information  Bedside report received by FELIX Akhtar and care assumed for patient at 1900. Pt A&O X 4,  On 1L oxygen via NC. VSS.  Voiding via urinal and up to bathroom. Pt has numbness and tingling to bilateral hands and feet, pt states that this is improving since surgery. SCDs to BLE.   Pt calls appropriately.  Pt sitting up in bed watching tv. Plan of care discussed including pain management, mobilization, utilizing the call light. Hourly rounding in place.  Call light and belongings at bedside and within reach.  Bed in lowest position.         Current Vital Signs:   Temperature: 36.5 °C (97.7 °F) Pulse: 96 Respiration: 18 Blood Pressure : 131/77  Weight: 75.4 kg (166 lb 3.6 oz)    Pulse Oximetry: 90 % O2 (LPM): 1      Completed Laboratory Reports:  No results for input(s): WBC, HEMOGLOBIN, HEMATOCRIT, PLATELETCT,  SODIUM, POTASSIUM, BUN, CREATININE, ALBUMIN, GLUCOSE, POCGLUCOSE, INR in the last 72 hours.  Additional Labs: Not Applicable    Prior Living Situation:   Housing / Facility: 1 Story House  Steps Into Home: 2  Steps In Home: 0  Lives with - Patient's Self Care Capacity: Spouse  Equipment Owned:  (pick-up walker )    Prior Level of Function / Living Situation:   Physical Therapy: Prior Services: None  Housing / Facility: 1 Story House  Steps Into Home: 2  Steps In Home: 0  Bathroom Set up: Walk In Shower  Equipment Owned:  (pick-up walker )  Lives with - Patient's Self Care Capacity: Spouse  Bed Mobility: Independent  Transfer Status: Independent  Ambulation: Independent  Assistive Devices Used: Front-Wheel Walker  Stairs: Independent  Current Level of Function:   Level Of Assist: Minimal Assist  Assistive Device: Front Wheel Walker  Distance (Feet): 20  Deviation: Ataxic, Bradykinetic, Decreased Heel Strike, Decreased Toe Off  Supine to Sit: Moderate Assist  Sit to Supine:  (left up in bedside chair)  Scooting: Contact Guard Assist  Rolling: Minimum Assist to Lt.  Sit to Stand: Minimal Assist  Bed, Chair, Wheelchair Transfer: Minimal Assist  Transfer Method: Stand Pivot (w/FWW)  Sitting in Chair: 1 hr +   Sitting Edge of Bed: 10 minutes  Standing: 10 minutes  Occupational Therapy:   Self Feeding: Independent  Grooming / Hygiene: Independent  Bathing: Independent  Dressing: Independent  Toileting: Independent  Medication Management: Independent  Laundry: Independent  Kitchen Mobility: Independent  Finances: Independent  Home Management: Independent  Shopping: Independent  Prior Level Of Mobility: Independent Without Device in Community  Driving / Transportation: Driving Independent  Prior Services: None  Housing / Facility: [address] [facility]  Occupation (Pre-Hospital Vocational): Retired Due To Age  Current Level of Function:   Eating: Minimal Assist (for set-up; modified grasp on fork due to weakness )  Bathing: Maximal  Assist  Upper Body Dressing: Moderate Assist (for managing C-collar and due to UE weakness )  Lower Body Dressing: Moderate Assist (unable to reach feet + weakness BUE and LE's )  Toileting: Moderate Assist  Speech Language Pathology:      Rehabilitation Prognosis/Potential: Good  Estimated Length of Stay: 10-14 days    Nursing:   Orientation : Oriented x 4  Continent    Scope/Intensity of Services Recommended:  Physical Therapy: 1.5 hr / day  5 days / week. Therapeutic Interventions Required: Maximize Endurance, Mobility, Strength and Safety  Occupational Therapy: 1.5 hr / day 5 days / week. Therapeutic Interventions Required: Maximize Self Care, ADLs, IADLs and Energy Conservation  Rehabilitation Nursin/7. Therapeutic Interventions Required: Monitor Pain, Skin, Vital Signs, Intake and Output, Labs, Safety, Aspiration Risk, Family Training and surgical incision care; Bowel & bladder regimen; DVT porphylaxis; ADL's.  Rehabilitation Physician: 3 - 5 days / week. Therapeutic Interventions Required: Medical Management  Respiratory Care: Daily. Therapeutic Interventions Required: Pulmonary Toileting, O2 Weaning, Aspiration Risk and Respiratory care per protocol.   Dietician: Consult. Therapeutic Interventions Required: Nutritional evaluation with recommendations to promote optimal health/healing.     Rehabilitation Goals and Plan (Expected frequency & duration of treatment in the IRF):   Return to the Community, Modified Independent Level of Care and Outpatient Support  Anticipated Date of Rehabilitation Admission: 17  Patient/Family oriented IRF level of care/facility/plan: Yes  Patient/Family willing to participate in IRF care/facility/plan: Yes  Patient able to tolerate IRF level of care proposed: Yes  Patient has potential to benefit IRF level of care proposed: Yes  Comments: Not Applicable    Special Needs or Precautions - Medical Necessity:  Safety Concerns/Precautions:  Fall Risk / High Risk for Falls  and Balance  Complex Wound Care: Cervical spine surgical incision care  Pain Management  Special Equipment: Aspen collar  Requires Oxygen  C-Spine Precautions  Diet:   DIET ORDERS (Through next 24h)    Start     Ordered    12/22/17 1845  Diet Order  ALL MEALS     Question:  Diet:  Answer:  Regular    12/22/17 1847          Anticipated Discharge Destination / Patient/Family Goal:  Destination: Home with Assistance Support System: Spouse  Anticipated home health services: OT, PT and Nursing  Previously used HH service/ provider: Not Applicable  Anticipated DME Needs: To be determined  Outpatient Services: To be determined  Alternative resources to address additional identified needs:   N/A  Pre-Screen Completed: 12/25/2017 10:17 AM Charlene Fitzgerald R.N.

## 2017-12-25 NOTE — PROGRESS NOTES
Neurosurgery Progress Note    Subjective:  OOB and ambulatory yesterday   Voiding, passing flatus, BM yesterday   Pain well controlled on oral medications  Denies new pain, numbness, weakness.   Denies HA, positional HA, N/V, dizziness, chest pain, SOB, difficulty breathing, chills  Improved use of right hand dexterity      Exam:  VSS  A&Ox4, NAD  No hoarseness of voice.   No nuchal rigidity   NM: 5/5 deltoid, biceps, triceps, handgrip, intrinsics except LUE deltoid and tricep 4+, handgrip bilaterally 4 to 4+   Sensation intact all four extremities, decreased to LT, better in comparison with preop   Demonstrates improved dexterity - able to feed himself   Abdomen: soft, non-tender  Pulmonary: non-labored breathing on 1L NC  normal respiratory effort  No LE edema, erythema, cyanosis, clubbing  Calves non-tender to compression bilat  Incision CDI, margins well approximated, no signs of infection  C-collar being worn appropriately      BP  Min: 127/80  Max: 164/102  Pulse  Av.8  Min: 70  Max: 96  Resp  Av.6  Min: 16  Max: 18  Temp  Av.8 °C (98.2 °F)  Min: 36.5 °C (97.7 °F)  Max: 37 °C (98.6 °F)  SpO2  Av.8 %  Min: 90 %  Max: 94 %    No Data Recorded        No results for input(s): SODIUM, POTASSIUM, CHLORIDE, CO2, GLUCOSE, BUN, CPKTOTAL in the last 72 hours.            Intake/Output       17 - 17 0659 17 - 17 0659       Total  Total       Intake    Total Intake -- -- -- -- -- --       Output    Urine  300  -- 300  --  -- --    Number of Times Voided 2 x 2 x 4 x 1 x -- 1 x    Void (ml) 300 -- 300 -- -- --    Drains  30  -- 30  --  -- --    Hemovac 1 30 -- 30 -- -- --    Stool  --  -- --  --  -- --    Number of Times Stooled 1 x -- 1 x 0 x -- 0 x    Total Output 330 -- 330 -- -- --       Net I/O     -330 -- -330 -- -- --            Intake/Output Summary (Last 24 hours) at 17 1697  Last data filed at 17 8639   Gross per  24 hour   Intake                0 ml   Output              330 ml   Net             -330 ml            • enoxaparin (LOVENOX) injection  40 mg DAILY   • Pharmacy Consult Request  1 Each PRN   • MD ALERT...Do not administer NSAIDS or ASPIRIN unless ORDERED By Neurosurgery  1 Each PRN   • docusate sodium  100 mg BID   • senna-docusate  1 Tab Nightly   • senna-docusate  1 Tab Q24HRS PRN   • polyethylene glycol/lytes  1 Packet BID PRN   • magnesium hydroxide  30 mL QDAY PRN   • bisacodyl  10 mg Q24HRS PRN   • fleet  1 Each Once PRN   • acetaminophen  500 mg Q6HRS   • diphenhydrAMINE  25 mg Q6HRS PRN    Or   • diphenhydrAMINE  25 mg Q6HRS PRN   • ondansetron  4 mg Q4HRS PRN   • ondansetron  4 mg Q4HRS PRN   • cyclobenzaprine  10 mg Q8HRS PRN   • temazepam  15 mg HS PRN - MR X 1   • clonidine  0.1 mg Q4HRS PRN   • benzocaine-menthol  1 Lozenge Q2HRS PRN   • calcium carbonate  500 mg BID   • vitamin D  5,000 Units DAILY   • hydrocodone/acetaminophen  1-2 Tab Q4HRS PRN   • oxycodone-acetaminophen  1-2 Tab Q6HRS PRN   • morphine injection  2 mg Q4HRS PRN   • fluticasone  2 Spray DAILY   • finasteride  5 mg DAILY   • bisoprolol  5 mg DAILY   • acetaminophen  650 mg Q6HRS PRN   • Pharmacy Consult Request   PRN    And   • oxycodone immediate-release  2.5 mg Q3HRS PRN    And   • oxycodone immediate-release  5 mg Q3HRS PRN    And   • morphine injection  2 mg Q3HRS PRN       Assessment and Plan:  Hospital day #5  POD #4 C3-5 LAMI/C3-4 INSTRUMENTAION/FUSION  Prophylactic anticoagulation: OK to start today, lovenox BID     Patient looks good.  Increase ambulation to TID with nursing    Good rehab candidate ok to discharge to rehab from neurosurgery standpoint, would appreciate medical clearance per rehab request   Continue pain control, mobilization  Ambulate, work with therapies  Continue incentive spirometry Q1H, wean O2   Continue stool softeners to encourage BM    Appreciate hospitalist help in care and management of this  patient   Hopefully pt accepted and d/c to rehab tomorrow, may need to consider SNF consult   Patient agrees with treatment plan.   Case discussed with Dr. Forbes.

## 2017-12-25 NOTE — DISCHARGE PLANNING
Dr. Edwards has accepted Bartolo to inpatient rehab. Transport to [facility] is scheduled at 1:30pm today via [facility] transport. Nursing to call report to [phone]. Bedside RN Anali aware. ERIN Hutchinson aware. Unsuccessful attempt to reach spouse by phone.

## 2017-12-25 NOTE — DISCHARGE SUMMARY
CHIEF COMPLAINT ON ADMISSION  No chief complaint on file.      CODE STATUS  Full Code    HPI & HOSPITAL COURSE  Jose is a 77 y.o. male w/h/o hypertension and hyperlipidemia who presents with severe myelomalacia of the cervical spine. Patient has been having pain in his hands and neck back and feet for the past several months. It got worse over the past three weeks. He eventually went to see Dr. garcia from neurosurgery who recommended that the patient have a urgent cervical spine fusion/decompression. Thus hospitalist was consulted for direct admission.    He under went cervical decompression on 12/22. Neck brace in place 24 hours a day. Cervical drain removed on 12/24  He was referred to rehab and accepted    Keflex was started on 12/25 and needs to continue for total of 7 days.    His hospital course was otherwise uncomplicated and was a very pleasant patient.    Therefore, he is discharged in good and stable condition with close outpatient follow-up.    SPECIFIC OUTPATIENT FOLLOW-UP  Dr garcia neurosurgery    DISCHARGE PROBLEM LIST  Principal Problem:    Cervical cord myelomalacia (CMS-HCC) POA: Yes  Active Problems:    Smoking history POA: Yes  Resolved Problems:    Preoperative evaluation to rule out surgical contraindication POA: Yes      FOLLOW UP  No future appointments.  CAROL Haile D.O.  [address]  [facility] [address]  [phone]    Schedule an appointment as soon as possible for a visit in 1 week  Hospital follow-up appointment with PCP      MEDICATIONS ON DISCHARGE   Bartolo Garcia   Home Medication Instructions BHARAT:[id number]    Printed on:12/25/17 3478   Medication Information                      bisoprolol (ZEBETA) 5 MG TABS  Take 5 mg by mouth every day.             calcium carbonate (TUMS) 500 MG Chew Tab  Take 1 Tab by mouth 2 Times a Day.             cephALEXin (KEFLEX) 500 MG Cap  Take 1 Cap by mouth every 6 hours.             cyclobenzaprine (FLEXERIL) 10 MG Tab  Take 1 Tab by  mouth every 8 hours as needed for Muscle Spasms.             docusate sodium 100 MG Cap  Take 100 mg by mouth 2 times a day.             enoxaparin (LOVENOX) 30 MG/0.3ML Solution inj  Inject 0.3 mL as instructed every 12 hours.             finasteride (PROSCAR) 5 MG TABS  Take 5 mg by mouth every day.             fluticasone (FLONASE) 50 MCG/ACT nasal spray  Spray 2 Sprays in nose every day. Each Nostril             hydrocodone/acetaminophen (NORCO)  MG Tab  Take 1-2 Tabs by mouth every four hours as needed for up to 30 days.             ondansetron (ZOFRAN ODT) 4 MG TABLET DISPERSIBLE  Take 1 Tab by mouth every four hours as needed for Nausea (Nausea - Not appropriate if patient is vomiting.  Give PO if IV route is unavailable.). X             temazepam (RESTORIL) 15 MG Cap  Take 1 Cap by mouth at bedtime as needed.  May repeat 1 time. for up to 30 days.             vitamin D 5000 units Tab  Take 5,000 Units by mouth every day.                 DIET  Orders Placed This Encounter   Procedures   • Diet Order     Standing Status:   Standing     Number of Occurrences:   1     Order Specific Question:   Diet:     Answer:   Regular [1]       ACTIVITY  As tolerated.  Weight bearing as tolerated      CONSULTATIONS  Dr garcia    PROCEDURES  DATE OF SERVICE:  12/22/2017     PREOPERATIVE DIAGNOSES:  Severe critical stenosis at C3-C4 with C5-C6   foraminal stenosis and C3-C4 myelomalacia.     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     PRINCIPAL PROCEDURE PERFORMED:  1.  C3, C4, C5 decompressive laminectomy, bilateral medial facetectomies and   bilateral foraminotomies.  2.  C3-C4 nonsegmental instrumentation with Medtronic lateral mass screws.  3.  C3-C4 posterolateral fusion.     SURGEON:  Tacos Garcia MD    LABORATORY  Lab Results    Component Value Date/Time    SODIUM 142 12/21/2017 07:38 PM    POTASSIUM 3.7 12/21/2017 07:38 PM    CHLORIDE 109 12/21/2017 07:38 PM    CO2 26 12/21/2017 07:38 PM    GLUCOSE 154 (H) 12/21/2017 07:38 PM    BUN 16 12/21/2017 07:38 PM    CREATININE 0.70 12/21/2017 07:38 PM        Lab Results   Component Value Date/Time    WBC 7.8 12/21/2017 07:38 PM    HEMOGLOBIN 16.5 12/21/2017 07:38 PM    HEMATOCRIT 47.5 12/21/2017 07:38 PM    PLATELETCT 230 12/21/2017 07:38 PM        Total time of the discharge process exceeds 38 minutes

## 2017-12-25 NOTE — CARE PLAN
Problem: Mobility  Goal: Risk for activity intolerance will decrease    Intervention: Assess and monitor signs of activity intolerance  Patient performs log roll correctly and able to do independently, patient's gait steady with FWW one assist.       Problem: Skin Integrity  Goal: Risk for impaired skin integrity will decrease    Intervention: Assess and monitor skin integrity, appearance and/or temperature  Dressing clean dry and intact to posterior neck, no drainage noted, no skin breakdown noted from aspen collar, aspen collar fitted correctly.

## 2017-12-26 LAB
25(OH)D3 SERPL-MCNC: 29 NG/ML (ref 30–100)
ALBUMIN SERPL BCP-MCNC: 3.1 G/DL (ref 3.2–4.9)
ALBUMIN/GLOB SERPL: 1.1 G/DL
ALP SERPL-CCNC: 42 U/L (ref 30–99)
ALT SERPL-CCNC: 6 U/L (ref 2–50)
ANION GAP SERPL CALC-SCNC: 7 MMOL/L (ref 0–11.9)
APPEARANCE UR: CLEAR
AST SERPL-CCNC: 14 U/L (ref 12–45)
BACTERIA #/AREA URNS HPF: NEGATIVE /HPF
BASOPHILS # BLD AUTO: 0.5 % (ref 0–1.8)
BASOPHILS # BLD: 0.04 K/UL (ref 0–0.12)
BILIRUB SERPL-MCNC: 0.8 MG/DL (ref 0.1–1.5)
BILIRUB UR QL STRIP.AUTO: NEGATIVE
BUN SERPL-MCNC: 11 MG/DL (ref 8–22)
CALCIUM SERPL-MCNC: 9.5 MG/DL (ref 8.5–10.5)
CAOX CRY #/AREA URNS HPF: ABNORMAL /HPF
CHLORIDE SERPL-SCNC: 104 MMOL/L (ref 96–112)
CO2 SERPL-SCNC: 30 MMOL/L (ref 20–33)
COLOR UR: YELLOW
CREAT SERPL-MCNC: 0.63 MG/DL (ref 0.5–1.4)
EOSINOPHIL # BLD AUTO: 0.12 K/UL (ref 0–0.51)
EOSINOPHIL NFR BLD: 1.4 % (ref 0–6.9)
EPI CELLS #/AREA URNS HPF: NEGATIVE /HPF
ERYTHROCYTE [DISTWIDTH] IN BLOOD BY AUTOMATED COUNT: 46.1 FL (ref 35.9–50)
EST. AVERAGE GLUCOSE BLD GHB EST-MCNC: 108 MG/DL
GFR SERPL CREATININE-BSD FRML MDRD: >60 ML/MIN/1.73 M 2
GLOBULIN SER CALC-MCNC: 2.7 G/DL (ref 1.9–3.5)
GLUCOSE SERPL-MCNC: 90 MG/DL (ref 65–99)
GLUCOSE UR STRIP.AUTO-MCNC: NEGATIVE MG/DL
HBA1C MFR BLD: 5.4 % (ref 0–5.6)
HCT VFR BLD AUTO: 42.5 % (ref 42–52)
HGB BLD-MCNC: 14.7 G/DL (ref 14–18)
HYALINE CASTS #/AREA URNS LPF: ABNORMAL /LPF
IMM GRANULOCYTES # BLD AUTO: 0.03 K/UL (ref 0–0.11)
IMM GRANULOCYTES NFR BLD AUTO: 0.4 % (ref 0–0.9)
KETONES UR STRIP.AUTO-MCNC: NEGATIVE MG/DL
LEUKOCYTE ESTERASE UR QL STRIP.AUTO: ABNORMAL
LYMPHOCYTES # BLD AUTO: 1.55 K/UL (ref 1–4.8)
LYMPHOCYTES NFR BLD: 18.4 % (ref 22–41)
MCH RBC QN AUTO: 33.5 PG (ref 27–33)
MCHC RBC AUTO-ENTMCNC: 34.6 G/DL (ref 33.7–35.3)
MCV RBC AUTO: 96.8 FL (ref 81.4–97.8)
MICRO URNS: ABNORMAL
MONOCYTES # BLD AUTO: 1 K/UL (ref 0–0.85)
MONOCYTES NFR BLD AUTO: 11.8 % (ref 0–13.4)
NEUTROPHILS # BLD AUTO: 5.7 K/UL (ref 1.82–7.42)
NEUTROPHILS NFR BLD: 67.5 % (ref 44–72)
NITRITE UR QL STRIP.AUTO: NEGATIVE
NRBC # BLD AUTO: 0 K/UL
NRBC BLD-RTO: 0 /100 WBC
PH UR STRIP.AUTO: 6 [PH]
PLATELET # BLD AUTO: 194 K/UL (ref 164–446)
PMV BLD AUTO: 9.9 FL (ref 9–12.9)
POTASSIUM SERPL-SCNC: 3.8 MMOL/L (ref 3.6–5.5)
PROT SERPL-MCNC: 5.8 G/DL (ref 6–8.2)
PROT UR QL STRIP: NEGATIVE MG/DL
RBC # BLD AUTO: 4.39 M/UL (ref 4.7–6.1)
RBC # URNS HPF: ABNORMAL /HPF
RBC UR QL AUTO: NEGATIVE
SODIUM SERPL-SCNC: 141 MMOL/L (ref 135–145)
SP GR UR STRIP.AUTO: 1.02
UROBILINOGEN UR STRIP.AUTO-MCNC: 0.2 MG/DL
WBC # BLD AUTO: 8.4 K/UL (ref 4.8–10.8)
WBC #/AREA URNS HPF: ABNORMAL /HPF

## 2017-12-26 PROCEDURE — 97530 THERAPEUTIC ACTIVITIES: CPT

## 2017-12-26 PROCEDURE — 700112 HCHG RX REV CODE 229: Performed by: PHYSICAL MEDICINE & REHABILITATION

## 2017-12-26 PROCEDURE — 85025 COMPLETE CBC W/AUTO DIFF WBC: CPT

## 2017-12-26 PROCEDURE — A9270 NON-COVERED ITEM OR SERVICE: HCPCS | Performed by: PHYSICAL MEDICINE & REHABILITATION

## 2017-12-26 PROCEDURE — 97165 OT EVAL LOW COMPLEX 30 MIN: CPT

## 2017-12-26 PROCEDURE — 82306 VITAMIN D 25 HYDROXY: CPT

## 2017-12-26 PROCEDURE — 770010 HCHG ROOM/CARE - REHAB SEMI PRIVAT*

## 2017-12-26 PROCEDURE — 700111 HCHG RX REV CODE 636 W/ 250 OVERRIDE (IP): Performed by: PHYSICAL MEDICINE & REHABILITATION

## 2017-12-26 PROCEDURE — 97162 PT EVAL MOD COMPLEX 30 MIN: CPT

## 2017-12-26 PROCEDURE — 80053 COMPREHEN METABOLIC PANEL: CPT

## 2017-12-26 PROCEDURE — 83036 HEMOGLOBIN GLYCOSYLATED A1C: CPT

## 2017-12-26 PROCEDURE — 700102 HCHG RX REV CODE 250 W/ 637 OVERRIDE(OP): Performed by: PHYSICAL MEDICINE & REHABILITATION

## 2017-12-26 PROCEDURE — 97110 THERAPEUTIC EXERCISES: CPT

## 2017-12-26 PROCEDURE — 99233 SBSQ HOSP IP/OBS HIGH 50: CPT | Performed by: PHYSICAL MEDICINE & REHABILITATION

## 2017-12-26 PROCEDURE — 36415 COLL VENOUS BLD VENIPUNCTURE: CPT

## 2017-12-26 RX ADMIN — FLUTICASONE PROPIONATE 100 MCG: 50 SPRAY, METERED NASAL at 08:21

## 2017-12-26 RX ADMIN — ACETAMINOPHEN 500 MG: 500 TABLET ORAL at 05:06

## 2017-12-26 RX ADMIN — CEPHALEXIN 500 MG: 250 CAPSULE ORAL at 17:20

## 2017-12-26 RX ADMIN — TRAMADOL HYDROCHLORIDE 50 MG: 50 TABLET, FILM COATED ORAL at 21:19

## 2017-12-26 RX ADMIN — ACETAMINOPHEN 500 MG: 500 TABLET ORAL at 23:43

## 2017-12-26 RX ADMIN — CALCIUM CARBONATE (ANTACID) CHEW TAB 500 MG 500 MG: 500 CHEW TAB at 19:58

## 2017-12-26 RX ADMIN — FINASTERIDE 5 MG: 5 TABLET, FILM COATED ORAL at 08:21

## 2017-12-26 RX ADMIN — ACETAMINOPHEN 500 MG: 500 TABLET ORAL at 17:20

## 2017-12-26 RX ADMIN — CEPHALEXIN 500 MG: 250 CAPSULE ORAL at 11:37

## 2017-12-26 RX ADMIN — DOCUSATE SODIUM 100 MG: 100 CAPSULE, LIQUID FILLED ORAL at 08:21

## 2017-12-26 RX ADMIN — ENOXAPARIN SODIUM 30 MG: 100 INJECTION SUBCUTANEOUS at 08:21

## 2017-12-26 RX ADMIN — CALCIUM CARBONATE (ANTACID) CHEW TAB 500 MG 500 MG: 500 CHEW TAB at 08:21

## 2017-12-26 RX ADMIN — DOCUSATE SODIUM 100 MG: 100 CAPSULE, LIQUID FILLED ORAL at 19:57

## 2017-12-26 RX ADMIN — BISOPROLOL FUMARATE 5 MG: 5 TABLET, COATED ORAL at 08:21

## 2017-12-26 RX ADMIN — DOCUSATE SODIUM AND SENNOSIDES 1 TABLET: 8.6; 5 TABLET, FILM COATED ORAL at 19:57

## 2017-12-26 RX ADMIN — ACETAMINOPHEN 500 MG: 500 TABLET ORAL at 11:37

## 2017-12-26 RX ADMIN — CEPHALEXIN 500 MG: 250 CAPSULE ORAL at 05:06

## 2017-12-26 RX ADMIN — ENOXAPARIN SODIUM 30 MG: 100 INJECTION SUBCUTANEOUS at 19:59

## 2017-12-26 RX ADMIN — CEPHALEXIN 500 MG: 250 CAPSULE ORAL at 23:43

## 2017-12-26 RX ADMIN — CHOLECALCIFEROL TAB 25 MCG (1000 UNIT) 5000 UNITS: 25 TAB at 08:21

## 2017-12-26 ASSESSMENT — PAIN SCALES - GENERAL
PAINLEVEL_OUTOF10: 5
PAINLEVEL_OUTOF10: 0
PAINLEVEL_OUTOF10: ASSUMED PAIN PRESENT

## 2017-12-26 ASSESSMENT — BRIEF INTERVIEW FOR MENTAL STATUS (BIMS)
WHAT MONTH IS IT: ACCURATE WITHIN 5 DAYS
BIMS SUMMARY SCORE: 15
ASKED TO RECALL BED: YES, NO CUE REQUIRED
ASKED TO RECALL SOCK: YES, NO CUE REQUIRED
ASKED TO RECALL BLUE: YES, NO CUE REQUIRED
WHAT DAY OF THE WEEK IS IT: CORRECT
WHAT YEAR IS IT: CORRECT
INITIAL REPETITION OF BED BLUE SOCK - FIRST ATTEMPT: 3

## 2017-12-26 ASSESSMENT — ACTIVITIES OF DAILY LIVING (ADL): TOILETING: INDEPENDENT

## 2017-12-26 ASSESSMENT — GAIT ASSESSMENTS: DEVIATION: ATAXIC;STEP TO;DECREASED BASE OF SUPPORT;BRADYKINETIC;SHUFFLED GAIT;DECREASED HEEL STRIKE;DECREASED TOE OFF

## 2017-12-26 NOTE — CARE PLAN
Problem: Communication  Goal: The ability to communicate needs accurately and effectively will improve    Intervention: [place] patient and significant other/support system to call light to alert staff of needs  Patient is AOx4 educated regarding visiting policy, unit routine, call light and bedside controls. Patient educated regarding smoking policy, patient rights and responsibilities, and bedside rail. Will continue to orient and educate patient as needed.      Problem: Safety  Goal: Will remain free from falls    Intervention: Assess risk factors for falls  Patient oriented to unit routine, safety precautions are in place, patient demonstrated how to use call light and verbalizes understanding of safety precautions. Patient has aspen collar on at at all times, non-skid socks are on, bed in low position, call light close by.

## 2017-12-26 NOTE — PROGRESS NOTES
Patient admitted to facility at 1500 via w/c; accompanied by hospital transport. Patient assisted to room and positioned in bed for comfort and safety, call light within reach. Patient assisted with stowing belongings and oriented to room and facility. Admission assessment performed and documented in computer. Admission paperwork completed, signed copies placed in chart. Will continue to monitor.

## 2017-12-26 NOTE — THERAPY
Occupational Therapy Initial Plan of Care Note    1) Assessment:  Patient is 77 y.o. male with a diagnosis of cervical myelopathy (nontraumatic) from severe C-spine stenosis.  Additional factors influencing patient status / progress (ie: cognitive factors, co-morbidities, social support, etc): HTN, impaired FM coordination, hand weakness/numbness, pain, decreased activity tolerance, impaired balance and mobility, independent PLOF for ADLs, functional mobility and IADLs, supportive family, and motivated for independence. Long term and short term goals have been discussed with patient and they are in agreement.  2) Plan:  Recommend Occupational Therapy  minutes per day 5-7 days per week for 10-14 days for the following treatments:  OT Group Therapy, OT Self Care/ADL, OT Community Reintegration, OT Neuro Re-Ed/Balance, OT Sensory Int Techniques, OT Therapeutic Activity, OT Evaluation and OT Therapeutic Exercise.  3) Goals:  Please refer to care plan for goals.

## 2017-12-26 NOTE — PROGRESS NOTES
"Rehab Progress Note     Date of Service: 12/26/2017   Chief complaint: bladder accident, follow up cervical myelopathy    Interval Events (Subjective)    Patient seen and examined. He slept well last night. He denies any pain this am. He had a bladder accident due to urgency this am. No new complaints.    Objective:  VITAL SIGNS: /66   Pulse 95   Temp 36.6 °C (97.8 °F)   Resp 18   Ht 1.778 m (5' 10\")   Wt 75.6 kg (166 lb 10.7 oz)   SpO2 91%   BMI 23.91 kg/m²     Gen: alert, no apparent distress  HEENT: cervical collar in place  CV: regular rate and rhythm, no murmurs, mild 1+ peripheral edema at shins  Resp: clear to ascultation bilaterally, normal respiratory effort  GI: soft, non-tender abdomen, bowel sounds present  Neuro: notable for left arm monoparesis      Recent Results (from the past 72 hour(s))   URINALYSIS    Collection Time: 12/25/17  6:00 PM   Result Value Ref Range    Color Yellow     Character Clear     Specific Gravity 1.016 <1.035    Ph 6.0 5.0 - 8.0    Glucose Negative Negative mg/dL    Ketones Negative Negative mg/dL    Protein Negative Negative mg/dL    Bilirubin Negative Negative    Urobilinogen, Urine 0.2 Negative    Nitrite Negative Negative    Leukocyte Esterase Trace (A) Negative    Occult Blood Negative Negative    Micro Urine Req Microscopic    URINE MICROSCOPIC (W/UA)    Collection Time: 12/25/17  6:00 PM   Result Value Ref Range    WBC 0-2 (A) /hpf    RBC 0-2 (A) /hpf    Bacteria Negative None /hpf    Epithelial Cells Negative /hpf    Ca Oxalate Crystal Few /hpf    Hyaline Cast 0-2 /lpf   CBC with Differential    Collection Time: 12/26/17  5:21 AM   Result Value Ref Range    WBC 8.4 4.8 - 10.8 K/uL    RBC 4.39 (L) 4.70 - 6.10 M/uL    Hemoglobin 14.7 14.0 - 18.0 g/dL    Hematocrit 42.5 42.0 - 52.0 %    MCV 96.8 81.4 - 97.8 fL    MCH 33.5 (H) 27.0 - 33.0 pg    MCHC 34.6 33.7 - 35.3 g/dL    RDW 46.1 35.9 - 50.0 fL    Platelet Count 194 164 - 446 K/uL    MPV 9.9 9.0 - 12.9 fL    " Neutrophils-Polys 67.50 44.00 - 72.00 %    Lymphocytes 18.40 (L) 22.00 - 41.00 %    Monocytes 11.80 0.00 - 13.40 %    Eosinophils 1.40 0.00 - 6.90 %    Basophils 0.50 0.00 - 1.80 %    Immature Granulocytes 0.40 0.00 - 0.90 %    Nucleated RBC 0.00 /100 WBC    Neutrophils (Absolute) 5.70 1.82 - 7.42 K/uL    Lymphs (Absolute) 1.55 1.00 - 4.80 K/uL    Monos (Absolute) 1.00 (H) 0.00 - 0.85 K/uL    Eos (Absolute) 0.12 0.00 - 0.51 K/uL    Baso (Absolute) 0.04 0.00 - 0.12 K/uL    Immature Granulocytes (abs) 0.03 0.00 - 0.11 K/uL    NRBC (Absolute) 0.00 K/uL   Comp Metabolic Panel (CMP)    Collection Time: 12/26/17  5:21 AM   Result Value Ref Range    Sodium 141 135 - 145 mmol/L    Potassium 3.8 3.6 - 5.5 mmol/L    Chloride 104 96 - 112 mmol/L    Co2 30 20 - 33 mmol/L    Anion Gap 7.0 0.0 - 11.9    Glucose 90 65 - 99 mg/dL    Bun 11 8 - 22 mg/dL    Creatinine 0.63 0.50 - 1.40 mg/dL    Calcium 9.5 8.5 - 10.5 mg/dL    AST(SGOT) 14 12 - 45 U/L    ALT(SGPT) 6 2 - 50 U/L    Alkaline Phosphatase 42 30 - 99 U/L    Total Bilirubin 0.8 0.1 - 1.5 mg/dL    Albumin 3.1 (L) 3.2 - 4.9 g/dL    Total Protein 5.8 (L) 6.0 - 8.2 g/dL    Globulin 2.7 1.9 - 3.5 g/dL    A-G Ratio 1.1 g/dL   Vitamin D, 25-hydroxy (blood)    Collection Time: 12/26/17  5:21 AM   Result Value Ref Range    25-Hydroxy   Vitamin D 25 29 (L) 30 - 100 ng/mL   ESTIMATED GFR    Collection Time: 12/26/17  5:21 AM   Result Value Ref Range    GFR If African American >60 >60 mL/min/1.73 m 2    GFR If Non African American >60 >60 mL/min/1.73 m 2       Current Facility-Administered Medications   Medication Frequency   • Respiratory Care per Protocol Continuous RT   • Pharmacy Consult Request ...Pain Management Review 1 Each PRN   • oxycodone immediate-release (ROXICODONE) tablet 5 mg Q3HRS PRN   • tramadol (ULTRAM) 50 MG tablet 50 mg Q4HRS PRN   • artificial tears 1.4 % ophthalmic solution 1 Drop PRN   • hydrALAZINE (APRESOLINE) tablet 25 mg Q8HRS PRN   • mag hydrox-al  hydrox-simeth (MAALOX PLUS ES or MYLANTA DS) suspension 20 mL Q2HRS PRN   • trazodone (DESYREL) tablet 50 mg QHS PRN   • sodium chloride (OCEAN) 0.65 % nasal spray 2 Spray PRN   • calcium carbonate (TUMS) chewable tab 500 mg BID   • cyclobenzaprine (FLEXERIL) tablet 10 mg Q8HRS PRN   • docusate sodium (COLACE) capsule 100 mg BID   • ondansetron (ZOFRAN ODT) dispertab 4 mg Q4HRS PRN   • polyethylene glycol/lytes (MIRALAX) PACKET 1 Packet BID PRN   • senna-docusate (PERICOLACE or SENOKOT S) 8.6-50 MG per tablet 1 Tab Nightly   • vitamin D (cholecalciferol) tablet 5,000 Units DAILY   • acetaminophen (TYLENOL) tablet 500 mg Q6HRS   • bisoprolol (ZEBETA) tablet 5 mg DAILY   • cephALEXin (KEFLEX) capsule 500 mg Q6HRS   • enoxaparin (LOVENOX) inj 30 mg Q12HRS   • finasteride (PROSCAR) tablet 5 mg DAILY   • fluticasone (FLONASE) nasal spray 100 mcg DAILY       Orders Placed This Encounter   Procedures   • Diet Order     Standing Status:   Standing     Number of Occurrences:   1     Order Specific Question:   Diet:     Answer:   Regular [1]       Assessment:  Active Hospital Problems    Diagnosis   • Hypertension   • Cervical stenosis of spine   • S/P cervical spinal fusion     This patient is a 77 y.o. male admitted for acute inpatient rehabilitation with cervical myelopathy (nontraumatic), from severe cervical spinal stenosis s/p urgent C3-C5 laminectomy with C3-C4 posterior instrumented fusion by Dr. Forbes.     Medical Decision Making and Plan:    Cervical myelopathy/non-traumatic - with left arm monoparesis. Continue full rehab program. Unclear if on Keflex for incision? Vs UTI. Incision slightly red on admit photo. Monitor. Continue collar at all times. Spinal precautions.    Pain management - scheduled tylenol. PRN tramadol.    UTI/dysuria/incontinence - continue Keflex until culture returns.    Hypertension - continue bisoprolol. Has some tachycardia. Monitor.    BPH - continue finasteride. Check PVRs.    Decreased  protein/albumin - dietician consult.    Low Vit D - 29. Continue supplementation.    Bladder - incontinent - likely from UTI. Schedule tolieting.    Bowel - meds as needed. Last BM 12/25/2017.    DVT prophylaxis - Lovenox.    Total time:  >35 minutes.  I spent greater than 50% of the time for patient care and coordination on this date, including unit/floor time, and face-to-face time with the patient as per assessment and plan above.    Samina Edwards M.D.

## 2017-12-26 NOTE — PROGRESS NOTES
Patient is AOx4 uses the call light when assistance is needed and has not attempted to self-transfer this shift. Patient denies complaints of pain states his pain is controlled so far with scheduled tylenol. Patient has been up participating in therapies.

## 2017-12-26 NOTE — CARE PLAN
Problem: Safety  Goal: Will remain free from falls    Intervention: Implement fall precautions  Use of call light encouraged to make needs known.Side rails up x 2, bed in low position, non skid socks/shoes on when out of bed.Alarms in place for safety.Call light and things within reach.Will continue to monitor and assess needs and safety.      Problem: Bowel/Gastric:  Goal: Normal bowel function is maintained or improved    Intervention: Educate patient and significant other/support system about signs and symptoms of constipation and interventions to implement  Pt refused scheduled bowel medication.Continent of bowel per report.LBM 12/25.Will continue to monitor and assess for s/sx of constipation.      Problem: Pain Management  Goal: Pain level will decrease to patient's comfort goal    Intervention: Follow pain managment plan developed in collaboration with patient and Interdisciplinary Team  Pain is manageable with scheduled pain medication at this time.Repositioned with pillows for comfort.Will continue to monitor and assess pain level and medicate as needed.

## 2017-12-26 NOTE — CARE PLAN
Problem: Urinary Elimination:  Goal: Ability to reestablish a normal urinary elimination pattern will improve    Intervention: Encourage scheduled voiding  Patient had a bladder accident this am, states he could not wait for staff. Brief placed on patient, clothes changed, urinal positioned closer to patient in w/c. UA performed yesterday and culture in progress (see chart review, labs). Patient is on keflex, will continue to monitor.

## 2017-12-27 PROCEDURE — 94760 N-INVAS EAR/PLS OXIMETRY 1: CPT

## 2017-12-27 PROCEDURE — A9270 NON-COVERED ITEM OR SERVICE: HCPCS | Performed by: PHYSICAL MEDICINE & REHABILITATION

## 2017-12-27 PROCEDURE — 97110 THERAPEUTIC EXERCISES: CPT

## 2017-12-27 PROCEDURE — 99232 SBSQ HOSP IP/OBS MODERATE 35: CPT | Performed by: PHYSICAL MEDICINE & REHABILITATION

## 2017-12-27 PROCEDURE — 97112 NEUROMUSCULAR REEDUCATION: CPT

## 2017-12-27 PROCEDURE — 700111 HCHG RX REV CODE 636 W/ 250 OVERRIDE (IP): Performed by: PHYSICAL MEDICINE & REHABILITATION

## 2017-12-27 PROCEDURE — 97530 THERAPEUTIC ACTIVITIES: CPT

## 2017-12-27 PROCEDURE — 700102 HCHG RX REV CODE 250 W/ 637 OVERRIDE(OP): Performed by: PHYSICAL MEDICINE & REHABILITATION

## 2017-12-27 PROCEDURE — 700112 HCHG RX REV CODE 229: Performed by: PHYSICAL MEDICINE & REHABILITATION

## 2017-12-27 PROCEDURE — 97116 GAIT TRAINING THERAPY: CPT

## 2017-12-27 PROCEDURE — 770010 HCHG ROOM/CARE - REHAB SEMI PRIVAT*

## 2017-12-27 RX ADMIN — DOCUSATE SODIUM 100 MG: 100 CAPSULE, LIQUID FILLED ORAL at 08:16

## 2017-12-27 RX ADMIN — ENOXAPARIN SODIUM 30 MG: 100 INJECTION SUBCUTANEOUS at 08:18

## 2017-12-27 RX ADMIN — ENOXAPARIN SODIUM 30 MG: 100 INJECTION SUBCUTANEOUS at 20:18

## 2017-12-27 RX ADMIN — CALCIUM CARBONATE (ANTACID) CHEW TAB 500 MG 500 MG: 500 CHEW TAB at 08:14

## 2017-12-27 RX ADMIN — BISOPROLOL FUMARATE 5 MG: 5 TABLET, COATED ORAL at 08:15

## 2017-12-27 RX ADMIN — ACETAMINOPHEN 500 MG: 500 TABLET ORAL at 17:44

## 2017-12-27 RX ADMIN — ACETAMINOPHEN 500 MG: 500 TABLET ORAL at 05:10

## 2017-12-27 RX ADMIN — CHOLECALCIFEROL TAB 25 MCG (1000 UNIT) 5000 UNITS: 25 TAB at 08:15

## 2017-12-27 RX ADMIN — FLUTICASONE PROPIONATE 100 MCG: 50 SPRAY, METERED NASAL at 08:17

## 2017-12-27 RX ADMIN — CALCIUM CARBONATE (ANTACID) CHEW TAB 500 MG 500 MG: 500 CHEW TAB at 20:18

## 2017-12-27 RX ADMIN — CEPHALEXIN 500 MG: 250 CAPSULE ORAL at 05:10

## 2017-12-27 RX ADMIN — TRAMADOL HYDROCHLORIDE 50 MG: 50 TABLET, FILM COATED ORAL at 08:16

## 2017-12-27 RX ADMIN — FINASTERIDE 5 MG: 5 TABLET, FILM COATED ORAL at 08:15

## 2017-12-27 RX ADMIN — OXYCODONE HYDROCHLORIDE 5 MG: 5 TABLET ORAL at 22:00

## 2017-12-27 RX ADMIN — DOCUSATE SODIUM AND SENNOSIDES 1 TABLET: 8.6; 5 TABLET, FILM COATED ORAL at 20:19

## 2017-12-27 RX ADMIN — DOCUSATE SODIUM 100 MG: 100 CAPSULE, LIQUID FILLED ORAL at 20:18

## 2017-12-27 RX ADMIN — CEPHALEXIN 500 MG: 250 CAPSULE ORAL at 11:55

## 2017-12-27 RX ADMIN — ACETAMINOPHEN 500 MG: 500 TABLET ORAL at 11:55

## 2017-12-27 RX ADMIN — CEPHALEXIN 500 MG: 250 CAPSULE ORAL at 17:44

## 2017-12-27 ASSESSMENT — PAIN SCALES - GENERAL
PAINLEVEL_OUTOF10: 7
PAINLEVEL_OUTOF10: 0
PAINLEVEL_OUTOF10: 6
PAINLEVEL_OUTOF10: 0

## 2017-12-27 ASSESSMENT — GAIT ASSESSMENTS: DEVIATION: ATAXIC;STEP TO;DECREASED BASE OF SUPPORT;BRADYKINETIC;SHUFFLED GAIT;DECREASED HEEL STRIKE;DECREASED TOE OFF

## 2017-12-27 NOTE — REHAB-DIETARY IDT TEAM NOTE
"Dietary   Nutrition       Dietary Problems (Active)            There are no active problems.        Nutrition Care/ Consult For Weight Loss PTA    Assessment:    Admitting Diagnosis: Cervical stenosis of spine, S/P cervical spinal fusion, and Hypertension.    Pertinent PMH: PBH, HTN, and hyperlipidemia    Additional Information: The pt was seen today in his room, just returning from therapy and about to enjoy ice cream. The pt was noted to have wt loss PTA. The pt reports 20-30# wt loss over the previous 3 months which is planned and positive as the pt wants to 'firm up a bit'. When asked about what he eats at home, the pt says that he normally has small meals with 3 Boosts per day. PO intake has been ~50% of recent meals with 20% average today. The pt states that the portion sizes he is receiving on meal trays is too large for him - he requests smaller portions and is amendable to adding Boost Plus TID.     Appetite: \"good\"  Diet: Regular  Average PO intake x 3 days: ~50%  Labs: Albumin 3.1 (L)  Medications: Tums, Keflex, Colace, Lovenox, Pericolace, Vitamin D,   PRN Medications: Hydralazine, Zofran, Roxicodone, Miralax, Tramadol  IVF: None noted in MAR    Height: 5, 10\" (1.778 m)  Weight: 166# (75.6kg)  IBW: 166# (75.5kg)  BMI: 23.91    Skin: No skin breakdown noted, only surgical incision (neck)  GI: last BM on 12/26  : WNL  Vitals: /75, on 1L O2 by NC  I/Os: Reviewed.    Diagnosis: No nutritional diagnosis at this time.    Intervention/ Recommendations/POC:  1. Continue current diet, add Boost Plus TID to mimic home regimen, smaller portion sizes on meal trays per pt request.  2. Encourage adequate PO/fluid intake.  3. Nutrition rep to see regarding food prefs/ honor within dietary restrictions (if indicated)     Monitor/Evaluation: Monitor PO intake, weight, labs, medication adjustments, skin integrity, GI function, vitals, I/Os, and overall hydration status.  Adjust nutritional POC pending clinical " outcomes.   RD following PRN.  Goal: Maintain adequate oral nutrient/fluid intake to promote nutrition optimization/healing.     Section completed by:  Yas Hair R.D.

## 2017-12-27 NOTE — REHAB-OT IDT TEAM NOTE
Occupational Therapy   Activities of Daily Living  Eating Initial:  7 - Independent  Eating Current:  4 - Minimal Assistance   Eating Description:  Supervision for safety, Increased time  Grooming Initial:  4 - Minimal Assistance  Grooming Current:  4 - Minimal Assistance   Grooming Description:   (pt completed 4/5 steps seated at sink with min A with shaving for thoroughness, min A with managing toothpaste due to BUE weakness/numbness)  Bathing Initial:  3 - Moderate Assistance  Bathing Current:  3 - Moderate Assistance   Bathing Description:  Grab bar, Tub bench, Hand held shower, Increased time, Supervision for safety, Verbal cueing, Set-up of equipment (pt able to wash/dry 6/10 parts with mod A for feet and bilateral LE's. CGA while standing for amy area and buttocks.)  Upper Body Dressing Initial:  4 - Minimal Assistance  Upper Body Dressing Current:  4 - Minimal Assistance   Upper Body Dressing Description:  Increased time, Supervision for safety, Verbal cueing, Application of orthotic or brace, Set-up of equipment (don/doff pullover shirt with min A for pulling down in back, total A to don C-collar, vc's for spinal precautions)  Lower Body Dressing Initial:  2 - Max Assistance  Lower Body Dressing Current:  3 - Moderate Assistance   Lower Body Dressing Description:  3 - Moderate Assistance  Toileting Initial:  1 - Total Assistance  Toileting Current:  3 - Moderate Assistance   Toileting Description:  Grab bar, Increased time, Supervision for safety, Verbal cueing, Set-up of equipment  Toilet Transfer Initial:  4 - Minimal Assistance  Toilet Transfer Current:  4 - Minimal Assistance   Toilet Transfer Description:  4 - Minimal Assistance  Tub / Shower Transfer Initial:  4 - Minimal Assistance  Tub / Shower Transfer Current:  4 - Minimal Assistance   Tub / Shower Transfer Description:  Grab bar, Increased time, Supervision for safety, Verbal cueing, Set-up of equipment, Initial preparation for task (w/c<>tub  bench with grab bars, CGA and vc's for sequencing)  IADL:  To be addressed.    Family Training/Education:  Long handled AE for ADLs, FM coordination activities, spinal precautions, c-collar management.   DME/DC Recommendations:  To be determined.      Strengths:  Able to follow instructions, Alert and oriented, Effective communication skills, Independent PLOF, Making steady progress towards goals, Manages pain appropriately, Motivated for self care and independence, Pleasant and cooperative, Supportive family and Willingly participates in therapeutic activities  Barriers:  Decreased endurance, Poor activity tolerance, Poor balance and Other: decreased sensation, bilateral hand weakness/numbness     # of short term goals set= 4    # of short term goals met= 0 (new admit)          Occupational Therapy Goals           Problem: Bathing     Dates: Start: 12/26/17       Goal: STG-Within one week, patient will bathe     Dates: Start: 12/26/17       Description: 1) Individualized Goal:  Min A and use of AE/AD/Compensatory strategies as needed.   2) Interventions:  OT Self Care/ADL, OT Community Reintegration, OT Neuro Re-Ed/Balance, OT Sensory Int Techniques, OT Therapeutic Activity, OT Evaluation and OT Therapeutic Exercise       Note:     Goal Note filed on 12/27/17 1528 by Dana Brar OT    Goal: STG-Within one week, patient will bathe  Outcome: PROGRESSING AS EXPECTED  Mod A                   Problem: Dressing     Dates: Start: 12/26/17       Goal: STG-Within one week, patient will dress LB     Dates: Start: 12/26/17       Description: 1) Individualized Goal:  Min A, min vc's for spinal precautions, and use of AE/AD/Compensatory strategies as needed.   2) Interventions:  OT Self Care/ADL, OT Community Reintegration, OT Neuro Re-Ed/Balance, OT Sensory Int Techniques, OT Therapeutic Activity, OT Evaluation and OT Therapeutic Exercise       Note:     Goal Note filed on 12/27/17 1528 by Dana Brar OT    Goal:  STG-Within one week, patient will dress LB  Outcome: PROGRESSING AS EXPECTED  Mod A with AE                  Problem: Functional Transfers     Dates: Start: 12/26/17       Goal: STG-Within one week, patient will     Dates: Start: 12/26/17       Description: 1) Individualized Goal:  Spv/set up and use of least restrictive DME.   2) Interventions:  OT Self Care/ADL, OT Community Reintegration, OT Neuro Re-Ed/Balance, OT Sensory Int Techniques, OT Therapeutic Activity, OT Evaluation and OT Therapeutic Exercise       Note:     Goal Note filed on 12/27/17 1528 by Dana Brar OT    Goal: STG-Within one week, patient will  Outcome: PROGRESSING AS EXPECTED  Min A                   Problem: Grooming     Dates: Start: 12/26/17       Goal: STG-Within one week, patient will complete grooming     Dates: Start: 12/26/17       Description: 1) Individualized Goal:  Spv/set up and use of AE/AD/Compensatory strategies as needed.   2) Interventions:  OT Self Care/ADL, OT Community Reintegration, OT Neuro Re-Ed/Balance, OT Sensory Int Techniques, OT Therapeutic Activity, OT Evaluation and OT Therapeutic Exercise       Note:     Goal Note filed on 12/27/17 1528 by Dana Brar OT    Goal: STG-Within one week, patient will complete grooming  Outcome: PROGRESSING AS EXPECTED  Min A                   Problem: OT Long Term Goals     Dates: Start: 12/26/17       Goal: LTG-By discharge, patient will complete basic self care tasks     Dates: Start: 12/26/17       Description: 1) Individualized Goal:  Mod I and use of AE/AD/Compensatory strategies as needed.   2) Interventions:  OT Self Care/ADL, OT Community Reintegration, OT Neuro Re-Ed/Balance, OT Sensory Int Techniques, OT Therapeutic Activity, OT Evaluation and OT Therapeutic Exercise             Goal: LTG-By discharge, patient will perform bathroom transfers     Dates: Start: 12/26/17       Description: 1) Individualized Goal:  Mod I with least restrictive AE/DME.   2)  Interventions:  OT Self Care/ADL, OT Community Reintegration, OT Neuro Re-Ed/Balance, OT Sensory Int Techniques, OT Therapeutic Activity, OT Evaluation and OT Therapeutic Exercise                   Section completed by:  Dana Brar OT

## 2017-12-27 NOTE — CARE PLAN
Problem: Respiratory:  Goal: Respiratory status will improve  Lungs sounds clear.  None productive cough.

## 2017-12-27 NOTE — DIETARY
"Nutrition Care/ Consult For Weight Loss PTA    Assessment:    Admitting Diagnosis: Cervical stenosis of spine, S/P cervical spinal fusion, and Hypertension.    Pertinent PMH: PBH, HTN, and hyperlipidemia    Additional Information: The pt was seen today in his room, just returning from therapy and about to enjoy ice cream. The pt was noted to have wt loss PTA. The pt reports 20-30# wt loss over the previous 3 months which is planned and positive as the pt wants to 'firm up a bit'. When asked about what he eats at home, the pt says that he normally has small meals with 3 Boosts per day. PO intake has been ~50% of recent meals with 20% average today. The pt states that the portion sizes he is receiving on meal trays is too large for him - he requests smaller portions and is amendable to adding Boost Plus TID.     Appetite: \"good\"  Diet: Regular  Average PO intake x 3 days: ~50%  Labs: Albumin 3.1 (L)  Medications: Tums, Keflex, Colace, Lovenox, Pericolace, Vitamin D,   PRN Medications: Hydralazine, Zofran, Roxicodone, Miralax, Tramadol  IVF: None noted in MAR    Height: 5, 10\" (1.778 m)  Weight: 166# (75.6kg)  IBW: 166# (75.5kg)  BMI: 23.91    Skin: No skin breakdown noted, only surgical incision (neck)  GI: last BM on 12/26  : WNL  Vitals: /75, on 1L O2 by NC  I/Os: Reviewed.    Diagnosis: No nutritional diagnosis at this time.    Intervention/ Recommendations/POC:  1. Continue current diet, add Boost Plus TID to mimic home regimen, smaller portion sizes on meal trays per pt request.  2. Encourage adequate PO/fluid intake.  3. Nutrition rep to see regarding food prefs/ honor within dietary restrictions (if indicated)     Monitor/Evaluation: Monitor PO intake, weight, labs, medication adjustments, skin integrity, GI function, vitals, I/Os, and overall hydration status.  Adjust nutritional POC pending clinical outcomes.   RD following PRN.  Goal: Maintain adequate oral nutrient/fluid intake to promote nutrition " optimization/healing.

## 2017-12-27 NOTE — CARE PLAN
Problem: Grooming  Goal: STG-Within one week, patient will complete grooming  1) Individualized Goal:  Spv/set up and use of AE/AD/Compensatory strategies as needed.   2) Interventions:  OT Self Care/ADL, OT Community Reintegration, OT Neuro Re-Ed/Balance, OT Sensory Int Techniques, OT Therapeutic Activity, OT Evaluation and OT Therapeutic Exercise     Outcome: PROGRESSING AS EXPECTED  Min A     Problem: Bathing  Goal: STG-Within one week, patient will bathe  1) Individualized Goal:  Min A and use of AE/AD/Compensatory strategies as needed.   2) Interventions:  OT Self Care/ADL, OT Community Reintegration, OT Neuro Re-Ed/Balance, OT Sensory Int Techniques, OT Therapeutic Activity, OT Evaluation and OT Therapeutic Exercise     Outcome: PROGRESSING AS EXPECTED  Mod A     Problem: Dressing  Goal: STG-Within one week, patient will dress LB  1) Individualized Goal:  Min A, min vc's for spinal precautions, and use of AE/AD/Compensatory strategies as needed.   2) Interventions:  OT Self Care/ADL, OT Community Reintegration, OT Neuro Re-Ed/Balance, OT Sensory Int Techniques, OT Therapeutic Activity, OT Evaluation and OT Therapeutic Exercise     Outcome: PROGRESSING AS EXPECTED  Mod A with AE    Problem: Functional Transfers  Goal: STG-Within one week, patient will  1) Individualized Goal:  Spv/set up and use of least restrictive DME.   2) Interventions:  OT Self Care/ADL, OT Community Reintegration, OT Neuro Re-Ed/Balance, OT Sensory Int Techniques, OT Therapeutic Activity, OT Evaluation and OT Therapeutic Exercise     Outcome: PROGRESSING AS EXPECTED  Min A

## 2017-12-27 NOTE — THERAPY
"Physical Therapy Initial Plan of Care Note    1) Assessment: Patient \"Soren\" is 77 y.o. male with a diagnosis of severe spinal stenosis at the C3-C4 level with associated focal T2 cord signal abnormality status-post C3-C5 laminectomy with C3-C4 posterior instrumented fusion.  Additional factors influencing patient status / progress (ie: cognitive factors, co-morbidities, social support, etc): Active and IND PLOF until unable to walk due to spinal stenosis; lives with SO; good motivation, cognition, and strength; poor balance, BLE coordination, and endurance; mild to moderate pain; cervical precautions, c/s collar, fall risk.  Long term and short term goals have been discussed with patient and they are in agreement.  2) Plan: Recommend Physical Therapy  minutes per day 5-7 days per week for 3 weeks for the following treatments:  PT Group Therapy, PT E Stim Attended, PT Gait Training, PT Therapeutic Exercises, PT Neuro Re-Ed/Balance, PT Therapeutic Activity and PT Manual Therapy.  3) Goals:  Please refer to care plan for goals.   "

## 2017-12-27 NOTE — CARE PLAN
Problem: Safety  Goal: Will remain free from falls    Intervention: Implement fall precautions  Appropriately uses call light to make needs known.Bed in low position.No impulsive activity noted.Call light and things within reach.Will continue to monitor and assess needs and safety.      Problem: Pain Management  Goal: Pain level will decrease to patient's comfort goal    Intervention: Follow pain managment plan developed in collaboration with patient and Interdisciplinary Team  Medicated per request for pain with good effect.Repositioned with pillows for comfort.Will continue to monitor and assess pain level and medicate as needed.      Problem: Urinary Elimination:  Goal: Ability to reestablish a normal urinary elimination pattern will improve    Intervention: Assist patient to sit on commode or toilet for voiding  Assisted to the bathroom, continent of bladder.Denies any discomfort or flank pain.Will continue to monitor and assess.

## 2017-12-27 NOTE — PROGRESS NOTES
"Rehab Progress Note     Date of Service: 12/27/2017   Chief complaint: bladder accident, follow up cervical myelopathy    Interval Events (Subjective)    Patient seen and examined. He was seen in his room as well as with physical therapy today He slept well last night. He denies any pain this am.  No new complaints. Notes of the therapist appreciated    Objective:  VITAL SIGNS: /73   Pulse 64   Temp 36.7 °C (98.1 °F)   Resp 18   Ht 1.778 m (5' 10\")   Wt 75.6 kg (166 lb 10.7 oz)   SpO2 93%   BMI 23.91 kg/m²     Gen: alert, no apparent distress  HEENT: cervical collar in place  CV: regular rate and rhythm, no murmurs, mild 1+ peripheral edema at shins  Resp: clear to ascultation bilaterally, normal respiratory effort  GI: soft, non-tender abdomen, bowel sounds present  Neuro: notable for left arm monoparesis      Recent Results (from the past 72 hour(s))   URINE CULTURE-EXISTING-LESS THAN 48 HOURS    Collection Time: 12/25/17 10:35 AM   Result Value Ref Range    Significant Indicator NEG     Source UR     Site URINE, CLEAN CATCH     Urine Culture No growth at 24 hours.    URINALYSIS    Collection Time: 12/25/17  6:00 PM   Result Value Ref Range    Color Yellow     Character Clear     Specific Gravity 1.016 <1.035    Ph 6.0 5.0 - 8.0    Glucose Negative Negative mg/dL    Ketones Negative Negative mg/dL    Protein Negative Negative mg/dL    Bilirubin Negative Negative    Urobilinogen, Urine 0.2 Negative    Nitrite Negative Negative    Leukocyte Esterase Trace (A) Negative    Occult Blood Negative Negative    Micro Urine Req Microscopic    URINE MICROSCOPIC (W/UA)    Collection Time: 12/25/17  6:00 PM   Result Value Ref Range    WBC 0-2 (A) /hpf    RBC 0-2 (A) /hpf    Bacteria Negative None /hpf    Epithelial Cells Negative /hpf    Ca Oxalate Crystal Few /hpf    Hyaline Cast 0-2 /lpf   CBC with Differential    Collection Time: 12/26/17  5:21 AM   Result Value Ref Range    WBC 8.4 4.8 - 10.8 K/uL    RBC 4.39 " (L) 4.70 - 6.10 M/uL    Hemoglobin 14.7 14.0 - 18.0 g/dL    Hematocrit 42.5 42.0 - 52.0 %    MCV 96.8 81.4 - 97.8 fL    MCH 33.5 (H) 27.0 - 33.0 pg    MCHC 34.6 33.7 - 35.3 g/dL    RDW 46.1 35.9 - 50.0 fL    Platelet Count 194 164 - 446 K/uL    MPV 9.9 9.0 - 12.9 fL    Neutrophils-Polys 67.50 44.00 - 72.00 %    Lymphocytes 18.40 (L) 22.00 - 41.00 %    Monocytes 11.80 0.00 - 13.40 %    Eosinophils 1.40 0.00 - 6.90 %    Basophils 0.50 0.00 - 1.80 %    Immature Granulocytes 0.40 0.00 - 0.90 %    Nucleated RBC 0.00 /100 WBC    Neutrophils (Absolute) 5.70 1.82 - 7.42 K/uL    Lymphs (Absolute) 1.55 1.00 - 4.80 K/uL    Monos (Absolute) 1.00 (H) 0.00 - 0.85 K/uL    Eos (Absolute) 0.12 0.00 - 0.51 K/uL    Baso (Absolute) 0.04 0.00 - 0.12 K/uL    Immature Granulocytes (abs) 0.03 0.00 - 0.11 K/uL    NRBC (Absolute) 0.00 K/uL   Comp Metabolic Panel (CMP)    Collection Time: 12/26/17  5:21 AM   Result Value Ref Range    Sodium 141 135 - 145 mmol/L    Potassium 3.8 3.6 - 5.5 mmol/L    Chloride 104 96 - 112 mmol/L    Co2 30 20 - 33 mmol/L    Anion Gap 7.0 0.0 - 11.9    Glucose 90 65 - 99 mg/dL    Bun 11 8 - 22 mg/dL    Creatinine 0.63 0.50 - 1.40 mg/dL    Calcium 9.5 8.5 - 10.5 mg/dL    AST(SGOT) 14 12 - 45 U/L    ALT(SGPT) 6 2 - 50 U/L    Alkaline Phosphatase 42 30 - 99 U/L    Total Bilirubin 0.8 0.1 - 1.5 mg/dL    Albumin 3.1 (L) 3.2 - 4.9 g/dL    Total Protein 5.8 (L) 6.0 - 8.2 g/dL    Globulin 2.7 1.9 - 3.5 g/dL    A-G Ratio 1.1 g/dL   HEMOGLOBIN A1C    Collection Time: 12/26/17  5:21 AM   Result Value Ref Range    Glycohemoglobin 5.4 0.0 - 5.6 %    Est Avg Glucose 108 mg/dL   Vitamin D, 25-hydroxy (blood)    Collection Time: 12/26/17  5:21 AM   Result Value Ref Range    25-Hydroxy   Vitamin D 25 29 (L) 30 - 100 ng/mL   ESTIMATED GFR    Collection Time: 12/26/17  5:21 AM   Result Value Ref Range    GFR If African American >60 >60 mL/min/1.73 m 2    GFR If Non African American >60 >60 mL/min/1.73 m 2       Current  Facility-Administered Medications   Medication Frequency   • Respiratory Care per Protocol Continuous RT   • Pharmacy Consult Request ...Pain Management Review 1 Each PRN   • oxycodone immediate-release (ROXICODONE) tablet 5 mg Q3HRS PRN   • tramadol (ULTRAM) 50 MG tablet 50 mg Q4HRS PRN   • artificial tears 1.4 % ophthalmic solution 1 Drop PRN   • hydrALAZINE (APRESOLINE) tablet 25 mg Q8HRS PRN   • mag hydrox-al hydrox-simeth (MAALOX PLUS ES or MYLANTA DS) suspension 20 mL Q2HRS PRN   • trazodone (DESYREL) tablet 50 mg QHS PRN   • sodium chloride (OCEAN) 0.65 % nasal spray 2 Spray PRN   • calcium carbonate (TUMS) chewable tab 500 mg BID   • cyclobenzaprine (FLEXERIL) tablet 10 mg Q8HRS PRN   • docusate sodium (COLACE) capsule 100 mg BID   • ondansetron (ZOFRAN ODT) dispertab 4 mg Q4HRS PRN   • polyethylene glycol/lytes (MIRALAX) PACKET 1 Packet BID PRN   • senna-docusate (PERICOLACE or SENOKOT S) 8.6-50 MG per tablet 1 Tab Nightly   • vitamin D (cholecalciferol) tablet 5,000 Units DAILY   • acetaminophen (TYLENOL) tablet 500 mg Q6HRS   • bisoprolol (ZEBETA) tablet 5 mg DAILY   • cephALEXin (KEFLEX) capsule 500 mg Q6HRS   • enoxaparin (LOVENOX) inj 30 mg Q12HRS   • finasteride (PROSCAR) tablet 5 mg DAILY   • fluticasone (FLONASE) nasal spray 100 mcg DAILY       Orders Placed This Encounter   Procedures   • Diet Order     Standing Status:   Standing     Number of Occurrences:   1     Order Specific Question:   Diet:     Answer:   Regular [1]       Assessment:  Active Hospital Problems    Diagnosis   • Hypertension   • Cervical stenosis of spine   • S/P cervical spinal fusion     This patient is a 77 y.o. male admitted for acute inpatient rehabilitation with cervical myelopathy (nontraumatic), from severe cervical spinal stenosis s/p urgent C3-C5 laminectomy with C3-C4 posterior instrumented fusion by Dr. Forbes.     Medical Decision Making and Plan:    Cervical myelopathy/non-traumatic - with left arm monoparesis.  Continue full rehab program. Unclear if on Keflex for incision? Vs UTI. Incision slightly red on admit photo. Monitor. Continue collar at all times. Spinal precautions.    Pain management - scheduled tylenol. PRN tramadol.    UTI/dysuria/incontinence - continue Keflex until culture returns.Urine culture shows no growth so far will wait final culture before deciding with his to discontinue antibiotics    Hypertension - continue bisoprolol. Has some tachycardia. Monitor.    BPH - continue finasteride. Check PVRs.    Decreased protein/albumin - dietician consult.    Low Vit D - 29. Continue supplementation.    Bladder - incontinent - likely from UTI. Schedule tolieting.    Bowel - meds as needed. Last BM 12/25/2017.    DVT prophylaxis - Lovenox.    Continue PT and OT  Initial team conference will be tomorrow 12/28/2017    Total time:  >25 minutes.  I spent greater than 50% of the time for patient care and coordination on this date, including unit/floor time, and face-to-face time with the patient as per assessment and plan above.    Ezequiel Clements M.D.

## 2017-12-28 LAB
BACTERIA UR CULT: NORMAL
SIGNIFICANT IND 70042: NORMAL
SITE SITE: NORMAL
SOURCE SOURCE: NORMAL

## 2017-12-28 PROCEDURE — 97530 THERAPEUTIC ACTIVITIES: CPT

## 2017-12-28 PROCEDURE — 97110 THERAPEUTIC EXERCISES: CPT

## 2017-12-28 PROCEDURE — 770010 HCHG ROOM/CARE - REHAB SEMI PRIVAT*

## 2017-12-28 PROCEDURE — A9270 NON-COVERED ITEM OR SERVICE: HCPCS | Performed by: PHYSICAL MEDICINE & REHABILITATION

## 2017-12-28 PROCEDURE — 94760 N-INVAS EAR/PLS OXIMETRY 1: CPT

## 2017-12-28 PROCEDURE — 700111 HCHG RX REV CODE 636 W/ 250 OVERRIDE (IP): Performed by: PHYSICAL MEDICINE & REHABILITATION

## 2017-12-28 PROCEDURE — 97533 SENSORY INTEGRATION: CPT

## 2017-12-28 PROCEDURE — 700112 HCHG RX REV CODE 229: Performed by: PHYSICAL MEDICINE & REHABILITATION

## 2017-12-28 PROCEDURE — 97116 GAIT TRAINING THERAPY: CPT

## 2017-12-28 PROCEDURE — 700102 HCHG RX REV CODE 250 W/ 637 OVERRIDE(OP): Performed by: PHYSICAL MEDICINE & REHABILITATION

## 2017-12-28 PROCEDURE — 99233 SBSQ HOSP IP/OBS HIGH 50: CPT | Performed by: PHYSICAL MEDICINE & REHABILITATION

## 2017-12-28 RX ADMIN — CEPHALEXIN 500 MG: 250 CAPSULE ORAL at 17:48

## 2017-12-28 RX ADMIN — OXYCODONE HYDROCHLORIDE 5 MG: 5 TABLET ORAL at 11:37

## 2017-12-28 RX ADMIN — CEPHALEXIN 500 MG: 250 CAPSULE ORAL at 00:15

## 2017-12-28 RX ADMIN — OXYCODONE HYDROCHLORIDE 5 MG: 5 TABLET ORAL at 07:28

## 2017-12-28 RX ADMIN — CALCIUM CARBONATE (ANTACID) CHEW TAB 500 MG 500 MG: 500 CHEW TAB at 21:13

## 2017-12-28 RX ADMIN — TRAZODONE HYDROCHLORIDE 50 MG: 50 TABLET ORAL at 22:19

## 2017-12-28 RX ADMIN — DOCUSATE SODIUM 100 MG: 100 CAPSULE, LIQUID FILLED ORAL at 08:31

## 2017-12-28 RX ADMIN — CEPHALEXIN 500 MG: 250 CAPSULE ORAL at 05:35

## 2017-12-28 RX ADMIN — FINASTERIDE 5 MG: 5 TABLET, FILM COATED ORAL at 08:31

## 2017-12-28 RX ADMIN — DOCUSATE SODIUM AND SENNOSIDES 1 TABLET: 8.6; 5 TABLET, FILM COATED ORAL at 21:13

## 2017-12-28 RX ADMIN — CHOLECALCIFEROL TAB 25 MCG (1000 UNIT) 5000 UNITS: 25 TAB at 08:30

## 2017-12-28 RX ADMIN — BISOPROLOL FUMARATE 5 MG: 5 TABLET, COATED ORAL at 08:31

## 2017-12-28 RX ADMIN — OXYCODONE HYDROCHLORIDE 5 MG: 5 TABLET ORAL at 17:48

## 2017-12-28 RX ADMIN — CALCIUM CARBONATE (ANTACID) CHEW TAB 500 MG 500 MG: 500 CHEW TAB at 08:31

## 2017-12-28 RX ADMIN — ENOXAPARIN SODIUM 30 MG: 100 INJECTION SUBCUTANEOUS at 21:12

## 2017-12-28 RX ADMIN — POLYETHYLENE GLYCOL 3350 1 PACKET: 17 POWDER, FOR SOLUTION ORAL at 21:12

## 2017-12-28 RX ADMIN — DOCUSATE SODIUM 100 MG: 100 CAPSULE, LIQUID FILLED ORAL at 21:13

## 2017-12-28 RX ADMIN — CEPHALEXIN 500 MG: 250 CAPSULE ORAL at 11:35

## 2017-12-28 RX ADMIN — ENOXAPARIN SODIUM 30 MG: 100 INJECTION SUBCUTANEOUS at 08:32

## 2017-12-28 ASSESSMENT — PAIN SCALES - GENERAL
PAINLEVEL_OUTOF10: 0
PAINLEVEL_OUTOF10: 5
PAINLEVEL_OUTOF10: 2
PAINLEVEL_OUTOF10: 2
PAINLEVEL_OUTOF10: 5

## 2017-12-28 NOTE — REHAB-NURSING IDT TEAM NOTE
Nursing   Nursing  Diet/Nutrition:  Regular and Thin Liquids  Medication Administration:  Whole with Liquid Wash  % consumed at meals in last 24 hours:  Consumed 20-80% of meals per documentation.  Breakfast:  20%   Lunch:  20%  Dinner:  80%   Snack schedule:  None  Supplement:  Other: Per RD  Appetite:  Fair  Fluid Intake/Output in past 24 hours: In: 720 [P.O.:720]  Out: -   Admit Weight:  Weight: 75.6 kg (166 lb 10.7 oz)  Weight Last 7 Days   [75.6 kg (166 lb 10.7 oz)] 75.6 kg (166 lb 10.7 oz) (12/25 1450)    Pain Issues:    Location:  Neck (12/28 0015)  Posterior (12/28 0015)         Severity:  Moderate   Scheduled pain medications:  None     PRN pain medications used in last 24 hours:  oxycodone immediate release (ROXICODONE)  and tramadol (Ultram)   Non Pharmacologic Interventions:  warm blanket, relaxation, repositioned and rest  Effectiveness of pain management plan:  good=patient states acceptable comfort after interventions    Bowel:    Bowel Assist Initial Score:  3 - Moderate Assistance  Bowel Assist Current Score:  3 - Moderate Assistance  Bowl Accidents in last 7 days:     Last bowel movement: 12/26/17  Stool: Medium, Loose     Usual bowel pattern:  daily  Scheduled bowel medications:  docusate sodium (COLACE) and senna-docusate (PERICOLACE or SENOKOT S)   PRN bowel medications used in last 24 hours:  None  Nursing Interventions:  Increased time, Scheduled medication, Supervision, Positioning on commode/toilet, Brief  Effectiveness of bowel program:   good=regular, predictable, controlled emptying of bowel  Bladder:    Bladder Assist Initial Score:  3 - Moderate Assistance  Bladder Assist Current Score:  3 - Moderate Assistance  Bladder Accidents in last 7 days:  1  PVR range for past 24-48 hours:  [14-86]  ()  Medications affecting bladder:  Proscar    Time void schedule/voiding pattern:  Time void every 3 hours during the day and every 4 hours at night  Interventions:  Increased time, Supervision,  Emptying of device, Urinal, Brief  Effectiveness of bladder training:  Fair=no incontinence but requires cueing to keep time void schedule      Wound:         Patient Lines/Drains/Airways Status    Active Current Wounds     Name: Placement date: Placement time: Site: Days:    Surgical Incision  Incision N/A Neck 12/22/17 1921      5                   Interventions:  Monitor & Assess  Effectiveness of intervention:  wound is improving     Wound Vac Location:  Not applicable  Dressing last changed:  Not applicable  Pump Mode Pressure Setting       Description of drainage:  Not applicable    Sleep/wake cycle:   Average hours slept:  Sleeps 4-6 hours without waking  Sleep medication usage:  Declines    Patient/Family Training/Education:  Fall Prevention, Pain Management, Skin Care and Other:  Cervical Spinal Precautions  Discharge Supply Recommendations:  Blood Pressure Monitor and Other: Cervical Collar  Strengths: Alert and oriented, Willingly participates in therapeutic activities, Able to follow instructions, Pleasant and cooperative, Effective communication skills, Motivated for self care and independence and Manages pain appropriately   Barriers:   Hypertension and Poor appetite            Nursing Problems           Problem: Bowel/Gastric:     Goal: Normal bowel function is maintained or improved           Goal: Will not experience complications related to bowel motility             Problem: Communication     Goal: The ability to communicate needs accurately and effectively will improve             Problem: Discharge Barriers/Planning     Goal: Patient's continuum of care needs will be met             Problem: Infection     Goal: Will remain free from infection             Problem: Knowledge Deficit     Goal: Knowledge of disease process/condition, treatment plan, diagnostic tests, and medications will improve           Goal: Knowledge of the prescribed therapeutic regimen will improve             Problem:  Knowledge Deficit     Goal: Patient/Significant other demonstrates understanding of disease process, treatment plan, medications and discharge instructions             Problem: Mobility     Goal: Risk for activity intolerance will decrease             Problem: NUTRITION     Goal: Adequate Food and Fluid Intake           Goal: Patient maintains adequate hydration           Goal: Patient maintains weight           Goal: Patient/Family demonstrates understanding of diet           Goal: Patient will utilize adaptive techniques to administer nutrition           Goal: Patient will verbalize dietary restrictions             Problem: Pain Management     Goal: Pain level will decrease to patient's comfort goal     Flowsheet:     Taken at 12/27/17 0815    Nurse Pain Scale 0 - 10  6 by Olga Garcia, L.P.N.    Comfort Goal Comfort at Rest;Comfort with Movement;Sleep Comfortably by JOHANNA Leslie.P.N.    Non Verbal Scale  Calm by JOHANNA Leslie.P.N.                  Problem: Problem: Pain     Goal: LTG - Patient will manage pain with the appropriate technique/Intervention           Goal: STG - Patient will reduce or eliminate use of analgesics           Goal: STG - pain is manageable through therapies           Goal: STG - Patient will verbalize an acceptable level of pain           Goal: STG - patients pain is managed to allow active participation in daily activities           Goal: STG - Patient will increase activity level             Problem: Psychosocial needs     Goal: Coping Skills for Life Altering Diagnosis           Goal: Spiritual needs incorporated in hospitalization           Goal: Cultural needs incorporated in hospitalization           Goal: Anxiety reduction             Problem: Respiratory:     Goal: Respiratory status will improve             Problem: SAFETY     Goal: LTG - PATIENT WILL ADHERE TO APPROPRIATE PRECAUTIONS DURING ADL'S AND TRANSFERS     Description: Type of Precautions in  use:  Hip  Back  Spinal  Cardiac  Seizure  Aspiration  Other           Goal: Free from accidental injury             Problem: SKIN INTEGRITY     Goal: LTG - Patient will be free from infection           Goal: LTG - PATIENT WILL MAINTAIN/IMPROVE SKIN INTEGRITY THROUGH PROPER SKIN CARE TECHNIQUES.           Goal: LTG - Patient will demonstrate appropriate pressure relief techniques           Goal: STG - Patient exhibits signs of wound healing.           Goal: STG - patient demonstrates pressure reduction techniques             Problem: Safety     Goal: Will remain free from injury           Goal: Will remain free from falls             Problem: Self Care Deficit     Goal: Ability to feed and maintain oral hygiene independently or with assistance           Goal: Ability to toilet independently or with assistance             Problem: Urinary Elimination:     Goal: Ability to reestablish a normal urinary elimination pattern will improve             Problem: Venous Thromboembolism (VTW)/Deep Vein Thrombosis (DVT) Prevention:     Goal: Patient will participate in Venous Thrombosis (VTE)/Deep Vein Thrombosis (DVT)Prevention Measures                  Long Term Goals:   At discharge patient will be able to function safely at home and in the community with support.    Section completed by:  Paola Whiting R.N.

## 2017-12-28 NOTE — CARE PLAN
Problem: Pain Management  Goal: Pain level will decrease to patient's comfort goal  Outcome: PROGRESSING AS EXPECTED  Pt taking PRN Lola 5 mg for pain. Pt able to participate in therapies and activities this shift.    Problem: SAFETY  Goal: LTG - PATIENT WILL ADHERE TO APPROPRIATE PRECAUTIONS DURING ADL'S AND TRANSFERS  Type of Precautions in use:  Hip  Back  Spinal  Cardiac  Seizure  Aspiration  Other   Outcome: PROGRESSING AS EXPECTED  Patient demonstrates good safety technique this shift.  Asks for assistance when needed and does not attempt self transfer.  Able to verbalize needs.

## 2017-12-28 NOTE — CARE PLAN
Problem: Safety  Goal: Will remain free from injury  Outcome: PROGRESSING AS EXPECTED  Patient demonstrates good safety technique this shift.  Asks for assistance when needed and does not attempt self transfer.  Pt also shows strength in safety awareness by abiding by his cervical precautions; pt maintains aspen collar at all times, and does not bend or twist cervical area.     Problem: Pain Management  Goal: Pain level will decrease to patient's comfort goal  Outcome: PROGRESSING AS EXPECTED  Pt able to verbalize pain level and requests appropriate interventions (medication). Pt has received PRN pain medication once this shift. Pt reports manageable and acceptable pain control.

## 2017-12-28 NOTE — REHAB-RESPIRATORY IDT TEAM NOTE
Respiratory Therapy   Respiratory Therapy    Pt is using 2 lpm 02 to maintain SATS >90%.  He has refused smoking cessation and he states he has quit before on his own.    Section completed by:  Bekah Adrian, RRT

## 2017-12-28 NOTE — CARE PLAN
Problem: Communication  Goal: The ability to communicate needs accurately and effectively will improve  Patient alert and oriented x 4,    Problem: Pain Management  Goal: Pain level will decrease to patient's comfort goal   12/27/17 0815   OTHER   Nurse Pain Scale 0 - 10  6   Non Verbal Scale  Calm   Comfort Goal Comfort at Rest;Comfort with Movement;Sleep Comfortably       0815 Patient complained of back pain.  Tramadol 1 tab given per prn order.   0930 no further complaint of pain.

## 2017-12-28 NOTE — REHAB-PT IDT TEAM NOTE
"Physical Therapy   Mobility  Bed mobility:   SBA  Bed /Chair/Wheelchair Transfer Initial:  3 - Moderate Assistance  Bed /Chair/Wheelchair Transfer Current:  4 - Minimal Assistance   Bed/Chair/Wheelchair Transfer Description:   (FWW, c/s collar already donned, oxygen, gait belt, increased time, setup, and cueing)  Walk Initial:  1 - Total Assistance  Walk Current:  2 - Max Assistance   Walk Description:   (Min A at gait belt, used FWW and c/s collar, setup for O2, mod cueing, increased time, setup; 2x 115 feet indoors)  Wheelchair Initial:  4 - Minimal Assistance  Wheelchair Current:  4 - Minimal Assistance   Wheelchair Description:   (Min A with BLE propulsion, indoors for 170 feet, increased time, cueing for path finding, setup for O2, c/s collar; w/c swapped out to non-reclining w/c at 21\" height)  Stairs Initial:  0 - Not tested,unsafe activity  Stairs Current: 2 - Max Assistance   Stairs Description:  (2 sets using Min A for 6 adaptive/4\" stairs each; variable stepping pattern, gait belt, B railings, oxygen management, increased time, setup, and cueing.)  Patient/Family Training/Education:  Initiated patient education; family training not anticipated  DME/DC Recommendations:  2 weeks; FWW likely, home health PT likely, intermittent SPV from [facility] likely  Strengths:  Able to follow instructions, Adequate strength, Alert and oriented, Independent PLOF, Making steady progress towards goals, Motivated for self care and independence, Pleasant and cooperative and Willingly participates in therapeutic activities  Barriers:   Decreased endurance, Generalized weakness, Limited mobility, Poor balance and Other: currently using supplemental oxygen  # of short term goals set= 4 set yesterday after PT eval  # of short term goals met= 0 since yesterday       Physical Therapy Problems           Problem: Mobility     Dates: Start: 12/26/17       Goal: STG-Within one week, patient will ambulate community distances     Dates: Start: " 12/26/17       Description: 1) Individualized goal:  200 feet with SBA, c/s collar, and FWW  2) Interventions:  PT Group Therapy, PT E Stim Attended, PT Gait Training, PT Therapeutic Exercises, PT Neuro Re-Ed/Balance, PT Therapeutic Activity and PT Manual Therapy.       Note:     Goal Note filed on 12/27/17 1738 by Alessandro Waddell, PT    Goal: STG-Within one week, patient will ambulate community distances  Outcome: NOT MET  Patient is limited by endurance and balance at this time.  Goals were set   yesterday following PT eval.                  Problem: Mobility Transfers     Dates: Start: 12/26/17       Goal: STG-Within one week, patient will perform bed mobility     Dates: Start: 12/26/17       Description: 1) Individualized goal:  with SPV and c/s collar  2) Interventions:  PT Group Therapy, PT E Stim Attended, PT Gait Training, PT Therapeutic Exercises, PT Neuro Re-Ed/Balance, PT Therapeutic Activity and PT Manual Therapy.       Note:     Goal Note filed on 12/27/17 1738 by Alessandro Waddell, PT    Goal: STG-Within one week, patient will perform bed mobility  Outcome: NOT MET  Patient is limited by strength and technique at this time.  Goals were set   yesterday following PT eval.                Goal: STG-Within one week, patient will sit to stand     Dates: Start: 12/26/17       Description: 1) Individualized goal:  with SBA, c/s collar, and FWW  2) Interventions:  PT Group Therapy, PT E Stim Attended, PT Gait Training, PT Therapeutic Exercises, PT Neuro Re-Ed/Balance, PT Therapeutic Activity and PT Manual Therapy.       Note:     Goal Note filed on 12/27/17 1738 by Alessandro Waddell, PT    Goal: STG-Within one week, patient will sit to stand  Outcome: NOT MET  Patient is limited by technique and balance at this time.  Goals were set   yesterday following PT eval.                Goal: STG-Within one week, patient will transfer in/out of car     Dates: Start: 12/26/17       Description: 1)  Individualized goal:  with SBA, c/s collar, and FWW  2) Interventions:  PT Group Therapy, PT E Stim Attended, PT Gait Training, PT Therapeutic Exercises, PT Neuro Re-Ed/Balance, PT Therapeutic Activity and PT Manual Therapy.         Note:     Goal Note filed on 12/27/17 1738 by Alessandro Waddell, PT    Goal: STG-Within one week, patient will transfer in/out of car  Outcome: NOT MET  Patient is limited by technique and balance at this time.  Goals were set   yesterday following PT eval.                  Problem: PT-Long Term Goals     Dates: Start: 12/26/17       Goal: LTG-By discharge, patient will ambulate     Dates: Start: 12/26/17       Description: 1) Individualized goal:  500 feet with Mod I, c/s collar, and FWW  2) Interventions:  PT Group Therapy, PT E Stim Attended, PT Gait Training, PT Therapeutic Exercises, PT Neuro Re-Ed/Balance, PT Therapeutic Activity and PT Manual Therapy.             Goal: LTG-By discharge, patient will transfer one surface to another     Dates: Start: 12/26/17       Description: 1) Individualized goal:  with Mod I, c/s collar, and FWW  2) Interventions: PT Group Therapy, PT E Stim Attended, PT Gait Training, PT Therapeutic Exercises, PT Neuro Re-Ed/Balance, PT Therapeutic Activity and PT Manual Therapy.             Goal: LTG-By discharge, patient will ambulate up/down 4-6 stairs     Dates: Start: 12/26/17       Description: 1) Individualized goal:  4 standard stairs with SPV, c/s collar, and FWW  2) Interventions: PT Group Therapy, PT E Stim Attended, PT Gait Training, PT Therapeutic Exercises, PT Neuro Re-Ed/Balance, PT Therapeutic Activity and PT Manual Therapy.                     Section completed by:  Alessandro Waddell, PT, DPT

## 2017-12-28 NOTE — REHAB-COLLABORATIVE ONGOING IDT TEAM NOTE
Weekly Interdisciplinary Team Conference Note    Weekly Interdisciplinary Team Conference # 1  Date:  12/28/2017    Clinicians present and reporting at team conference include the following:   MD: Ezequiel Clements MD   RN:  Paige Samaniego RN   PT:   Vanna Mendoza, PT, MSPT  OT:  Dana Brar, MS OTR/L   ST:  Not Applicable  CM:  Fany Martin, LSW, CCM  REC:  Not Applicable  RT:  Not Applicable  RPh:  Denis Wong, [facility]  Other:   Not Applicable  All reporting clinicians have a working knowledge of this patient's plan of care.  Recommendation:  Update IPOC to address therapy service delivery:  PT 60  Min/day and    min/day, for at least 15 hours per week.  Targeted DC Date:  1/11/2018.     Medical    Patient Active Problem List    Diagnosis Date Noted   • Cervical cord myelomalacia (CMS-HCC) 12/21/2017     Priority: High   • Hypertension 12/25/2017   • Cervical stenosis of spine 12/25/2017   • S/P cervical spinal fusion 12/25/2017   • Smoking history 12/21/2017     Results     Procedure Component Value Ref Range Date/Time    URINE CULTURE-EXISTING-LESS THAN 48 HOURS [[id number]] Collected:  12/25/17 1035    Order Status:  Completed Lab Status:  Final result Updated:  12/28/17 0822    Specimen:  Urine from Urine, Clean Catch      Significant Indicator NEG     Source UR     Site URINE, CLEAN CATCH     Urine Culture No growth at 48 hours    Narrative:       Collected By:[id number] ZACHERY OVIEDO  Indication for culture:->Dysuria/Frequency/Burning           Nursing  Diet/Nutrition:  Regular and Thin Liquids  Medication Administration:  Whole with Liquid Wash  % consumed at meals in last 24 hours:  Consumed 20-80% of meals per documentation.  Breakfast:  20%   Lunch:  20%  Dinner:  80%   Snack schedule:  None  Supplement:  Other: Per RD  Appetite:  Fair  Fluid Intake/Output in past 24 hours: In: 720 [P.O.:720]  Out: -   Admit Weight:  Weight: 75.6 kg (166 lb 10.7 oz)  Weight Last 7 Days   [75.6 kg (166 lb 10.7 oz)]  75.6 kg (166 lb 10.7 oz) (12/25 1450)    Pain Issues:    Location:  Neck (12/28 0015)  Posterior (12/28 0015)         Severity:  Moderate   Scheduled pain medications:  None     PRN pain medications used in last 24 hours:  oxycodone immediate release (ROXICODONE)  and tramadol (Ultram)   Non Pharmacologic Interventions:  warm blanket, relaxation, repositioned and rest  Effectiveness of pain management plan:  good=patient states acceptable comfort after interventions    Bowel:    Bowel Assist Initial Score:  3 - Moderate Assistance  Bowel Assist Current Score:  3 - Moderate Assistance  Bowl Accidents in last 7 days:     Last bowel movement: 12/26/17  Stool: Medium, Loose     Usual bowel pattern:  daily  Scheduled bowel medications:  docusate sodium (COLACE) and senna-docusate (PERICOLACE or SENOKOT S)   PRN bowel medications used in last 24 hours:  None  Nursing Interventions:  Increased time, Scheduled medication, Supervision, Positioning on commode/toilet, Brief  Effectiveness of bowel program:   good=regular, predictable, controlled emptying of bowel  Bladder:    Bladder Assist Initial Score:  3 - Moderate Assistance  Bladder Assist Current Score:  3 - Moderate Assistance  Bladder Accidents in last 7 days:  1  PVR range for past 24-48 hours:  [14-86]  ()  Medications affecting bladder:  Proscar    Time void schedule/voiding pattern:  Time void every 3 hours during the day and every 4 hours at night  Interventions:  Increased time, Supervision, Emptying of device, Urinal, Brief  Effectiveness of bladder training:  Fair=no incontinence but requires cueing to keep time void schedule      Wound:         Patient Lines/Drains/Airways Status    Active Current Wounds     Name: Placement date: Placement time: Site: Days:    Surgical Incision  Incision N/A Neck 12/22/17 1921      5                   Interventions:  Monitor & Assess  Effectiveness of intervention:  wound is improving     Wound Vac Location:  Not  applicable  Dressing last changed:  Not applicable  Pump Mode Pressure Setting       Description of drainage:  Not applicable    Sleep/wake cycle:   Average hours slept:  Sleeps 4-6 hours without waking  Sleep medication usage:  Declines    Patient/Family Training/Education:  Fall Prevention, Pain Management, Skin Care and Other:  Cervical Spinal Precautions  Discharge Supply Recommendations:  Blood Pressure Monitor and Other: Cervical Collar  Strengths: Alert and oriented, Willingly participates in therapeutic activities, Able to follow instructions, Pleasant and cooperative, Effective communication skills, Motivated for self care and independence and Manages pain appropriately   Barriers:   Hypertension and Poor appetite            Nursing Problems           Problem: Bowel/Gastric:     Goal: Normal bowel function is maintained or improved           Goal: Will not experience complications related to bowel motility             Problem: Communication     Goal: The ability to communicate needs accurately and effectively will improve             Problem: Discharge Barriers/Planning     Goal: Patient's continuum of care needs will be met             Problem: Infection     Goal: Will remain free from infection             Problem: Knowledge Deficit     Goal: Knowledge of disease process/condition, treatment plan, diagnostic tests, and medications will improve           Goal: Knowledge of the prescribed therapeutic regimen will improve             Problem: Knowledge Deficit     Goal: Patient/Significant other demonstrates understanding of disease process, treatment plan, medications and discharge instructions             Problem: Mobility     Goal: Risk for activity intolerance will decrease             Problem: NUTRITION     Goal: Adequate Food and Fluid Intake           Goal: Patient maintains adequate hydration           Goal: Patient maintains weight           Goal: Patient/Family demonstrates understanding of diet            Goal: Patient will utilize adaptive techniques to administer nutrition           Goal: Patient will verbalize dietary restrictions             Problem: Pain Management     Goal: Pain level will decrease to patient's comfort goal     Flowsheet:     Taken at 12/27/17 0815    Nurse Pain Scale 0 - 10  6 by Olga Garcia, L.P.N.    Comfort Goal Comfort at Rest;Comfort with Movement;Sleep Comfortably by JOHANNA Leslie.P.N.    Non Verbal Scale  Calm by FAM LeslieP.N.                  Problem: Problem: Pain     Goal: LTG - Patient will manage pain with the appropriate technique/Intervention           Goal: STG - Patient will reduce or eliminate use of analgesics           Goal: STG - pain is manageable through therapies           Goal: STG - Patient will verbalize an acceptable level of pain           Goal: STG - patients pain is managed to allow active participation in daily activities           Goal: STG - Patient will increase activity level             Problem: Psychosocial needs     Goal: Coping Skills for Life Altering Diagnosis           Goal: Spiritual needs incorporated in hospitalization           Goal: Cultural needs incorporated in hospitalization           Goal: Anxiety reduction             Problem: Respiratory:     Goal: Respiratory status will improve             Problem: SAFETY     Goal: LTG - PATIENT WILL ADHERE TO APPROPRIATE PRECAUTIONS DURING ADL'S AND TRANSFERS     Description: Type of Precautions in use:  Hip  Back  Spinal  Cardiac  Seizure  Aspiration  Other           Goal: Free from accidental injury             Problem: SKIN INTEGRITY     Goal: LTG - Patient will be free from infection           Goal: LTG - PATIENT WILL MAINTAIN/IMPROVE SKIN INTEGRITY THROUGH PROPER SKIN CARE TECHNIQUES.           Goal: LTG - Patient will demonstrate appropriate pressure relief techniques           Goal: STG - Patient exhibits signs of wound healing.           Goal: STG - patient  "demonstrates pressure reduction techniques             Problem: Safety     Goal: Will remain free from injury           Goal: Will remain free from falls             Problem: Self Care Deficit     Goal: Ability to feed and maintain oral hygiene independently or with assistance           Goal: Ability to toilet independently or with assistance             Problem: Urinary Elimination:     Goal: Ability to reestablish a normal urinary elimination pattern will improve             Problem: Venous Thromboembolism (VTW)/Deep Vein Thrombosis (DVT) Prevention:     Goal: Patient will participate in Venous Thrombosis (VTE)/Deep Vein Thrombosis (DVT)Prevention Measures                  Long Term Goals:   At discharge patient will be able to function safely at home and in the community with support.    Section completed by:  Paola Whiting R.N.           Respiratory Therapy    Pt is using 2 lpm 02 to maintain SATS >90%.  He has refused smoking cessation and he states he has quit before on his own.    Section completed by:  Bekah Adrian, RRT       Mobility  Bed mobility:   SBA  Bed /Chair/Wheelchair Transfer Initial:  3 - Moderate Assistance  Bed /Chair/Wheelchair Transfer Current:  4 - Minimal Assistance   Bed/Chair/Wheelchair Transfer Description:   (FWW, c/s collar already donned, oxygen, gait belt, increased time, setup, and cueing)  Walk Initial:  1 - Total Assistance  Walk Current:  2 - Max Assistance   Walk Description:   (Min A at gait belt, used FWW and c/s collar, setup for O2, mod cueing, increased time, setup; 2x 115 feet indoors)  Wheelchair Initial:  4 - Minimal Assistance  Wheelchair Current:  4 - Minimal Assistance   Wheelchair Description:   (Min A with BLE propulsion, indoors for 170 feet, increased time, cueing for path finding, setup for O2, c/s collar; w/c swapped out to non-reclining w/c at 21\" height)  Stairs Initial:  0 - Not tested,unsafe activity  Stairs Current: 2 - Max " "Assistance   Stairs Description:  (2 sets using Min A for 6 adaptive/4\" stairs each; variable stepping pattern, gait belt, B railings, oxygen management, increased time, setup, and cueing.)  Patient/Family Training/Education:  Initiated patient education; family training not anticipated  DME/DC Recommendations:  2 weeks; FWW likely, home health PT likely, intermittent SPV from [facility] likely  Strengths:  Able to follow instructions, Adequate strength, Alert and oriented, Independent PLOF, Making steady progress towards goals, Motivated for self care and independence, Pleasant and cooperative and Willingly participates in therapeutic activities  Barriers:   Decreased endurance, Generalized weakness, Limited mobility, Poor balance and Other: currently using supplemental oxygen  # of short term goals set= 4 set yesterday after PT eval  # of short term goals met= 0 since yesterday       Physical Therapy Problems           Problem: Mobility     Dates: Start: 12/26/17       Goal: STG-Within one week, patient will ambulate community distances     Dates: Start: 12/26/17       Description: 1) Individualized goal:  200 feet with SBA, c/s collar, and FWW  2) Interventions:  PT Group Therapy, PT E Stim Attended, PT Gait Training, PT Therapeutic Exercises, PT Neuro Re-Ed/Balance, PT Therapeutic Activity and PT Manual Therapy.       Note:     Goal Note filed on 12/27/17 1738 by Alessandro Waddell, PT    Goal: STG-Within one week, patient will ambulate community distances  Outcome: NOT MET  Patient is limited by endurance and balance at this time.  Goals were set   yesterday following PT eval.                  Problem: Mobility Transfers     Dates: Start: 12/26/17       Goal: STG-Within one week, patient will perform bed mobility     Dates: Start: 12/26/17       Description: 1) Individualized goal:  with SPV and c/s collar  2) Interventions:  PT Group Therapy, PT E Stim Attended, PT Gait Training, PT Therapeutic Exercises, PT Neuro " Re-Ed/Balance, PT Therapeutic Activity and PT Manual Therapy.       Note:     Goal Note filed on 12/27/17 1738 by Alessandro Waddell, PT    Goal: STG-Within one week, patient will perform bed mobility  Outcome: NOT MET  Patient is limited by strength and technique at this time.  Goals were set   yesterday following PT eval.                Goal: STG-Within one week, patient will sit to stand     Dates: Start: 12/26/17       Description: 1) Individualized goal:  with SBA, c/s collar, and FWW  2) Interventions:  PT Group Therapy, PT E Stim Attended, PT Gait Training, PT Therapeutic Exercises, PT Neuro Re-Ed/Balance, PT Therapeutic Activity and PT Manual Therapy.       Note:     Goal Note filed on 12/27/17 1738 by Alessandro Waddell, PT    Goal: STG-Within one week, patient will sit to stand  Outcome: NOT MET  Patient is limited by technique and balance at this time.  Goals were set   yesterday following PT eval.                Goal: STG-Within one week, patient will transfer in/out of car     Dates: Start: 12/26/17       Description: 1) Individualized goal:  with SBA, c/s collar, and FWW  2) Interventions:  PT Group Therapy, PT E Stim Attended, PT Gait Training, PT Therapeutic Exercises, PT Neuro Re-Ed/Balance, PT Therapeutic Activity and PT Manual Therapy.         Note:     Goal Note filed on 12/27/17 1738 by Alessandro Waddell, PT    Goal: STG-Within one week, patient will transfer in/out of car  Outcome: NOT MET  Patient is limited by technique and balance at this time.  Goals were set   yesterday following PT eval.                  Problem: PT-Long Term Goals     Dates: Start: 12/26/17       Goal: LTG-By discharge, patient will ambulate     Dates: Start: 12/26/17       Description: 1) Individualized goal:  500 feet with Mod I, c/s collar, and FWW  2) Interventions:  PT Group Therapy, PT E Stim Attended, PT Gait Training, PT Therapeutic Exercises, PT Neuro Re-Ed/Balance, PT Therapeutic Activity and PT  Manual Therapy.             Goal: LTG-By discharge, patient will transfer one surface to another     Dates: Start: 12/26/17       Description: 1) Individualized goal:  with Mod I, c/s collar, and FWW  2) Interventions: PT Group Therapy, PT E Stim Attended, PT Gait Training, PT Therapeutic Exercises, PT Neuro Re-Ed/Balance, PT Therapeutic Activity and PT Manual Therapy.             Goal: LTG-By discharge, patient will ambulate up/down 4-6 stairs     Dates: Start: 12/26/17       Description: 1) Individualized goal:  4 standard stairs with SPV, c/s collar, and FWW  2) Interventions: PT Group Therapy, PT E Stim Attended, PT Gait Training, PT Therapeutic Exercises, PT Neuro Re-Ed/Balance, PT Therapeutic Activity and PT Manual Therapy.                     Section completed by:  Alessandro Waddell, PT, DPT       Activities of Daily Living  Eating Initial:  7 - Independent  Eating Current:  4 - Minimal Assistance   Eating Description:  Supervision for safety, Increased time  Grooming Initial:  4 - Minimal Assistance  Grooming Current:  4 - Minimal Assistance   Grooming Description:   (pt completed 4/5 steps seated at sink with min A with shaving for thoroughness, min A with managing toothpaste due to BUE weakness/numbness)  Bathing Initial:  3 - Moderate Assistance  Bathing Current:  3 - Moderate Assistance   Bathing Description:  Grab bar, Tub bench, Hand held shower, Increased time, Supervision for safety, Verbal cueing, Set-up of equipment (pt able to wash/dry 6/10 parts with mod A for feet and bilateral LE's. CGA while standing for amy area and buttocks.)  Upper Body Dressing Initial:  4 - Minimal Assistance  Upper Body Dressing Current:  4 - Minimal Assistance   Upper Body Dressing Description:  Increased time, Supervision for safety, Verbal cueing, Application of orthotic or brace, Set-up of equipment (don/doff pullover shirt with min A for pulling down in back, total A to don C-collar, vc's for spinal  precautions)  Lower Body Dressing Initial:  2 - Max Assistance  Lower Body Dressing Current:  3 - Moderate Assistance   Lower Body Dressing Description:  3 - Moderate Assistance  Toileting Initial:  1 - Total Assistance  Toileting Current:  3 - Moderate Assistance   Toileting Description:  Grab bar, Increased time, Supervision for safety, Verbal cueing, Set-up of equipment  Toilet Transfer Initial:  4 - Minimal Assistance  Toilet Transfer Current:  4 - Minimal Assistance   Toilet Transfer Description:  4 - Minimal Assistance  Tub / Shower Transfer Initial:  4 - Minimal Assistance  Tub / Shower Transfer Current:  4 - Minimal Assistance   Tub / Shower Transfer Description:  Grab bar, Increased time, Supervision for safety, Verbal cueing, Set-up of equipment, Initial preparation for task (w/c<>tub bench with grab bars, CGA and vc's for sequencing)  IADL:  To be addressed.    Family Training/Education:  Long handled AE for ADLs, FM coordination activities, spinal precautions, c-collar management.   DME/DC Recommendations:  To be determined.      Strengths:  Able to follow instructions, Alert and oriented, Effective communication skills, Independent PLOF, Making steady progress towards goals, Manages pain appropriately, Motivated for self care and independence, Pleasant and cooperative, Supportive family and Willingly participates in therapeutic activities  Barriers:  Decreased endurance, Poor activity tolerance, Poor balance and Other: decreased sensation, bilateral hand weakness/numbness     # of short term goals set= 4    # of short term goals met= 0 (new admit)          Occupational Therapy Goals           Problem: Bathing     Dates: Start: 12/26/17       Goal: STG-Within one week, patient will bathe     Dates: Start: 12/26/17       Description: 1) Individualized Goal:  Min A and use of AE/AD/Compensatory strategies as needed.   2) Interventions:  OT Self Care/ADL, OT Community Reintegration, OT Neuro Re-Ed/Balance,  OT Sensory Int Techniques, OT Therapeutic Activity, OT Evaluation and OT Therapeutic Exercise       Note:     Goal Note filed on 12/27/17 1528 by Dana Brar OT    Goal: STG-Within one week, patient will bathe  Outcome: PROGRESSING AS EXPECTED  Mod A                   Problem: Dressing     Dates: Start: 12/26/17       Goal: STG-Within one week, patient will dress LB     Dates: Start: 12/26/17       Description: 1) Individualized Goal:  Min A, min vc's for spinal precautions, and use of AE/AD/Compensatory strategies as needed.   2) Interventions:  OT Self Care/ADL, OT Community Reintegration, OT Neuro Re-Ed/Balance, OT Sensory Int Techniques, OT Therapeutic Activity, OT Evaluation and OT Therapeutic Exercise       Note:     Goal Note filed on 12/27/17 1528 by Dana Brar OT    Goal: STG-Within one week, patient will dress LB  Outcome: PROGRESSING AS EXPECTED  Mod A with AE                  Problem: Functional Transfers     Dates: Start: 12/26/17       Goal: STG-Within one week, patient will     Dates: Start: 12/26/17       Description: 1) Individualized Goal:  Spv/set up and use of least restrictive DME.   2) Interventions:  OT Self Care/ADL, OT Community Reintegration, OT Neuro Re-Ed/Balance, OT Sensory Int Techniques, OT Therapeutic Activity, OT Evaluation and OT Therapeutic Exercise       Note:     Goal Note filed on 12/27/17 1528 by Dana Brar OT    Goal: STG-Within one week, patient will  Outcome: PROGRESSING AS EXPECTED  Min A                   Problem: Grooming     Dates: Start: 12/26/17       Goal: STG-Within one week, patient will complete grooming     Dates: Start: 12/26/17       Description: 1) Individualized Goal:  Spv/set up and use of AE/AD/Compensatory strategies as needed.   2) Interventions:  OT Self Care/ADL, OT Community Reintegration, OT Neuro Re-Ed/Balance, OT Sensory Int Techniques, OT Therapeutic Activity, OT Evaluation and OT Therapeutic Exercise       Note:     Goal Note  filed on 12/27/17 1528 by Dana Brar OT    Goal: STG-Within one week, patient will complete grooming  Outcome: PROGRESSING AS EXPECTED  Min A                   Problem: OT Long Term Goals     Dates: Start: 12/26/17       Goal: LTG-By discharge, patient will complete basic self care tasks     Dates: Start: 12/26/17       Description: 1) Individualized Goal:  Mod I and use of AE/AD/Compensatory strategies as needed.   2) Interventions:  OT Self Care/ADL, OT Community Reintegration, OT Neuro Re-Ed/Balance, OT Sensory Int Techniques, OT Therapeutic Activity, OT Evaluation and OT Therapeutic Exercise             Goal: LTG-By discharge, patient will perform bathroom transfers     Dates: Start: 12/26/17       Description: 1) Individualized Goal:  Mod I with least restrictive AE/DME.   2) Interventions:  OT Self Care/ADL, OT Community Reintegration, OT Neuro Re-Ed/Balance, OT Sensory Int Techniques, OT Therapeutic Activity, OT Evaluation and OT Therapeutic Exercise                   Section completed by:  Dana Brar OT             Nutrition       Dietary Problems (Active)            There are no active problems.        Nutrition Care/ Consult For Weight Loss PTA    Assessment:    Admitting Diagnosis: Cervical stenosis of spine, S/P cervical spinal fusion, and Hypertension.    Pertinent PMH: PBH, HTN, and hyperlipidemia    Additional Information: The pt was seen today in his room, just returning from therapy and about to enjoy ice cream. The pt was noted to have wt loss PTA. The pt reports 20-30# wt loss over the previous 3 months which is planned and positive as the pt wants to 'firm up a bit'. When asked about what he eats at home, the pt says that he normally has small meals with 3 Boosts per day. PO intake has been ~50% of recent meals with 20% average today. The pt states that the portion sizes he is receiving on meal trays is too large for him - he requests smaller portions and is amendable to adding  "Boost Plus TID.     Appetite: \"good\"  Diet: Regular  Average PO intake x 3 days: ~50%  Labs: Albumin 3.1 (L)  Medications: Tums, Keflex, Colace, Lovenox, Pericolace, Vitamin D,   PRN Medications: Hydralazine, Zofran, Roxicodone, Miralax, Tramadol  IVF: None noted in MAR    Height: 5, 10\" (1.778 m)  Weight: 166# (75.6kg)  IBW: 166# (75.5kg)  BMI: 23.91    Skin: No skin breakdown noted, only surgical incision (neck)  GI: last BM on 12/26  : WNL  Vitals: /75, on 1L O2 by NC  I/Os: Reviewed.    Diagnosis: No nutritional diagnosis at this time.    Intervention/ Recommendations/POC:  1. Continue current diet, add Boost Plus TID to mimic home regimen, smaller portion sizes on meal trays per pt request.  2. Encourage adequate PO/fluid intake.  3. Nutrition rep to see regarding food prefs/ honor within dietary restrictions (if indicated)     Monitor/Evaluation: Monitor PO intake, weight, labs, medication adjustments, skin integrity, GI function, vitals, I/Os, and overall hydration status.  Adjust nutritional POC pending clinical outcomes.   RD following PRN.  Goal: Maintain adequate oral nutrient/fluid intake to promote nutrition optimization/healing.     Section completed by:  Yas Hair R.D.     No notes of this type exist for this encounter.            DC Planning  DC destination/dispostion:  Home w/ supportive spouse; live in [place]; [facility] w/ 2 stairs to enter; walk in shower.    Pt has a fww/hand held shower @ home.     Referrals: None at this time.      DC Needs:   Out paitent therapy.  Follow up w/PCP and Dr. Forbes Neurosurgeon.    Barriers to discharge:   Needs assist w/ adl's.      Strengths: Prior level of function, cognitivly intact; supportive spouse, motivated, making gains w/ therapies.     Section completed by:  ihsan Zamudio ccm     Summary:  Anticipate dc date on Jan 11, 2018.  Monitor for home health vs out pt therapy;  Monitor for dme needs.  Increase  minutes and PT 60 minutes.  "     Physician Summary  Ezequiel Clements MD participated and led team conference discussion.

## 2017-12-28 NOTE — FLOWSHEET NOTE
12/28/17 0757   Events/Summary/Plan   Events/Summary/Plan 02 spot check.     Education   Education Yes - Pt. / Family has been Instructed in use of Respiratory Equipment   [employer]cent[employer] Spirom[employer]   Breathing Exercises Yes   Incentive Spirometer Volume 1500 mL   Incentive Spirometer Date Last Changed 12/21/17   Incentive Spirometer Next Change Date (Q 30 Days) 01/21/18   Respiratory WDL   Respiratory (WDL) X   Chest Exam   Respiration 18   Pulse 78   Breath Sounds   RUL Breath Sounds Diminished   RML Breath Sounds Diminished   RLL Breath Sounds Diminished   LATRICIA Breath Sounds Diminished   LLL Breath Sounds Diminished   Secretions   Cough Non Productive   Oximetry   #Pulse Oximetry (Single Determination) Yes   Oxygen   Home O2 Use Prior To Admission? No   Pulse Oximetry 93 %   O2 (LPM) 2   O2 Daily Delivery Respiratory  Silicone Nasal Cannula

## 2017-12-28 NOTE — PROGRESS NOTES
"Rehab Progress Note     Date of Service: 12/28/2017   Chief complaint: bladder accident, follow up cervical myelopathy    Interval Events (Subjective)    Patient seen and examined. He was seen in his room as well as with physical therapy today He slept well last night. He denies any new  pain this am but he still has some pain in his neck.  No new complaints. Notes of the therapist appreciated. He was Discussed at team conference and the results were reviewed with the patient    Patient did have a dry cough he suspects is allergic. Cough is dry and lungs are clear      IDT Team Meeting 12/28/2017    Ezequiel MCKEON M.D., was present and led the interdisciplinary team conference on 12/28/2017.       RN:  Diet Regular diet   % Meal  Appetite 20-80%   Pain [place] mod OXy 5   Sleep Well   Bowel Continent   Bladder Continent   In's & Out's      PT:  Bed Mobility SBA   Transfers SBA   Mobility 200 feet FWW cgA  400 feet WC supervision   Stairs Min Assist with stairs And handrail   Hand numbness    02 tangles limited endurance  OT:  Eating Doing OK takes longer With adaptive equipment    Grooming Min asssit for shaving  VC For precautions    Bathing ModAssist and cueing   UB Dressing MIn   LB Dressing Mod    Toileting MOd   Shower & Transfer MIn to mod showers  Supervision for transfers     SLP:      Resp:      Rec:      CM:  Continues to work on disposition and DME needs.      DC/Disposition:  Tentative discharge date set for 1/11/2018    Will icnrease OT to 120 and PT 60    Objective:  VITAL SIGNS: /80   Pulse 75   Temp 36.7 °C (98.1 °F)   Resp 18   Ht 1.778 m (5' 10\")   Wt 75.6 kg (166 lb 10.7 oz)   SpO2 90%   BMI 23.91 kg/m²     Gen: alert, no apparent distress  HEENT: cervical collar in place  CV: regular rate and rhythm, no murmurs, mild 1+ peripheral edema at shins  Resp: clear to ascultation bilaterally, normal respiratory effort  GI: soft, non-tender abdomen, bowel sounds present  Neuro: No  " change      Recent Results (from the past 72 hour(s))   URINALYSIS    Collection Time: 12/25/17  6:00 PM   Result Value Ref Range    Color Yellow     Character Clear     Specific Gravity 1.016 <1.035    Ph 6.0 5.0 - 8.0    Glucose Negative Negative mg/dL    Ketones Negative Negative mg/dL    Protein Negative Negative mg/dL    Bilirubin Negative Negative    Urobilinogen, Urine 0.2 Negative    Nitrite Negative Negative    Leukocyte Esterase Trace (A) Negative    Occult Blood Negative Negative    Micro Urine Req Microscopic    URINE MICROSCOPIC (W/UA)    Collection Time: 12/25/17  6:00 PM   Result Value Ref Range    WBC 0-2 (A) /hpf    RBC 0-2 (A) /hpf    Bacteria Negative None /hpf    Epithelial Cells Negative /hpf    Ca Oxalate Crystal Few /hpf    Hyaline Cast 0-2 /lpf   CBC with Differential    Collection Time: 12/26/17  5:21 AM   Result Value Ref Range    WBC 8.4 4.8 - 10.8 K/uL    RBC 4.39 (L) 4.70 - 6.10 M/uL    Hemoglobin 14.7 14.0 - 18.0 g/dL    Hematocrit 42.5 42.0 - 52.0 %    MCV 96.8 81.4 - 97.8 fL    MCH 33.5 (H) 27.0 - 33.0 pg    MCHC 34.6 33.7 - 35.3 g/dL    RDW 46.1 35.9 - 50.0 fL    Platelet Count 194 164 - 446 K/uL    MPV 9.9 9.0 - 12.9 fL    Neutrophils-Polys 67.50 44.00 - 72.00 %    Lymphocytes 18.40 (L) 22.00 - 41.00 %    Monocytes 11.80 0.00 - 13.40 %    Eosinophils 1.40 0.00 - 6.90 %    Basophils 0.50 0.00 - 1.80 %    Immature Granulocytes 0.40 0.00 - 0.90 %    Nucleated RBC 0.00 /100 WBC    Neutrophils (Absolute) 5.70 1.82 - 7.42 K/uL    Lymphs (Absolute) 1.55 1.00 - 4.80 K/uL    Monos (Absolute) 1.00 (H) 0.00 - 0.85 K/uL    Eos (Absolute) 0.12 0.00 - 0.51 K/uL    Baso (Absolute) 0.04 0.00 - 0.12 K/uL    Immature Granulocytes (abs) 0.03 0.00 - 0.11 K/uL    NRBC (Absolute) 0.00 K/uL   Comp Metabolic Panel (CMP)    Collection Time: 12/26/17  5:21 AM   Result Value Ref Range    Sodium 141 135 - 145 mmol/L    Potassium 3.8 3.6 - 5.5 mmol/L    Chloride 104 96 - 112 mmol/L    Co2 30 20 - 33 mmol/L     Anion Gap 7.0 0.0 - 11.9    Glucose 90 65 - 99 mg/dL    Bun 11 8 - 22 mg/dL    Creatinine 0.63 0.50 - 1.40 mg/dL    Calcium 9.5 8.5 - 10.5 mg/dL    AST(SGOT) 14 12 - 45 U/L    ALT(SGPT) 6 2 - 50 U/L    Alkaline Phosphatase 42 30 - 99 U/L    Total Bilirubin 0.8 0.1 - 1.5 mg/dL    Albumin 3.1 (L) 3.2 - 4.9 g/dL    Total Protein 5.8 (L) 6.0 - 8.2 g/dL    Globulin 2.7 1.9 - 3.5 g/dL    A-G Ratio 1.1 g/dL   HEMOGLOBIN A1C    Collection Time: 12/26/17  5:21 AM   Result Value Ref Range    Glycohemoglobin 5.4 0.0 - 5.6 %    Est Avg Glucose 108 mg/dL   Vitamin D, 25-hydroxy (blood)    Collection Time: 12/26/17  5:21 AM   Result Value Ref Range    25-Hydroxy   Vitamin D 25 29 (L) 30 - 100 ng/mL   ESTIMATED GFR    Collection Time: 12/26/17  5:21 AM   Result Value Ref Range    GFR If African American >60 >60 mL/min/1.73 m 2    GFR If Non African American >60 >60 mL/min/1.73 m 2       Current Facility-Administered Medications   Medication Frequency   • guaiFENesin (ROBITUSSIN) 100 MG/5ML solution 200 mg Q4HRS PRN   • Respiratory Care per Protocol Continuous RT   • Pharmacy Consult Request ...Pain Management Review 1 Each PRN   • oxycodone immediate-release (ROXICODONE) tablet 5 mg Q3HRS PRN   • tramadol (ULTRAM) 50 MG tablet 50 mg Q4HRS PRN   • artificial tears 1.4 % ophthalmic solution 1 Drop PRN   • hydrALAZINE (APRESOLINE) tablet 25 mg Q8HRS PRN   • mag hydrox-al hydrox-simeth (MAALOX PLUS ES or MYLANTA DS) suspension 20 mL Q2HRS PRN   • trazodone (DESYREL) tablet 50 mg QHS PRN   • sodium chloride (OCEAN) 0.65 % nasal spray 2 Spray PRN   • calcium carbonate (TUMS) chewable tab 500 mg BID   • cyclobenzaprine (FLEXERIL) tablet 10 mg Q8HRS PRN   • docusate sodium (COLACE) capsule 100 mg BID   • ondansetron (ZOFRAN ODT) dispertab 4 mg Q4HRS PRN   • polyethylene glycol/lytes (MIRALAX) PACKET 1 Packet BID PRN   • senna-docusate (PERICOLACE or SENOKOT S) 8.6-50 MG per tablet 1 Tab Nightly   • vitamin D (cholecalciferol) tablet 5,000  Units DAILY   • bisoprolol (ZEBETA) tablet 5 mg DAILY   • cephALEXin (KEFLEX) capsule 500 mg Q6HRS   • enoxaparin (LOVENOX) inj 30 mg Q12HRS   • finasteride (PROSCAR) tablet 5 mg DAILY   • fluticasone (FLONASE) nasal spray 100 mcg DAILY       Orders Placed This Encounter   Procedures   • Diet Order     Standing Status:   Standing     Number of Occurrences:   1     Order Specific Question:   Diet:     Answer:   Regular [1]     Comments:   pt requests smaller portions please       Assessment:  Active Hospital Problems    Diagnosis   • Hypertension   • Cervical stenosis of spine   • S/P cervical spinal fusion     This patient is a 77 y.o. male admitted for acute inpatient rehabilitation with cervical myelopathy (nontraumatic), from severe cervical spinal stenosis s/p urgent C3-C5 laminectomy with C3-C4 posterior instrumented fusion by Dr. Forbes.     Medical Decision Making and Plan:    Cervical myelopathy/non-traumatic - with left arm monoparesis. Continue full rehab program. Incision looks better Spinal precautions.    Pain management - scheduled tylenol. PRN Narcotics    UTI/dysuria/incontinence - continue Keflex until culture returns.Urine culture shows no growth so farNo no growth ×48 hours    Hypertension - continue bisoprolol. Has some tachycardia. Monitor.    BPH - continue finasteride. Check PVRs.    Decreased protein/albumin - dietician consult.    Low Vit D - 29. Continue supplementation.    Bladder - incontinent - likely from UTI. Schedule tolieting.    Bowel - meds as needed. Last BM 12/25/2017.    DVT prophylaxis - Lovenox.    Continue PT and OT  Patient is doing very well  Tentative discharge date is 1/11/2018    Team  Conference         Nursing    Diet/Nutrition:  Regular and Thin Liquids    Medication Administration:  Whole with Liquid Wash    % consumed at meals in last 24 hours:  Consumed 20-80% of meals per documentation.    Breakfast:  20%     Lunch:  20%    Dinner:  80%     Snack schedule:   None    Supplement:  Other: Per RD    Appetite:  Fair    Fluid Intake/Output in past 24 hours: In: 720 [P.O.:720]    Out: -     Admit Weight:  Weight: 75.6 kg (166 lb 10.7 oz)    Weight Last 7 Days     [75.6 kg (166 lb 10.7 oz)] 75.6 kg (166 lb 10.7 oz) (12/25 1450)         Pain Issues:      Location:  Neck (12/28 0015)    Posterior (12/28 0015)           Severity:  Moderate     Scheduled pain medications:  None       PRN pain medications used in last 24 hours:  oxycodone immediate release (ROXICODONE)  and tramadol (Ultram)     Non Pharmacologic Interventions:  warm blanket, relaxation, repositioned and rest    Effectiveness of pain management plan:  good=patient states acceptable comfort after interventions         Bowel:      Bowel Assist Initial Score:  3 - Moderate Assistance    Bowel Assist Current Score:  3 - Moderate Assistance    Bowl Accidents in last 7 days:       Last bowel movement: 12/26/17    Stool: Medium, Loose       Usual bowel pattern:  daily    Scheduled bowel medications:  docusate sodium (COLACE) and senna-docusate (PERICOLACE or SENOKOT S)     PRN bowel medications used in last 24 hours:  None    Nursing Interventions:  Increased time, Scheduled medication, Supervision, Positioning on commode/toilet, Brief    Effectiveness of bowel program:   good=regular, predictable, controlled emptying of bowel    Bladder:      Bladder Assist Initial Score:  3 - Moderate Assistance    Bladder Assist Current Score:  3 - Moderate Assistance    Bladder Accidents in last 7 days:  1    PVR range for past 24-48 hours:  [14-86]  ()    Medications affecting bladder:  Proscar      Time void schedule/voiding pattern:  Time void every 3 hours during the day and every 4 hours at night    Interventions:  Increased time, Supervision, Emptying of device, Urinal, Brief    Effectiveness of bladder training:  Fair=no incontinence but requires cueing to keep time void schedule              Wound:                 Patient  Lines/Drains/Airways Status                       Active Current Wounds                           Name:     Placement date:     Placement time:     Site:     Days:             Surgical Incision  Incision N/A Neck     12/22/17       1921              5                                           Interventions:  Monitor & Assess    Effectiveness of intervention:  wound is improving          Wound Vac Location:  Not applicable    Dressing last changed:  Not applicable    Pump Mode Pressure Setting         Description of drainage:  Not applicable         Sleep/wake cycle:     Average hours slept:  Sleeps 4-6 hours without waking    Sleep medication usage:  Declines         Patient/Family Training/Education:  Fall Prevention, Pain Management, Skin Care and Other:  Cervical Spinal Precautions    Discharge Supply Recommendations:    Blood Pressure Monitor and Other: Cervical Collar    Strengths: Alert and oriented, Willingly participates in therapeutic activities, Able to follow instructions, Pleasant and cooperative, Effective communication skills, Motivated for self care and independence and Manages pain appropriately     Barriers:   Hypertension and Poor appetite                              Nursing Problems                                     Problem: Bowel/Gastric:                    Goal: Normal bowel function is maintained or improved                                        Goal: Will not experience complications related to bowel motility                                                Problem: Communication                    Goal: The ability to communicate needs accurately and effectively will improve                                                Problem: Discharge Barriers/Planning                    Goal: Patient's continuum of care needs will be met                                                Problem: Infection                    Goal: Will remain free from infection                                                 Problem: Knowledge Deficit                    Goal: Knowledge of disease process/condition, treatment plan, diagnostic tests, and medications will improve                                        Goal: Knowledge of the prescribed therapeutic regimen will improve                                                Problem: Knowledge Deficit                    Goal: Patient/Significant other demonstrates understanding of disease process, treatment plan, medications and discharge instructions                                                Problem: Mobility                    Goal: Risk for activity intolerance will decrease                                                Problem: NUTRITION                    Goal: Adequate Food and Fluid Intake                                        Goal: Patient maintains adequate hydration                                        Goal: Patient maintains weight                                        Goal: Patient/Family demonstrates understanding of diet                                        Goal: Patient will utilize adaptive techniques to administer nutrition                                        Goal: Patient will verbalize dietary restrictions                                                Problem: Pain Management                         Goal: Pain level will decrease to patient's comfort goal                                 Flowsheet:                        Taken at 12/27/17 0815             Nurse Pain Scale 0 - 10      6 by Olga Garcia, L.P.N.             Comfort Goal     Comfort at Rest;Comfort with Movement;Sleep Comfortably by Olga Garcia, L.P.N.             Non Verbal Scale      Calm by Olga Garcai, L.P.N.                                                                   Problem: Problem: Pain                    Goal: LTG - Patient will manage pain with the appropriate technique/Intervention                                        Goal: STG - Patient will  reduce or eliminate use of analgesics                                        Goal: STG - pain is manageable through therapies                                        Goal: STG - Patient will verbalize an acceptable level of pain                                        Goal: STG - patients pain is managed to allow active participation in daily activities                                        Goal: STG - Patient will increase activity level                                                Problem: Psychosocial needs                    Goal: Coping Skills for Life Altering Diagnosis                                        Goal: Spiritual needs incorporated in hospitalization                                        Goal: Cultural needs incorporated in hospitalization                                        Goal: Anxiety reduction                                                Problem: Respiratory:                    Goal: Respiratory status will improve                                                Problem: SAFETY                          Goal: LTG - PATIENT WILL ADHERE TO APPROPRIATE PRECAUTIONS DURING ADL'S AND TRANSFERS                        Description:     Type of Precautions in use:    Hip    Back    Spinal    Cardiac    Seizure    Aspiration    Other                                        Goal: Free from accidental injury                                                Problem: SKIN INTEGRITY                    Goal: LTG - Patient will be free from infection                                        Goal: LTG - PATIENT WILL MAINTAIN/IMPROVE SKIN INTEGRITY THROUGH PROPER SKIN CARE TECHNIQUES.                                        Goal: LTG - Patient will demonstrate appropriate pressure relief techniques                                        Goal: STG - Patient exhibits signs of wound healing.                                        Goal: STG - patient demonstrates pressure reduction techniques                                                 Problem: Safety                    Goal: Will remain free from injury                                        Goal: Will remain free from falls                                                Problem: Self Care Deficit                    Goal: Ability to feed and maintain oral hygiene independently or with assistance                                        Goal: Ability to toilet independently or with assistance                                                Problem: Urinary Elimination:                    Goal: Ability to reestablish a normal urinary elimination pattern will improve                                                Problem: Venous Thromboembolism (VTW)/Deep Vein Thrombosis (DVT) Prevention:                    Goal: Patient will participate in Venous Thrombosis (VTE)/Deep Vein Thrombosis (DVT)Prevention Measures                                                Long Term Goals:   At discharge patient will be able to function safely at home and in the community with support.         Section completed by:  Paola Whiting R.N.                  Respiratory Therapy         Pt is using 2 lpm 02 to maintain SATS >90%.  He has refused smoking cessation and he states he has quit before on his own.         Section completed by:  Bekah Adrian, RRT              Mobility    Bed mobility:   SBA    Bed /Chair/Wheelchair Transfer Initial:  3 - Moderate Assistance    Bed /Chair/Wheelchair Transfer Current:  4 - Minimal Assistance               Bed/Chair/Wheelchair Transfer Description:   (FWW, c/s collar already donned, oxygen, gait belt, increased time, setup, and cueing)    Walk Initial:  1 - Total Assistance    Walk Current:  2 - Max Assistance               Walk Description:   (Min A at gait belt, used FWW and c/s collar, setup for O2, mod cueing, increased time, setup; 2x 115 feet indoors)    Wheelchair Initial:  4 - Minimal Assistance    Wheelchair Current:  4 - Minimal  "Assistance               Wheelchair Description:   (Min A with BLE propulsion, indoors for 170 feet, increased time, cueing for path finding, setup for O2, c/s collar; w/c swapped out to non-reclining w/c at 21\" height)    Stairs Initial:  0 - Not tested,unsafe activity    Stairs Current: 2 - Max Assistance               Stairs Description:  (2 sets using Min A for 6 adaptive/4\" stairs each; variable stepping pattern, gait belt, B railings, oxygen management, increased time, setup, and cueing.)    Patient/Family Training/Education:  Initiated patient education; family training not anticipated    DME/DC Recommendations:  2 weeks; FWW likely, home health PT likely, intermittent SPV from [facility] likely    Strengths:  Able to follow instructions, Adequate strength, Alert and oriented, Independent PLOF, Making steady progress towards goals, Motivated for self care and independence, Pleasant and cooperative and Willingly participates in therapeutic activities    Barriers:   Decreased endurance, Generalized weakness, Limited mobility, Poor balance and Other: currently using supplemental oxygen    # of short term goals set= 4 set yesterday after PT eval    # of short term goals met= 0 since yesterday                       Physical Therapy Problems                                       Problem: Mobility                Dates:     Start: 12/26/17                                  Goal: STG-Within one week, patient will ambulate community distances                         Dates:     Start: 12/26/17                Description:     1) Individualized goal:  200 feet with SBA, c/s collar, and FWW    2) Interventions:  PT Group Therapy, PT E Stim Attended, PT Gait Training, PT Therapeutic Exercises, PT Neuro Re-Ed/Balance, PT Therapeutic Activity and PT Manual Therapy.                                    Note:                       Goal Note filed on 12/27/17 2378 by Alessandro Waddell, PT             Goal: STG-Within one week, patient " will ambulate community distances    Outcome: NOT MET    Patient is limited by endurance and balance at this time.  Goals were set     yesterday following PT eval.                                                                     Problem: Mobility Transfers                Dates:     Start: 12/26/17                                  Goal: STG-Within one week, patient will perform bed mobility                         Dates:     Start: 12/26/17                Description:     1) Individualized goal:  with SPV and c/s collar    2) Interventions:  PT Group Therapy, PT E Stim Attended, PT Gait Training, PT Therapeutic Exercises, PT Neuro Re-Ed/Balance, PT Therapeutic Activity and PT Manual Therapy.                                    Note:                       Goal Note filed on 12/27/17 1738 by Alessandro Waddell, PT             Goal: STG-Within one week, patient will perform bed mobility    Outcome: NOT MET    Patient is limited by strength and technique at this time.  Goals were set     yesterday following PT eval.                                                                       Goal: STG-Within one week, patient will sit to stand                         Dates:     Start: 12/26/17                Description:     1) Individualized goal:  with SBA, c/s collar, and FWW    2) Interventions:  PT Group Therapy, PT E Stim Attended, PT Gait Training, PT Therapeutic Exercises, PT Neuro Re-Ed/Balance, PT Therapeutic Activity and PT Manual Therapy.                                    Note:                       Goal Note filed on 12/27/17 1738 by Alessandro Waddell, PT             Goal: STG-Within one week, patient will sit to stand    Outcome: NOT MET    Patient is limited by technique and balance at this time.  Goals were set     yesterday following PT eval.                                                                       Goal: STG-Within one week, patient will transfer in/out of car                          Dates:     Start: 12/26/17                Description:     1) Individualized goal:  with SBA, c/s collar, and FWW    2) Interventions:  PT Group Therapy, PT E Stim Attended, PT Gait Training, PT Therapeutic Exercises, PT Neuro Re-Ed/Balance, PT Therapeutic Activity and PT Manual Therapy.                                         Note:                       Goal Note filed on 12/27/17 1738 by Alessandro Waddell, PT             Goal: STG-Within one week, patient will transfer in/out of car    Outcome: NOT MET    Patient is limited by technique and balance at this time.  Goals were set     yesterday following PT eval.                                                                     Problem: PT-Long Term Goals                Dates:     Start: 12/26/17                            Goal: LTG-By discharge, patient will ambulate                        Dates:     Start: 12/26/17                Description:     1) Individualized goal:  500 feet with Mod I, c/s collar, and FWW    2) Interventions:  PT Group Therapy, PT E Stim Attended, PT Gait Training, PT Therapeutic Exercises, PT Neuro Re-Ed/Balance, PT Therapeutic Activity and PT Manual Therapy.                                                  Goal: LTG-By discharge, patient will transfer one surface to another                        Dates:     Start: 12/26/17                Description:     1) Individualized goal:  with Mod I, c/s collar, and FWW    2) Interventions: PT Group Therapy, PT E Stim Attended, PT Gait Training, PT Therapeutic Exercises, PT Neuro Re-Ed/Balance, PT Therapeutic Activity and PT Manual Therapy.                                                  Goal: LTG-By discharge, patient will ambulate up/down 4-6 stairs                        Dates:     Start: 12/26/17                Description:     1) Individualized goal:  4 standard stairs with SPV, c/s collar, and FWW    2) Interventions: PT Group Therapy, PT E Stim Attended, PT Gait Training, PT  Therapeutic Exercises, PT Neuro Re-Ed/Balance, PT Therapeutic Activity and PT Manual Therapy.                                                     Section completed by:  Alessandro Waddell, PT, DPT              Activities of Daily Living    Eating Initial:  7 - Independent    Eating Current:  4 - Minimal Assistance               Eating Description:  Supervision for safety, Increased time    Grooming Initial:  4 - Minimal Assistance    Grooming Current:  4 - Minimal Assistance               Grooming Description:   (pt completed 4/5 steps seated at sink with min A with shaving for thoroughness, min A with managing toothpaste due to BUE weakness/numbness)    Bathing Initial:  3 - Moderate Assistance    Bathing Current:  3 - Moderate Assistance               Bathing Description:  Grab bar, Tub bench, Hand held shower, Increased time, Supervision for safety, Verbal cueing, Set-up of equipment (pt able to wash/dry 6/10 parts with mod A for feet and bilateral LE's. CGA while standing for amy area and buttocks.)    Upper Body Dressing Initial:  4 - Minimal Assistance    Upper Body Dressing Current:  4 - Minimal Assistance               Upper Body Dressing Description:  Increased time, Supervision for safety, Verbal cueing, Application of orthotic or brace, Set-up of equipment (don/doff pullover shirt with min A for pulling down in back, total A to don C-collar, vc's for spinal precautions)    Lower Body Dressing Initial:  2 - Max Assistance    Lower Body Dressing Current:  3 - Moderate Assistance               Lower Body Dressing Description:  3 - Moderate Assistance    Toileting Initial:  1 - Total Assistance    Toileting Current:  3 - Moderate Assistance               Toileting Description:  Grab bar, Increased time, Supervision for safety, Verbal cueing, Set-up of equipment    Toilet Transfer Initial:  4 - Minimal Assistance    Toilet Transfer Current:  4 - Minimal Assistance               Toilet Transfer Description:   4 - Minimal Assistance    Tub / Shower Transfer Initial:  4 - Minimal Assistance    Tub / Shower Transfer Current:  4 - Minimal Assistance               Tub / Shower Transfer Description:  Grab bar, Increased time, Supervision for safety, Verbal cueing, Set-up of equipment, Initial preparation for task (w/c<>tub bench with grab bars, CGA and vc's for sequencing)    IADL:  To be addressed.      Family Training/Education:  Long handled AE for ADLs, FM coordination activities, spinal precautions, c-collar management.     DME/DC Recommendations:  To be determined.           Strengths:  Able to follow instructions, Alert and oriented, Effective communication skills, Independent PLOF, Making steady progress towards goals, Manages pain appropriately, Motivated for self care and independence, Pleasant and cooperative, Supportive family and Willingly participates in therapeutic activities    Barriers:  Decreased endurance, Poor activity tolerance, Poor balance and Other: decreased sensation, bilateral hand weakness/numbness       # of short term goals set= 4      # of short term goals met= 0 (new admit)                          Occupational Therapy Goals                                       Problem: Bathing                Dates:     Start: 12/26/17                                  Goal: STG-Within one week, patient will bathe                         Dates:     Start: 12/26/17                Description:     1) Individualized Goal:  Min A and use of AE/AD/Compensatory strategies as needed.     2) Interventions:  OT Self Care/ADL, OT Community Reintegration, OT Neuro Re-Ed/Balance, OT Sensory Int Techniques, OT Therapeutic Activity, OT Evaluation and OT Therapeutic Exercise                                    Note:                       Goal Note filed on 12/27/17 1528 by Dana Brar, OT             Goal: STG-Within one week, patient will bathe    Outcome: PROGRESSING AS EXPECTED    Mod A                                                                       Problem: Dressing                Dates:     Start: 12/26/17                                  Goal: STG-Within one week, patient will dress LB                         Dates:     Start: 12/26/17                Description:     1) Individualized Goal:  Min A, min vc's for spinal precautions, and use of AE/AD/Compensatory strategies as needed.     2) Interventions:  OT Self Care/ADL, OT Community Reintegration, OT Neuro Re-Ed/Balance, OT Sensory Int Techniques, OT Therapeutic Activity, OT Evaluation and OT Therapeutic Exercise                                    Note:                       Goal Note filed on 12/27/17 1528 by Dana Brar, OT             Goal: STG-Within one week, patient will dress LB    Outcome: PROGRESSING AS EXPECTED    Mod A with AE                                                                     Problem: Functional Transfers                Dates:     Start: 12/26/17                                  Goal: STG-Within one week, patient will                         Dates:     Start: 12/26/17                Description:     1) Individualized Goal:  Spv/set up and use of least restrictive DME.     2) Interventions:  OT Self Care/ADL, OT Community Reintegration, OT Neuro Re-Ed/Balance, OT Sensory Int Techniques, OT Therapeutic Activity, OT Evaluation and OT Therapeutic Exercise                                    Note:                       Goal Note filed on 12/27/17 1528 by Dana Brar, OT             Goal: STG-Within one week, patient will    Outcome: PROGRESSING AS EXPECTED    Min A                                                                      Problem: Grooming                Dates:     Start: 12/26/17                                  Goal: STG-Within one week, patient will complete grooming                         Dates:     Start: 12/26/17                Description:     1) Individualized Goal:  Spv/set up and use of AE/AD/Compensatory strategies  as needed.     2) Interventions:  OT Self Care/ADL, OT Community Reintegration, OT Neuro Re-Ed/Balance, OT Sensory Int Techniques, OT Therapeutic Activity, OT Evaluation and OT Therapeutic Exercise                                    Note:                       Goal Note filed on 12/27/17 1528 by Dana Brar OT             Goal: STG-Within one week, patient will complete grooming    Outcome: PROGRESSING AS EXPECTED    Min A                                                                      Problem: OT Long Term Goals                Dates:     Start: 12/26/17                            Goal: LTG-By discharge, patient will complete basic self care tasks                        Dates:     Start: 12/26/17                Description:     1) Individualized Goal:  Mod I and use of AE/AD/Compensatory strategies as needed.     2) Interventions:  OT Self Care/ADL, OT Community Reintegration, OT Neuro Re-Ed/Balance, OT Sensory Int Techniques, OT Therapeutic Activity, OT Evaluation and OT Therapeutic Exercise                                                  Goal: LTG-By discharge, patient will perform bathroom transfers                        Dates:     Start: 12/26/17                Description:     1) Individualized Goal:  Mod I with least restrictive AE/DME.     2) Interventions:  OT Self Care/ADL, OT Community Reintegration, OT Neuro Re-Ed/Balance, OT Sensory Int Techniques, OT Therapeutic Activity, OT Evaluation and OT Therapeutic Exercise                                                    Section completed by:  Dana Brar OT                        Nutrition                      Dietary Problems (Active)                                       There are no active problems.                      Nutrition Care/ Consult For Weight Loss PTA         Assessment:         Admitting Diagnosis: Cervical stenosis of spine, S/P cervical spinal fusion, and Hypertension.         Pertinent PMH: PBH, HTN, and hyperlipidemia      "    Additional Information: The pt was seen today in his room, just returning from therapy and about to enjoy ice cream. The pt was noted to have wt loss PTA. The pt reports 20-30# wt loss over the previous 3 months which is planned and positive as the pt wants to 'firm up a bit'. When asked about what he eats at home, the pt says that he normally has small meals with 3 Boosts per day. PO intake has been ~50% of recent meals with 20% average today. The pt states that the portion sizes he is receiving on meal trays is too large for him - he requests smaller portions and is amendable to adding Boost Plus TID.          Appetite: \"good\"    Diet: Regular    Average PO intake x 3 days: ~50%    Labs: Albumin 3.1 (L)    Medications: Tums, Keflex, Colace, Lovenox, Pericolace, Vitamin D,     PRN Medications: Hydralazine, Zofran, Roxicodone, Miralax, Tramadol    IVF: None noted in MAR         Height: 5, 10\" (1.778 m)    Weight: 166# (75.6kg)    IBW: 166# (75.5kg)    BMI: 23.91         Skin: No skin breakdown noted, only surgical incision (neck)    GI: last BM on 12/26    : WNL    Vitals: /75, on 1L O2 by NC    I/Os: Reviewed.         Diagnosis: No nutritional diagnosis at this time.         Intervention/ Recommendations/POC:    1. Continue current diet, add Boost Plus TID to mimic home regimen, smaller portion sizes on meal trays per pt request.    2. Encourage adequate PO/fluid intake.    3. Nutrition rep to see regarding food prefs/ honor within dietary restrictions (if indicated)         Monitor/Evaluation: Monitor PO intake, weight, labs, medication adjustments, skin integrity, GI function, vitals, I/Os, and overall hydration status.  Adjust nutritional POC pending clinical outcomes.     RD following PRN.    Goal: Maintain adequate oral nutrient/fluid intake to promote nutrition optimization/healing.          Section completed by:  Yas Hair R.D.                 No notes of this type exist for this encounter. "                            DC Planning    DC destination/dispostion:  Home w/ supportive spouse; live in [place]; h w/ 2 stairs to enter; walk in shower.      Pt has a fww/hand held shower @ home.          Referrals: None at this time.           DC Needs:   Out paitent therapy.  Follow up w/PCP and Dr. Forbes Neurosurgeon.         Barriers to discharge:   Needs assist w/ adl's.           Strengths: Prior level of function, cognitivly intact; supportive spouse, motivated, making gains w/ therapies.          Section completed by:  ihsan Zamudio ccm          Summary:  Anticipate dc date on Jan 11, 2018.  Monitor for home health vs out pt therapy;  Monitor for dme needs.  Increase  minutes and PT 60 minutes.           Physician Summary    I participated and led team conference discussion.      Total time:  >35 minutes.  I spent greater than 50% of the time for patient care and coordination on this date, including unit/floor time, and face-to-face time with the patient as per assessment and plan above.    Ezequiel Clements M.D.

## 2017-12-28 NOTE — CARE PLAN
Problem: Mobility  Goal: STG-Within one week, patient will ambulate community distances  1) Individualized goal:  200 feet with SBA, c/s collar, and FWW  2) Interventions:  PT Group Therapy, PT E Stim Attended, PT Gait Training, PT Therapeutic Exercises, PT Neuro Re-Ed/Balance, PT Therapeutic Activity and PT Manual Therapy.     Outcome: NOT MET  Patient is limited by endurance and balance at this time.  Goals were set yesterday following PT eval.    Problem: Mobility Transfers  Goal: STG-Within one week, patient will perform bed mobility  1) Individualized goal:  with SPV and c/s collar  2) Interventions:  PT Group Therapy, PT E Stim Attended, PT Gait Training, PT Therapeutic Exercises, PT Neuro Re-Ed/Balance, PT Therapeutic Activity and PT Manual Therapy.     Outcome: NOT MET  Patient is limited by strength and technique at this time.  Goals were set yesterday following PT eval.  Goal: STG-Within one week, patient will sit to stand  1) Individualized goal:  with SBA, c/s collar, and FWW  2) Interventions:  PT Group Therapy, PT E Stim Attended, PT Gait Training, PT Therapeutic Exercises, PT Neuro Re-Ed/Balance, PT Therapeutic Activity and PT Manual Therapy.     Outcome: NOT MET  Patient is limited by technique and balance at this time.  Goals were set yesterday following PT eval.  Goal: STG-Within one week, patient will transfer in/out of car  1) Individualized goal:  with SBA, c/s collar, and FWW  2) Interventions:  PT Group Therapy, PT E Stim Attended, PT Gait Training, PT Therapeutic Exercises, PT Neuro Re-Ed/Balance, PT Therapeutic Activity and PT Manual Therapy.       Outcome: NOT MET  Patient is limited by technique and balance at this time.  Goals were set yesterday following PT eval.

## 2017-12-28 NOTE — PROGRESS NOTES
Received report from RN and assumed care. Pt was up in his chair in the room and watching TV. Pt is alert and oriented x4, and vital signs are stable. Discussed plan of care for the night; pt denies pain and the call light is within reach.

## 2017-12-29 PROCEDURE — 700111 HCHG RX REV CODE 636 W/ 250 OVERRIDE (IP): Performed by: PHYSICAL MEDICINE & REHABILITATION

## 2017-12-29 PROCEDURE — 97110 THERAPEUTIC EXERCISES: CPT

## 2017-12-29 PROCEDURE — 97535 SELF CARE MNGMENT TRAINING: CPT

## 2017-12-29 PROCEDURE — 97116 GAIT TRAINING THERAPY: CPT

## 2017-12-29 PROCEDURE — 97530 THERAPEUTIC ACTIVITIES: CPT

## 2017-12-29 PROCEDURE — A9270 NON-COVERED ITEM OR SERVICE: HCPCS | Performed by: PHYSICAL MEDICINE & REHABILITATION

## 2017-12-29 PROCEDURE — 97533 SENSORY INTEGRATION: CPT

## 2017-12-29 PROCEDURE — 700102 HCHG RX REV CODE 250 W/ 637 OVERRIDE(OP): Performed by: PHYSICAL MEDICINE & REHABILITATION

## 2017-12-29 PROCEDURE — 700112 HCHG RX REV CODE 229: Performed by: PHYSICAL MEDICINE & REHABILITATION

## 2017-12-29 PROCEDURE — 94760 N-INVAS EAR/PLS OXIMETRY 1: CPT

## 2017-12-29 PROCEDURE — 770010 HCHG ROOM/CARE - REHAB SEMI PRIVAT*

## 2017-12-29 PROCEDURE — 99232 SBSQ HOSP IP/OBS MODERATE 35: CPT | Performed by: PHYSICAL MEDICINE & REHABILITATION

## 2017-12-29 RX ADMIN — CEPHALEXIN 500 MG: 250 CAPSULE ORAL at 05:11

## 2017-12-29 RX ADMIN — OXYCODONE HYDROCHLORIDE 5 MG: 5 TABLET ORAL at 17:32

## 2017-12-29 RX ADMIN — CEPHALEXIN 500 MG: 250 CAPSULE ORAL at 00:23

## 2017-12-29 RX ADMIN — BISOPROLOL FUMARATE 5 MG: 5 TABLET, COATED ORAL at 08:21

## 2017-12-29 RX ADMIN — TRAZODONE HYDROCHLORIDE 50 MG: 50 TABLET ORAL at 21:23

## 2017-12-29 RX ADMIN — CALCIUM CARBONATE (ANTACID) CHEW TAB 500 MG 500 MG: 500 CHEW TAB at 21:23

## 2017-12-29 RX ADMIN — OXYCODONE HYDROCHLORIDE 5 MG: 5 TABLET ORAL at 00:23

## 2017-12-29 RX ADMIN — ENOXAPARIN SODIUM 30 MG: 100 INJECTION SUBCUTANEOUS at 08:23

## 2017-12-29 RX ADMIN — FINASTERIDE 5 MG: 5 TABLET, FILM COATED ORAL at 08:21

## 2017-12-29 RX ADMIN — DOCUSATE SODIUM AND SENNOSIDES 1 TABLET: 8.6; 5 TABLET, FILM COATED ORAL at 21:23

## 2017-12-29 RX ADMIN — OXYCODONE HYDROCHLORIDE 5 MG: 5 TABLET ORAL at 11:44

## 2017-12-29 RX ADMIN — DOCUSATE SODIUM 100 MG: 100 CAPSULE, LIQUID FILLED ORAL at 08:21

## 2017-12-29 RX ADMIN — CEPHALEXIN 500 MG: 250 CAPSULE ORAL at 11:44

## 2017-12-29 RX ADMIN — GUAIFENESIN 200 MG: 100 SOLUTION ORAL at 00:23

## 2017-12-29 RX ADMIN — CALCIUM CARBONATE (ANTACID) CHEW TAB 500 MG 500 MG: 500 CHEW TAB at 08:21

## 2017-12-29 RX ADMIN — CEPHALEXIN 500 MG: 250 CAPSULE ORAL at 17:32

## 2017-12-29 RX ADMIN — FLUTICASONE PROPIONATE 100 MCG: 50 SPRAY, METERED NASAL at 09:13

## 2017-12-29 RX ADMIN — DOCUSATE SODIUM 100 MG: 100 CAPSULE, LIQUID FILLED ORAL at 21:23

## 2017-12-29 RX ADMIN — CHOLECALCIFEROL TAB 25 MCG (1000 UNIT) 5000 UNITS: 25 TAB at 08:21

## 2017-12-29 RX ADMIN — ENOXAPARIN SODIUM 30 MG: 100 INJECTION SUBCUTANEOUS at 21:22

## 2017-12-29 RX ADMIN — OXYCODONE HYDROCHLORIDE 5 MG: 5 TABLET ORAL at 08:21

## 2017-12-29 ASSESSMENT — PAIN SCALES - GENERAL
PAINLEVEL_OUTOF10: 5
PAINLEVEL_OUTOF10: 5
PAINLEVEL_OUTOF10: 0

## 2017-12-29 NOTE — CARE PLAN
Problem: Pain Management  Goal: Pain level will decrease to patient's comfort goal  Outcome: PROGRESSING AS EXPECTED  Administered pain medication x1 during the shift (mane 5, see MAR); pt was able to verbalize pain level and had adequate relief from pain with this intervention.    Problem: Urinary Elimination:  Goal: Ability to reestablish a normal urinary elimination pattern will improve  Outcome: PROGRESSING AS EXPECTED  Pt has been continent this shift; pt prefers to stand at the bedside to use the urinal.

## 2017-12-29 NOTE — DISCHARGE PLANNING
"CASE MANAGEMENT INITIAL ASSESSMENT    Admit Date:  12/25/2017     Chart review and demographics confirmed.   Patient is a  77 y.o. male transferred from [facility] where he was hospitalized from 12/21 to 12/25/2017 after being sent from Dr. Forbes's office to [facility] with symptoms of cervical myelopathy for urgent decompression and fusion. Pt is s/p C3-C5 laminectomy with C3-C4 posterior instrumented fusion.       · Accepting Md to [facility] is Dr. Samina Edwards.     Diagnosis: Cervical stenosis of spine  S/P cervical spinal fusion  Hypertension    Co-morbidities:   Patient Active Problem List    Diagnosis Date Noted   • Cervical cord myelomalacia (CMS-HCC) 12/21/2017     Priority: High   • Hypertension 12/25/2017   • Cervical stenosis of spine 12/25/2017   • S/P cervical spinal fusion 12/25/2017   • Smoking history 12/21/2017     Prior Living Situation:  Housing / Facility: 1 Story House (in [place], [place]); 2 steps to enter; walk in shower w/ hand held shower.   Lives with - Patient's Self Care Capacity: Spouse Karina      Prior Level of Function:  Medication Management: Independent  Finances: Independent  Home Management: Independent  Shopping: Independent  Prior Level Of Mobility: Independent Without Device in Community  Driving / Transportation: Driving Independent    Support Systems:  Primary : Karina Garcia Spouse  [phone] and/or [phone]    Other support systems: None   Advance Directives: No  Power of  (Name & Phone): none    Previous Services Utilized:   Equipment Owned: Hand Held Shower and \"an old FWW\"  Prior Services: None    Other Information:  Occupation (Pre-Hospital Vocational): 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 PAPO Haile  Other MDs: Dr. Forbes Neurosurgery    Patient / Family " Goal:  Increase strength,endurance, mobility, and adl's w/ the dc plan to return home w/ assistance from his spouse as needed.        Additional Case Management Questions:  Have you ever received case management services for yourself or a family member?  Uncertain at this time.     Do you feel you have an an understanding of of what services  provide? Uncertain at this time.    Do you have any additional questions regarding case management?    N/a at this time.       Plan:  1. Continue to follow patient through hospitalization and provide discharge planning in collaboration with patient, family, physicians and ancillary services.     2. Utilize community resources to ensure a safe discharge.

## 2017-12-29 NOTE — DISCHARGE PLANNING
CASE MANAGEMENT/attemtped to meet  with pt to provide an update from team conference, plan of care, and projected dc date of Monday 1/11/2018 but not in room.  Updated white board w/ dc date.  Will monitor for home health vs out pt thearpy needs.

## 2017-12-29 NOTE — FLOWSHEET NOTE
Remains on O2 at 2L, sat=91%. No home O2 use. Wean when able. Encourage cough.      12/29/17 1025   Events/Summary/Plan   Non-Invasive Resp Device Site Inspection Completed Intact   Location Nasal cannula   Respiratory WDL   Respiratory (WDL) X   Chest Exam   Respiration 18   Pulse 89   Breath Sounds   RUL Breath Sounds Clear   RML Breath Sounds Clear;Diminished   RLL Breath Sounds Diminished   LATRICIA Breath Sounds Clear   LLL Breath Sounds Diminished   Secretions   Cough Non Productive   How Sputum Obtained Cough on Request   Oximetry   #Pulse Oximetry (Single Determination) Yes   Oxygen   Home O2 Use Prior To Admission? No   Pulse Oximetry 91 %   O2 (LPM) 2   O2 Daily Delivery Respiratory  Silicone Nasal Cannula

## 2017-12-29 NOTE — PROGRESS NOTES
Received report from RN and assumed care. Pt was up in his chair and had visitors at the bedside. Pt is alert and oriented x4, and vital signs are stable. Discussed plan of care for the night; pt denies pain and the call light is within reach.

## 2017-12-29 NOTE — CARE PLAN
Problem: SKIN INTEGRITY  Goal: LTG - PATIENT WILL MAINTAIN/IMPROVE SKIN INTEGRITY THROUGH PROPER SKIN CARE TECHNIQUES.  Outcome: PROGRESSING AS EXPECTED  Pt getting small red, blanching spot and discomfort behind ears, where nasal cannula tubing rests. Applied foam pads to tubing.

## 2017-12-29 NOTE — REHAB-CM IDT TEAM NOTE
Case Management    DC Planning  DC destination/dispostion:  Home w/ supportive spouse; live in [place]; [facility] w/ 2 stairs to enter; walk in shower.    Pt has a fww/hand held shower @ home.     Referrals: None at this time.      DC Needs:   Out paitent therapy.  Follow up w/PCP and Dr. Forbes Neurosurgeon.    Barriers to discharge:   Needs assist w/ adl's.      Strengths: Prior level of function, cognitivly intact; supportive spouse, motivated, making gains w/ therapies.     Section completed by:  ihsan Zamudio ccm

## 2017-12-30 PROCEDURE — 97110 THERAPEUTIC EXERCISES: CPT

## 2017-12-30 PROCEDURE — 770010 HCHG ROOM/CARE - REHAB SEMI PRIVAT*

## 2017-12-30 PROCEDURE — 97535 SELF CARE MNGMENT TRAINING: CPT

## 2017-12-30 PROCEDURE — 97530 THERAPEUTIC ACTIVITIES: CPT

## 2017-12-30 PROCEDURE — 97112 NEUROMUSCULAR REEDUCATION: CPT

## 2017-12-30 PROCEDURE — 97116 GAIT TRAINING THERAPY: CPT

## 2017-12-30 PROCEDURE — 700111 HCHG RX REV CODE 636 W/ 250 OVERRIDE (IP): Performed by: PHYSICAL MEDICINE & REHABILITATION

## 2017-12-30 PROCEDURE — 700102 HCHG RX REV CODE 250 W/ 637 OVERRIDE(OP): Performed by: PHYSICAL MEDICINE & REHABILITATION

## 2017-12-30 PROCEDURE — 700112 HCHG RX REV CODE 229: Performed by: PHYSICAL MEDICINE & REHABILITATION

## 2017-12-30 PROCEDURE — A9270 NON-COVERED ITEM OR SERVICE: HCPCS | Performed by: PHYSICAL MEDICINE & REHABILITATION

## 2017-12-30 PROCEDURE — 94760 N-INVAS EAR/PLS OXIMETRY 1: CPT

## 2017-12-30 RX ADMIN — CEPHALEXIN 500 MG: 250 CAPSULE ORAL at 18:14

## 2017-12-30 RX ADMIN — CEPHALEXIN 500 MG: 250 CAPSULE ORAL at 11:00

## 2017-12-30 RX ADMIN — ENOXAPARIN SODIUM 30 MG: 100 INJECTION SUBCUTANEOUS at 09:14

## 2017-12-30 RX ADMIN — DOCUSATE SODIUM 100 MG: 100 CAPSULE, LIQUID FILLED ORAL at 09:12

## 2017-12-30 RX ADMIN — BISOPROLOL FUMARATE 5 MG: 5 TABLET, COATED ORAL at 09:12

## 2017-12-30 RX ADMIN — DOCUSATE SODIUM AND SENNOSIDES 1 TABLET: 8.6; 5 TABLET, FILM COATED ORAL at 21:31

## 2017-12-30 RX ADMIN — OXYCODONE HYDROCHLORIDE 5 MG: 5 TABLET ORAL at 00:10

## 2017-12-30 RX ADMIN — CALCIUM CARBONATE (ANTACID) CHEW TAB 500 MG 500 MG: 500 CHEW TAB at 09:13

## 2017-12-30 RX ADMIN — POLYETHYLENE GLYCOL 3350 1 PACKET: 17 POWDER, FOR SOLUTION ORAL at 18:14

## 2017-12-30 RX ADMIN — ENOXAPARIN SODIUM 30 MG: 100 INJECTION SUBCUTANEOUS at 21:31

## 2017-12-30 RX ADMIN — DOCUSATE SODIUM 100 MG: 100 CAPSULE, LIQUID FILLED ORAL at 21:31

## 2017-12-30 RX ADMIN — TRAZODONE HYDROCHLORIDE 50 MG: 50 TABLET ORAL at 21:31

## 2017-12-30 RX ADMIN — CALCIUM CARBONATE (ANTACID) CHEW TAB 500 MG 500 MG: 500 CHEW TAB at 21:31

## 2017-12-30 RX ADMIN — CEPHALEXIN 500 MG: 250 CAPSULE ORAL at 05:12

## 2017-12-30 RX ADMIN — OXYCODONE HYDROCHLORIDE 5 MG: 5 TABLET ORAL at 11:00

## 2017-12-30 RX ADMIN — FINASTERIDE 5 MG: 5 TABLET, FILM COATED ORAL at 09:13

## 2017-12-30 RX ADMIN — CEPHALEXIN 500 MG: 250 CAPSULE ORAL at 00:10

## 2017-12-30 RX ADMIN — CHOLECALCIFEROL TAB 25 MCG (1000 UNIT) 5000 UNITS: 25 TAB at 09:13

## 2017-12-30 RX ADMIN — OXYCODONE HYDROCHLORIDE 5 MG: 5 TABLET ORAL at 05:12

## 2017-12-30 RX ADMIN — FLUTICASONE PROPIONATE 100 MCG: 50 SPRAY, METERED NASAL at 09:15

## 2017-12-30 ASSESSMENT — PAIN SCALES - GENERAL
PAINLEVEL_OUTOF10: 0
PAINLEVEL_OUTOF10: 0
PAINLEVEL_OUTOF10: 5
PAINLEVEL_OUTOF10: 5

## 2017-12-30 NOTE — CARE PLAN
Problem: Pain Management:  Goal: Pt will participate in therapies  Outcome: progressing as expected  Pt requested Rebecca 5 mg this morning before therapy. Pt participated in therapy.

## 2017-12-30 NOTE — CARE PLAN
Problem: Bowel/Gastric:  Goal: Normal bowel function is maintained or improved  Outcome: PROGRESSING AS EXPECTED  Pt did have large formed/hard BM today. Reports no further feelings of constipation.

## 2017-12-30 NOTE — CARE PLAN
Problem: Bowel/Gastric:   Goal: Normal bowel function is maintained or improved  Pt last BM since 12/29. Prior BM 12/26. Colace 100 mg cap was given. Pt requests Miralax as pt reports feeling constipated. Encouraged pt to increase oral fluid. Will continue to monitor.

## 2017-12-30 NOTE — CARE PLAN
Problem: Safety  Goal: Will remain free from falls  Call light with in reach. Redirection to use call light for assistance. Non skid socks. Upper siderails up x2 while in bed.    Problem: Pain Management  Goal: Pain level will decrease to patient's comfort goal  Educate patient of non-pharmacological comfort measures: repositioning, relaxation/breathing technique, cold compress and activities. No complains of pain at this time. Complains of unable to sleep, as needed medication given with good effect. O2 n/c in place at 2lpm with O2 saturation of 91%. Coughing noted, HOB up 30 degrees with [place]pen collar intact. Able to make needs known. Assisted as needed. Continues ABT, no adverse reaction. Will continue to monitor.

## 2017-12-31 PROCEDURE — 700112 HCHG RX REV CODE 229: Performed by: PHYSICAL MEDICINE & REHABILITATION

## 2017-12-31 PROCEDURE — 770010 HCHG ROOM/CARE - REHAB SEMI PRIVAT*

## 2017-12-31 PROCEDURE — 94760 N-INVAS EAR/PLS OXIMETRY 1: CPT

## 2017-12-31 PROCEDURE — 700111 HCHG RX REV CODE 636 W/ 250 OVERRIDE (IP): Performed by: PHYSICAL MEDICINE & REHABILITATION

## 2017-12-31 PROCEDURE — A9270 NON-COVERED ITEM OR SERVICE: HCPCS | Performed by: PHYSICAL MEDICINE & REHABILITATION

## 2017-12-31 PROCEDURE — 700102 HCHG RX REV CODE 250 W/ 637 OVERRIDE(OP): Performed by: PHYSICAL MEDICINE & REHABILITATION

## 2017-12-31 RX ADMIN — ENOXAPARIN SODIUM 30 MG: 100 INJECTION SUBCUTANEOUS at 21:38

## 2017-12-31 RX ADMIN — FLUTICASONE PROPIONATE 100 MCG: 50 SPRAY, METERED NASAL at 07:45

## 2017-12-31 RX ADMIN — DOCUSATE SODIUM 100 MG: 100 CAPSULE, LIQUID FILLED ORAL at 07:44

## 2017-12-31 RX ADMIN — OXYCODONE HYDROCHLORIDE 5 MG: 5 TABLET ORAL at 05:02

## 2017-12-31 RX ADMIN — OXYCODONE HYDROCHLORIDE 5 MG: 5 TABLET ORAL at 00:06

## 2017-12-31 RX ADMIN — CEPHALEXIN 500 MG: 250 CAPSULE ORAL at 00:06

## 2017-12-31 RX ADMIN — FINASTERIDE 5 MG: 5 TABLET, FILM COATED ORAL at 07:44

## 2017-12-31 RX ADMIN — CALCIUM CARBONATE (ANTACID) CHEW TAB 500 MG 500 MG: 500 CHEW TAB at 21:38

## 2017-12-31 RX ADMIN — OXYCODONE HYDROCHLORIDE 5 MG: 5 TABLET ORAL at 23:58

## 2017-12-31 RX ADMIN — TRAZODONE HYDROCHLORIDE 50 MG: 50 TABLET ORAL at 21:38

## 2017-12-31 RX ADMIN — CEPHALEXIN 500 MG: 250 CAPSULE ORAL at 17:34

## 2017-12-31 RX ADMIN — CALCIUM CARBONATE (ANTACID) CHEW TAB 500 MG 500 MG: 500 CHEW TAB at 07:44

## 2017-12-31 RX ADMIN — CEPHALEXIN 500 MG: 250 CAPSULE ORAL at 11:42

## 2017-12-31 RX ADMIN — ENOXAPARIN SODIUM 30 MG: 100 INJECTION SUBCUTANEOUS at 07:44

## 2017-12-31 RX ADMIN — CHOLECALCIFEROL TAB 25 MCG (1000 UNIT) 5000 UNITS: 25 TAB at 07:44

## 2017-12-31 RX ADMIN — BISOPROLOL FUMARATE 5 MG: 5 TABLET, COATED ORAL at 07:44

## 2017-12-31 RX ADMIN — OXYCODONE HYDROCHLORIDE 5 MG: 5 TABLET ORAL at 17:37

## 2017-12-31 RX ADMIN — CEPHALEXIN 500 MG: 250 CAPSULE ORAL at 23:58

## 2017-12-31 RX ADMIN — DOCUSATE SODIUM AND SENNOSIDES 1 TABLET: 8.6; 5 TABLET, FILM COATED ORAL at 21:38

## 2017-12-31 RX ADMIN — DOCUSATE SODIUM 100 MG: 100 CAPSULE, LIQUID FILLED ORAL at 21:38

## 2017-12-31 RX ADMIN — CEPHALEXIN 500 MG: 250 CAPSULE ORAL at 05:02

## 2017-12-31 ASSESSMENT — PAIN SCALES - GENERAL
PAINLEVEL_OUTOF10: 0
PAINLEVEL_OUTOF10: 5
PAINLEVEL_OUTOF10: 5
PAINLEVEL_OUTOF10: 6
PAINLEVEL_OUTOF10: 0
PAINLEVEL_OUTOF10: 0

## 2017-12-31 NOTE — FLOWSHEET NOTE
Remains on O2 at 2L, sat=91%. No weaning today. Encourage cough and compliance with O2 use.      12/31/17 0750   Events/Summary/Plan   Non-Invasive Resp Device Site Inspection Completed Intact   Location Nasal cannula   Respiratory WDL   Respiratory (WDL) X   Chest Exam   Respiration 18   Pulse 88   Breath Sounds   RUL Breath Sounds Clear   RML Breath Sounds Diminished   RLL Breath Sounds Diminished   LATRICIA Breath Sounds Clear   LLL Breath Sounds Diminished   Secretions   Cough Moist;Strong;Non Productive   How Sputum Obtained Cough on Request;Spontaneous   Oximetry   #Pulse Oximetry (Single Determination) Yes   Oxygen   Home O2 Use Prior To Admission? No   Pulse Oximetry 91 %   O2 (LPM) 2   O2 Daily Delivery Respiratory  Silicone Nasal Cannula

## 2017-12-31 NOTE — CARE PLAN
Problem: Respiratory:  Goal: Respiratory status will improve  Outcome: PROGRESSING SLOWER THAN EXPECTED  O2 91% 2L via nc. Expiratory wheezes this am which cleared up after pt deep breathed and coughed 3 x w/in approx 10 min. Pt educated on reasons for using IS/deepbreathing and coughing to clear lungs, and demonstrates correct use of IS after cuing. Observed pt obtain 1750 x 1 and 1500 x 3 on IS. Encouraging its use 10x/hr while awake or if pt prefers, 3-4x at commercials during football game. Pt verbalizes understanding - reinforcement needed.

## 2017-12-31 NOTE — FLOWSHEET NOTE
12/30/17 1000   Events/Summary/Plan   Non-Invasive Resp Device Site Inspection Completed Intact   Location NC   Respiratory WDL   Respiratory (WDL) X   Chest Exam   Respiration 18   Pulse 83   Oximetry   #Pulse Oximetry (Single Determination) Yes   Oxygen   Home O2 Use Prior To Admission? No   Pulse Oximetry 92 %   O2 (LPM) 2   O2 Daily Delivery Respiratory  Nasal Cannula

## 2017-12-31 NOTE — CARE PLAN
Problem: Safety  Goal: Will remain free from injury  Call light with in reach. Redirection to use call light for assistance. Non skid socks. Upper siderails up x2 while in bed.    Problem: Respiratory:  Goal: Respiratory status will improve  O2 n/c in place at 2lpm with O2 saturation of 92%. HOB up 30 degrees. [place] collar intact. No complains of pain at this time. Complains of unable to sleep, as needed medication given with good effect. Continue ABT, no adverse reaction. Able to make needs known, assisted as needed. Will continue to monitor.

## 2017-12-31 NOTE — PROGRESS NOTES
Bedside hand off report received from Sheree WASHINGTON, POC discussed, pt resting in bed, no s/s distress noted, denies pain or concerns at this time, monitoring in progress.

## 2017-12-31 NOTE — CARE PLAN
Problem: SKIN INTEGRITY  Goal: LTG - PATIENT WILL MAINTAIN/IMPROVE SKIN INTEGRITY THROUGH PROPER SKIN CARE TECHNIQUES.  Outcome: PROGRESSING SLOWER THAN EXPECTED  Blanchable redness at nc site top of right ear, skin intact. Pt cannula changed out to silicon cannula with foam ear protectors placed. Pt reports improved comfort with intervention. Will continue to monitor.

## 2018-01-01 PROCEDURE — 94760 N-INVAS EAR/PLS OXIMETRY 1: CPT

## 2018-01-01 PROCEDURE — 97110 THERAPEUTIC EXERCISES: CPT

## 2018-01-01 PROCEDURE — 770010 HCHG ROOM/CARE - REHAB SEMI PRIVAT*

## 2018-01-01 PROCEDURE — 99233 SBSQ HOSP IP/OBS HIGH 50: CPT | Performed by: PHYSICAL MEDICINE & REHABILITATION

## 2018-01-01 PROCEDURE — 700111 HCHG RX REV CODE 636 W/ 250 OVERRIDE (IP): Performed by: PHYSICAL MEDICINE & REHABILITATION

## 2018-01-01 PROCEDURE — 97530 THERAPEUTIC ACTIVITIES: CPT

## 2018-01-01 PROCEDURE — 700112 HCHG RX REV CODE 229: Performed by: PHYSICAL MEDICINE & REHABILITATION

## 2018-01-01 PROCEDURE — A9270 NON-COVERED ITEM OR SERVICE: HCPCS | Performed by: PHYSICAL MEDICINE & REHABILITATION

## 2018-01-01 PROCEDURE — 700102 HCHG RX REV CODE 250 W/ 637 OVERRIDE(OP): Performed by: PHYSICAL MEDICINE & REHABILITATION

## 2018-01-01 PROCEDURE — 97112 NEUROMUSCULAR REEDUCATION: CPT

## 2018-01-01 RX ORDER — GUAIFENESIN 600 MG/1
600 TABLET, EXTENDED RELEASE ORAL 3 TIMES DAILY PRN
Status: DISCONTINUED | OUTPATIENT
Start: 2018-01-01 | End: 2018-01-08 | Stop reason: HOSPADM

## 2018-01-01 RX ADMIN — FINASTERIDE 5 MG: 5 TABLET, FILM COATED ORAL at 10:06

## 2018-01-01 RX ADMIN — ENOXAPARIN SODIUM 30 MG: 100 INJECTION SUBCUTANEOUS at 10:06

## 2018-01-01 RX ADMIN — ENOXAPARIN SODIUM 30 MG: 100 INJECTION SUBCUTANEOUS at 19:51

## 2018-01-01 RX ADMIN — TRAZODONE HYDROCHLORIDE 50 MG: 50 TABLET ORAL at 23:12

## 2018-01-01 RX ADMIN — CALCIUM CARBONATE (ANTACID) CHEW TAB 500 MG 500 MG: 500 CHEW TAB at 10:04

## 2018-01-01 RX ADMIN — OXYCODONE HYDROCHLORIDE 5 MG: 5 TABLET ORAL at 04:55

## 2018-01-01 RX ADMIN — BISOPROLOL FUMARATE 5 MG: 5 TABLET, COATED ORAL at 10:06

## 2018-01-01 RX ADMIN — CEPHALEXIN 500 MG: 250 CAPSULE ORAL at 04:55

## 2018-01-01 RX ADMIN — DOCUSATE SODIUM 100 MG: 100 CAPSULE, LIQUID FILLED ORAL at 19:50

## 2018-01-01 RX ADMIN — DOCUSATE SODIUM AND SENNOSIDES 1 TABLET: 8.6; 5 TABLET, FILM COATED ORAL at 19:50

## 2018-01-01 RX ADMIN — CEPHALEXIN 500 MG: 250 CAPSULE ORAL at 11:47

## 2018-01-01 RX ADMIN — FLUTICASONE PROPIONATE 100 MCG: 50 SPRAY, METERED NASAL at 10:11

## 2018-01-01 RX ADMIN — CALCIUM CARBONATE (ANTACID) CHEW TAB 500 MG 500 MG: 500 CHEW TAB at 19:50

## 2018-01-01 RX ADMIN — DOCUSATE SODIUM 100 MG: 100 CAPSULE, LIQUID FILLED ORAL at 10:05

## 2018-01-01 RX ADMIN — CHOLECALCIFEROL TAB 25 MCG (1000 UNIT) 5000 UNITS: 25 TAB at 10:05

## 2018-01-01 ASSESSMENT — PAIN SCALES - GENERAL
PAINLEVEL_OUTOF10: 0
PAINLEVEL_OUTOF10: 0
PAINLEVEL_OUTOF10: 6
PAINLEVEL_OUTOF10: 6

## 2018-01-01 NOTE — PROGRESS NOTES
"Rehab Progress Note     Date of Service: 12/29/2017   Chief complaint: bladder accident, follow up cervical myelopathy    Interval Events (Subjective)    Patient seen and examined. He was seen in his room as well as with physical therapy again today.The therapist appreciated discussed his progress the treatment team doing very well patient does have some pain but minimal does reported to me but he is doing well    Objective:  VITAL SIGNS: /71   Pulse 72   Temp 36.7 °C (98 °F)   Resp 18   Ht 1.778 m (5' 10\")   Wt 76.5 kg (168 lb 10.4 oz)   SpO2 97%   BMI 24.20 kg/m²     Gen: alert, no apparent distress  HEENT: cervical collar in place  CV: regular rate and rhythm, no murmurs, mild 1+ peripheral edema at shins  Resp: clear to ascultation bilaterally, normal respiratory effort  GI: soft, non-tender abdomen, bowel sounds present  Neuro: No  change      No results found for this or any previous visit (from the past 72 hour(s)).    Current Facility-Administered Medications   Medication Frequency   • guaiFENesin (ROBITUSSIN) 100 MG/5ML solution 200 mg Q4HRS PRN   • Respiratory Care per Protocol Continuous RT   • Pharmacy Consult Request ...Pain Management Review 1 Each PRN   • oxycodone immediate-release (ROXICODONE) tablet 5 mg Q3HRS PRN   • tramadol (ULTRAM) 50 MG tablet 50 mg Q4HRS PRN   • artificial tears 1.4 % ophthalmic solution 1 Drop PRN   • hydrALAZINE (APRESOLINE) tablet 25 mg Q8HRS PRN   • mag hydrox-al hydrox-simeth (MAALOX PLUS ES or MYLANTA DS) suspension 20 mL Q2HRS PRN   • trazodone (DESYREL) tablet 50 mg QHS PRN   • sodium chloride (OCEAN) 0.65 % nasal spray 2 Spray PRN   • calcium carbonate (TUMS) chewable tab 500 mg BID   • cyclobenzaprine (FLEXERIL) tablet 10 mg Q8HRS PRN   • docusate sodium (COLACE) capsule 100 mg BID   • ondansetron (ZOFRAN ODT) dispertab 4 mg Q4HRS PRN   • polyethylene glycol/lytes (MIRALAX) PACKET 1 Packet BID PRN   • senna-docusate (PERICOLACE or SENOKOT S) 8.6-50 MG " per tablet 1 Tab Nightly   • vitamin D (cholecalciferol) tablet 5,000 Units DAILY   • bisoprolol (ZEBETA) tablet 5 mg DAILY   • cephALEXin (KEFLEX) capsule 500 mg Q6HRS   • enoxaparin (LOVENOX) inj 30 mg Q12HRS   • finasteride (PROSCAR) tablet 5 mg DAILY   • fluticasone (FLONASE) nasal spray 100 mcg DAILY       Orders Placed This Encounter   Procedures   • Diet Order     Standing Status:   Standing     Number of Occurrences:   1     Order Specific Question:   Diet:     Answer:   Regular [1]     Comments:   pt requests smaller portions please       Assessment:  Active Hospital Problems    Diagnosis   • Hypertension   • Cervical stenosis of spine   • S/P cervical spinal fusion     This patient is a 77 y.o. male admitted for acute inpatient rehabilitation with cervical myelopathy (nontraumatic), from severe cervical spinal stenosis s/p urgent C3-C5 laminectomy with C3-C4 posterior instrumented fusion by Dr. Forbes.     Medical Decision Making and Plan:    Cervical myelopathy/non-traumatic - with left arm monoparesis. Continue full rehab program. Incision looks better Spinal precautions.    Pain management - scheduled tylenol. PRN Narcotics    UTI/dysuria/incontinence - continue Keflex until culture returns.Urine culture shows no growth so farNo no growth ×48 hours    Hypertension - continue bisoprolol. Has some tachycardia. Monitor.    BPH - continue finasteride. Check PVRs.    Decreased protein/albumin - dietician consult.    Low Vit D - 29. Continue supplementation.    Bladder -Now continent    Bowel - meds as needed. Last BM 12/25/2017.    DVT prophylaxis - Lovenox.    Continue PT and OT  Patient is doing very well  Tentative discharge date is 1/11/2018And patient is on track if not possibly leaving sooner          Total time:  >25 minutes.  I spent greater than 50% of the time for patient care and coordination on this date, including unit/floor time, and face-to-face time with the patient as per assessment and plan  above.    Ezequiel Clements M.D.

## 2018-01-01 NOTE — CARE PLAN
Problem: Safety  Goal: Will remain free from falls  Call light with in reach. Redirection to use call light for assistance. Non skid socks. Upper siderails up x2 while in bed.    Problem: Pain Management  Goal: Pain level will decrease to patient's comfort goal  Educate patient of non-pharmacological comfort measures: repositioning, relaxation/breathing technique, cold compress and activities. Patient with no complains of pain at this time. O2 n/c in place at 2lpm with O2 saturation of 91%. HOB up 30 degrees. [place] collar intact. Elevated BLE with pillow. Assist in using urinal. Able to make needs known. Assisted as needed. Will continue to monitor.

## 2018-01-01 NOTE — FLOWSHEET NOTE
01/01/18 0857   Events/Summary/Plan   Events/Summary/Plan 02 spot check, lungs sound okay right now; pt still has productive cough.  Discussed the benefits of guaefenisen with him and will discuss with MD too.  Pt is more agreeable to taking a pill than cough syrup    Education   Education Yes - Pt. / Family has been Instructed in use of Respiratory Equipment;Yes - Pt. / Family has been Instructed in use of Respiratory Medications and Adverse Reactions   Respiratory WDL   Respiratory (WDL) X   Chest Exam   Respiration 18   Pulse 90   Breath Sounds   RML Breath Sounds Diminished   RLL Breath Sounds Diminished   LLL Breath Sounds Diminished   Secretions   Cough Congested;Productive;Swallowed   Oximetry   #Pulse Oximetry (Single Determination) Yes   Oxygen   Pulse Oximetry 90 %   O2 (LPM) 2   O2 Daily Delivery Respiratory  Silicone Nasal Cannula

## 2018-01-01 NOTE — PROGRESS NOTES
Rehab Progress Note     Encounter Date: 1/1/2018    CC: left hand pain. Left foot/ankle swelling    Interval Events (Subjective)  Interval swelling and some pain of the left ankle, foot and calf. No trauma.     Interval pain in the left 4th digits. Moderate aching pain, nonradiating. Started one day ago. Pain and swelling in the PIP.     Objective:  VITAL SIGNS:   Vitals:    12/31/17 1900 12/31/17 2138 01/01/18 0700 01/01/18 0857   BP: 117/71  147/87    Pulse: (!) 101 72 87 90   Resp: 18 18 18   Temp: 36.7 °C (98 °F)  37.1 °C (98.7 °F)    SpO2: 97%   90%   Weight:       Height:           Constitutional: NAD,   HENT: NCAT, oral pharynx clear   Eyes: EOMI. PERRL  Neck: supple, no LA  Cardiovascular: RRR, nl S1/S2, no murmurs.  Thorax & Lungs: CTA bilaterally  Abdomen: soft, non-tender, non-distended, BS present.  Skin: warm/dry/intact  Back:  Full range of motion low back, without pain  Genitalia: Deferred  Rectal: deferred  Extremities: mild swelling with pain to palpation at the left 4th PIP. Pain with finger flexion. Pitting edema of the left foot, ankle and distal calf. No swelling of the RLE.   Neurologic: alert and oriented x 3. 4/5 strength in left hand. 5/5 strength right hand.     No results found for this or any previous visit (from the past 72 hour(s)).    Current Facility-Administered Medications   Medication Frequency   • guaifenesin LA (MUCINEX) tablet 600 mg TID PRN   • Respiratory Care per Protocol Continuous RT   • Pharmacy Consult Request ...Pain Management Review 1 Each PRN   • oxycodone immediate-release (ROXICODONE) tablet 5 mg Q3HRS PRN   • tramadol (ULTRAM) 50 MG tablet 50 mg Q4HRS PRN   • artificial tears 1.4 % ophthalmic solution 1 Drop PRN   • hydrALAZINE (APRESOLINE) tablet 25 mg Q8HRS PRN   • mag hydrox-al hydrox-simeth (MAALOX PLUS ES or MYLANTA DS) suspension 20 mL Q2HRS PRN   • trazodone (DESYREL) tablet 50 mg QHS PRN   • sodium chloride (OCEAN) 0.65 % nasal spray 2 Spray PRN   •  calcium carbonate (TUMS) chewable tab 500 mg BID   • cyclobenzaprine (FLEXERIL) tablet 10 mg Q8HRS PRN   • docusate sodium (COLACE) capsule 100 mg BID   • ondansetron (ZOFRAN ODT) dispertab 4 mg Q4HRS PRN   • polyethylene glycol/lytes (MIRALAX) PACKET 1 Packet BID PRN   • senna-docusate (PERICOLACE or SENOKOT S) 8.6-50 MG per tablet 1 Tab Nightly   • vitamin D (cholecalciferol) tablet 5,000 Units DAILY   • bisoprolol (ZEBETA) tablet 5 mg DAILY   • enoxaparin (LOVENOX) inj 30 mg Q12HRS   • finasteride (PROSCAR) tablet 5 mg DAILY   • fluticasone (FLONASE) nasal spray 100 mcg DAILY       Orders Placed This Encounter   Procedures   • Diet Order     Standing Status:   Standing     Number of Occurrences:   1     Order Specific Question:   Diet:     Answer:   Regular [1]     Comments:   pt requests smaller portions please       Assessment:  Active Hospital Problems    Diagnosis   • Hypertension   • Cervical stenosis of spine   • S/P cervical spinal fusion       Medical Decision Making and Plan:  Left lower extremity swelling and pain. Atraumatic. On enoxaparin prophylaxis but DVT is still on the differential.   - STAT venous ultrasound study for LLE    Swelling and pain of the left 4th DIP for the past day. Patient was doing therapy but does not recall trauma.   - XR ordered to r/o Fx.     Breathing comfortably       Total time:  37 minutes.  I spent greater than 50% of the time for patient care and coordination on this date, including unit/floor time, and face-to-face time with the patient as per assessment and plan above.    Edwar Paige M.D.

## 2018-01-02 ENCOUNTER — APPOINTMENT (OUTPATIENT)
Dept: RADIOLOGY | Facility: REHABILITATION | Age: 78
DRG: 560 | End: 2018-01-02
Attending: PHYSICAL MEDICINE & REHABILITATION
Payer: MEDICARE

## 2018-01-02 PROCEDURE — 97535 SELF CARE MNGMENT TRAINING: CPT

## 2018-01-02 PROCEDURE — 97530 THERAPEUTIC ACTIVITIES: CPT

## 2018-01-02 PROCEDURE — 770010 HCHG ROOM/CARE - REHAB SEMI PRIVAT*

## 2018-01-02 PROCEDURE — 99233 SBSQ HOSP IP/OBS HIGH 50: CPT | Performed by: PHYSICAL MEDICINE & REHABILITATION

## 2018-01-02 PROCEDURE — 93971 EXTREMITY STUDY: CPT | Mod: 26 | Performed by: SURGERY

## 2018-01-02 PROCEDURE — 700102 HCHG RX REV CODE 250 W/ 637 OVERRIDE(OP): Performed by: PHYSICAL MEDICINE & REHABILITATION

## 2018-01-02 PROCEDURE — A9270 NON-COVERED ITEM OR SERVICE: HCPCS | Performed by: PHYSICAL MEDICINE & REHABILITATION

## 2018-01-02 PROCEDURE — 94760 N-INVAS EAR/PLS OXIMETRY 1: CPT

## 2018-01-02 PROCEDURE — 700111 HCHG RX REV CODE 636 W/ 250 OVERRIDE (IP): Performed by: PHYSICAL MEDICINE & REHABILITATION

## 2018-01-02 PROCEDURE — 97112 NEUROMUSCULAR REEDUCATION: CPT

## 2018-01-02 PROCEDURE — 93971 EXTREMITY STUDY: CPT

## 2018-01-02 PROCEDURE — 97110 THERAPEUTIC EXERCISES: CPT

## 2018-01-02 PROCEDURE — 700112 HCHG RX REV CODE 229: Performed by: PHYSICAL MEDICINE & REHABILITATION

## 2018-01-02 PROCEDURE — 97116 GAIT TRAINING THERAPY: CPT

## 2018-01-02 PROCEDURE — 73140 X-RAY EXAM OF FINGER(S): CPT | Mod: LT

## 2018-01-02 RX ADMIN — CHOLECALCIFEROL TAB 25 MCG (1000 UNIT) 5000 UNITS: 25 TAB at 08:49

## 2018-01-02 RX ADMIN — CALCIUM CARBONATE (ANTACID) CHEW TAB 500 MG 500 MG: 500 CHEW TAB at 08:44

## 2018-01-02 RX ADMIN — DOCUSATE SODIUM 100 MG: 100 CAPSULE, LIQUID FILLED ORAL at 08:50

## 2018-01-02 RX ADMIN — DOCUSATE SODIUM 100 MG: 100 CAPSULE, LIQUID FILLED ORAL at 19:50

## 2018-01-02 RX ADMIN — BISOPROLOL FUMARATE 5 MG: 5 TABLET, COATED ORAL at 08:50

## 2018-01-02 RX ADMIN — TRAZODONE HYDROCHLORIDE 50 MG: 50 TABLET ORAL at 22:02

## 2018-01-02 RX ADMIN — FLUTICASONE PROPIONATE 100 MCG: 50 SPRAY, METERED NASAL at 07:18

## 2018-01-02 RX ADMIN — ENOXAPARIN SODIUM 30 MG: 100 INJECTION SUBCUTANEOUS at 19:50

## 2018-01-02 RX ADMIN — FINASTERIDE 5 MG: 5 TABLET, FILM COATED ORAL at 08:50

## 2018-01-02 RX ADMIN — DOCUSATE SODIUM AND SENNOSIDES 1 TABLET: 8.6; 5 TABLET, FILM COATED ORAL at 19:50

## 2018-01-02 RX ADMIN — ENOXAPARIN SODIUM 30 MG: 100 INJECTION SUBCUTANEOUS at 08:54

## 2018-01-02 RX ADMIN — CALCIUM CARBONATE (ANTACID) CHEW TAB 500 MG 500 MG: 500 CHEW TAB at 19:50

## 2018-01-02 ASSESSMENT — PAIN SCALES - GENERAL
PAINLEVEL_OUTOF10: 0
PAINLEVEL_OUTOF10: 0

## 2018-01-02 NOTE — CARE PLAN
Problem: Safety  Goal: Will remain free from falls    Intervention: Implement fall precautions  Use of call light encouraged to make needs known.Bed in low position, non skid socks/shoes and aspen collar on when out of bed.No impulsive activity noted.Call light and things within reach.Will continue to monitor and assess needs and safety.      Problem: Pain Management  Goal: Pain level will decrease to patient's comfort goal  Pt is calm, comfortable and no sign of acute distress noted.Repositioned with pillows for comfort.Will continue to monitor and assess pain level and medicate as needed.    Problem: Urinary Elimination:  Goal: Ability to reestablish a normal urinary elimination pattern will improve    Intervention: Assist patient to sit on commode or toilet for voiding  Assisted to the bathroom, continent of bladder.Denies any discomfort or dysuria.Will continue to monitor and assess.

## 2018-01-02 NOTE — CARE PLAN
Problem: Communication  Goal: The ability to communicate needs accurately and effectively will improve  Patient alert and oriented x 4,  And able to communicate his needs accurately and effectively.     Problem: Pain Management  Goal: Pain level will decrease to patient's comfort goal   01/01/18 0745   OTHER   Nurse Pain Scale 0 - 10  0   Non Verbal Scale  Calm;Unlabored Breathing   Comfort Goal Comfort at Rest     Patient states he is not in pain at this time.  He did show me that his left hand, last two digits were swollen and he stated it is painful at times.  Noted patient was unable to  firmly.  Left leg swollen as well.  Charge nurse Amy WASHINGTON notified.  Will notify physiatrist when rounding this am.

## 2018-01-02 NOTE — PROGRESS NOTES
"Rehab Progress Note     Date of Service: 1/2/2018   Chief complaint: LLE swelling and left finger pain, follow up cervical myelopathy    Interval Events (Subjective)    Patient seen and examined in his room. He continues to have LLE swelling - dopplers negative for DVT. He also continues to have pain/swelling in his left 4th PIP. xrays negative. He reports a history of intermittent pain and swelling in his joints and has been told prior that it's arthritis, or rheumatism. He also reports chronic urinary frequency without any dysuria. He thinks his hand strength and fine motor coordination has greatly improved though he still has paresthesias. He has a cough which he says is chronic, and he denies shortness of breath. He reports his oxygen saturation went down to 83 after working with therapy. He is worried about getting off oxygen, which he wasn't on at home.     Objective:  VITAL SIGNS: /68   Pulse 77   Temp 36.4 °C (97.5 °F)   Resp 18   Ht 1.778 m (5' 10\")   Wt 76.5 kg (168 lb 10.4 oz)   SpO2 91%   BMI 24.20 kg/m²     Gen: alert, no apparent distress  HEENT: cervical collar in place  CV: regular rate and rhythm, no murmurs, mild 1+ peripheral edema at shins  Resp: clear to ascultation bilaterally, loose non-productive cough, normal respiratory effort, on 2 L O2  GI: soft, non-tender abdomen, bowel sounds present  Msk: swelling/tenderness over left 4th PIP.  Neuro: notable for left arm monoparesis, improving      No results found for this or any previous visit (from the past 72 hour(s)).    Current Facility-Administered Medications   Medication Frequency   • guaifenesin LA (MUCINEX) tablet 600 mg TID PRN   • Respiratory Care per Protocol Continuous RT   • Pharmacy Consult Request ...Pain Management Review 1 Each PRN   • oxycodone immediate-release (ROXICODONE) tablet 5 mg Q3HRS PRN   • tramadol (ULTRAM) 50 MG tablet 50 mg Q4HRS PRN   • artificial tears 1.4 % ophthalmic solution 1 Drop PRN   • " hydrALAZINE (APRESOLINE) tablet 25 mg Q8HRS PRN   • mag hydrox-al hydrox-simeth (MAALOX PLUS ES or MYLANTA DS) suspension 20 mL Q2HRS PRN   • trazodone (DESYREL) tablet 50 mg QHS PRN   • sodium chloride (OCEAN) 0.65 % nasal spray 2 Spray PRN   • calcium carbonate (TUMS) chewable tab 500 mg BID   • cyclobenzaprine (FLEXERIL) tablet 10 mg Q8HRS PRN   • docusate sodium (COLACE) capsule 100 mg BID   • ondansetron (ZOFRAN ODT) dispertab 4 mg Q4HRS PRN   • polyethylene glycol/lytes (MIRALAX) PACKET 1 Packet BID PRN   • senna-docusate (PERICOLACE or SENOKOT S) 8.6-50 MG per tablet 1 Tab Nightly   • vitamin D (cholecalciferol) tablet 5,000 Units DAILY   • bisoprolol (ZEBETA) tablet 5 mg DAILY   • enoxaparin (LOVENOX) inj 30 mg Q12HRS   • finasteride (PROSCAR) tablet 5 mg DAILY   • fluticasone (FLONASE) nasal spray 100 mcg DAILY       Orders Placed This Encounter   Procedures   • Diet Order     Standing Status:   Standing     Number of Occurrences:   1     Order Specific Question:   Diet:     Answer:   Regular [1]     Comments:   pt requests smaller portions please       Assessment:  Active Hospital Problems    Diagnosis   • Hypertension   • Cervical stenosis of spine   • S/P cervical spinal fusion     This patient is a 77 y.o. male admitted for acute inpatient rehabilitation with cervical myelopathy (nontraumatic), from severe cervical spinal stenosis s/p urgent C3-C5 laminectomy with C3-C4 posterior instrumented fusion by Dr. Forbes.    Dr Clements led the IDT conference on 12/28/2017:    RN:  Diet Regular diet   % Meal  Appetite 20-80%   Pain [place] mod OXy 5   Sleep Well   Bowel Continent   Bladder Continent   In's & Out's        PT:  Bed Mobility SBA   Transfers SBA   Mobility 200 feet FWW cgA  400 feet WC supervision   Stairs Min Assist with stairs And handrail   Hand numbness     02 tangles limited endurance  OT:  Eating Doing OK takes longer With adaptive equipment    Grooming Min asssit for shaving  VC For precautions     Bathing ModAssist and cueing   UB Dressing MIn   LB Dressing Mod    Toileting MOd   Shower & Transfer MIn to mod showers  Supervision for transfers      CM:  Continues to work on disposition and DME needs.      DC/Disposition:  Tentative discharge date set for 1/11/2018 1/2/2018 - increase PT to 120 minutes and decrease OT to 60 min, 5 days per week       Medical Decision Making and Plan:    Cervical myelopathy/non-traumatic - with left arm monoparesis. Continue full rehab program. Continue collar at all times. Spinal precautions.    Pain management - PRN oxycodone and tramadol.    Left LE swelling - DVT negative. Likely dependent edema. Sathya hose.    L 4th digit finger swelling - xrays with osteopenia. No fracture. Continue conservative treatment with ice/pain meds. Cannot use NSAIDs due to cervical surgery.     Respiratory insufficiency - unclear etiology. Possible contribution of C3/4/5 nerve root to phrenic nerve? Patient does report a history of COPD, but not on O2 at home. Continue attempts for titration. Continue IC.     UTI/dysuria/incontinence - s/p Keflex. UA +, culture negative. Now complaining of polyuria. Will discontinue Proscar and check residuals.     Hypertension - continue bisoprolol. Tachycardia improved. Monitor.    BPH - discontinue finasteride due to polyuria. Recheck PVRs.    Decreased protein/albumin - dietician consult.    Low Vit D - 29. Continue supplementation.    Bladder - Schedule tolieting.    Bowel - meds as needed. Last BM 1/1/2018.    DVT prophylaxis - Lovenox.    Total time:  >35 minutes.  I spent greater than 50% of the time for patient care and coordination on this date, including unit/floor time, and face-to-face time with the patient as per assessment and plan above.    Samina Edwards M.D.

## 2018-01-02 NOTE — FLOWSHEET NOTE
01/02/18 1323   Events/Summary/Plan   Events/Summary/Plan 02 spot check on 2 lpm, oxygen weaned to 1.5 lpm   Education   Education Yes - Pt. / Family has been Instructed in use of Respiratory Equipment;Yes - Pt. / Family has been Instructed in use of Respiratory Medications and Adverse Reactions   Respiratory WDL   Respiratory (WDL) X   Chest Exam   Respiration 18   Pulse 77   Breath Sounds   RML Breath Sounds Diminished   RLL Breath Sounds Diminished   LLL Breath Sounds Diminished   Secretions   Cough Congested;Non Productive   Oximetry   #Pulse Oximetry (Single Determination) Yes   Oxygen   Home O2 Use Prior To Admission? No   Pulse Oximetry 91 %   O2 (LPM) 1.5   O2 Daily Delivery Respiratory  Silicone Nasal Cannula

## 2018-01-03 PROCEDURE — 97110 THERAPEUTIC EXERCISES: CPT

## 2018-01-03 PROCEDURE — 700102 HCHG RX REV CODE 250 W/ 637 OVERRIDE(OP): Performed by: PHYSICAL MEDICINE & REHABILITATION

## 2018-01-03 PROCEDURE — 97533 SENSORY INTEGRATION: CPT

## 2018-01-03 PROCEDURE — A9270 NON-COVERED ITEM OR SERVICE: HCPCS | Performed by: PHYSICAL MEDICINE & REHABILITATION

## 2018-01-03 PROCEDURE — 700111 HCHG RX REV CODE 636 W/ 250 OVERRIDE (IP): Performed by: PHYSICAL MEDICINE & REHABILITATION

## 2018-01-03 PROCEDURE — 97530 THERAPEUTIC ACTIVITIES: CPT

## 2018-01-03 PROCEDURE — 97535 SELF CARE MNGMENT TRAINING: CPT

## 2018-01-03 PROCEDURE — 700112 HCHG RX REV CODE 229: Performed by: PHYSICAL MEDICINE & REHABILITATION

## 2018-01-03 PROCEDURE — 97112 NEUROMUSCULAR REEDUCATION: CPT

## 2018-01-03 PROCEDURE — 97116 GAIT TRAINING THERAPY: CPT

## 2018-01-03 PROCEDURE — 94760 N-INVAS EAR/PLS OXIMETRY 1: CPT

## 2018-01-03 PROCEDURE — 99232 SBSQ HOSP IP/OBS MODERATE 35: CPT | Performed by: PHYSICAL MEDICINE & REHABILITATION

## 2018-01-03 PROCEDURE — 770010 HCHG ROOM/CARE - REHAB SEMI PRIVAT*

## 2018-01-03 RX ADMIN — TRAZODONE HYDROCHLORIDE 50 MG: 50 TABLET ORAL at 22:27

## 2018-01-03 RX ADMIN — ENOXAPARIN SODIUM 30 MG: 100 INJECTION SUBCUTANEOUS at 08:08

## 2018-01-03 RX ADMIN — CHOLECALCIFEROL TAB 25 MCG (1000 UNIT) 5000 UNITS: 25 TAB at 08:06

## 2018-01-03 RX ADMIN — DOCUSATE SODIUM AND SENNOSIDES 1 TABLET: 8.6; 5 TABLET, FILM COATED ORAL at 19:51

## 2018-01-03 RX ADMIN — CALCIUM CARBONATE (ANTACID) CHEW TAB 500 MG 500 MG: 500 CHEW TAB at 19:52

## 2018-01-03 RX ADMIN — DOCUSATE SODIUM 100 MG: 100 CAPSULE, LIQUID FILLED ORAL at 19:52

## 2018-01-03 RX ADMIN — ENOXAPARIN SODIUM 30 MG: 100 INJECTION SUBCUTANEOUS at 19:51

## 2018-01-03 RX ADMIN — FLUTICASONE PROPIONATE 100 MCG: 50 SPRAY, METERED NASAL at 08:07

## 2018-01-03 RX ADMIN — BISOPROLOL FUMARATE 5 MG: 5 TABLET, COATED ORAL at 08:06

## 2018-01-03 RX ADMIN — CALCIUM CARBONATE (ANTACID) CHEW TAB 500 MG 500 MG: 500 CHEW TAB at 08:06

## 2018-01-03 RX ADMIN — DOCUSATE SODIUM 100 MG: 100 CAPSULE, LIQUID FILLED ORAL at 08:06

## 2018-01-03 ASSESSMENT — PAIN SCALES - GENERAL
PAINLEVEL_OUTOF10: 0

## 2018-01-03 NOTE — FLOWSHEET NOTE
Pt was on 1.5L NC sats 92%. Placed pt on RA resting      01/03/18 1058   Events/Summary/Plan   Events/Summary/Plan O2 spot checked on RA    Education   Education Yes - Pt. / Family has been Instructed in use of Respiratory Equipment   Respiratory WDL   Respiratory (WDL) X   Chest Exam   Work Of Breathing / Effort Mild   Respiration 16   Heart Rate (Monitored) 79   Breath Sounds   Pre/Post Intervention Post Intervention Assessment   RUL Breath Sounds Clear;Diminished   RML Breath Sounds Diminished   RLL Breath Sounds Diminished   [place] Breath Sounds Clear;Diminished   LLL Breath Sounds Diminished   Oximetry   #Pulse Oximetry (Single Determination) Yes   Oxygen   Home O2 Use Prior To Admission? No   Pulse Oximetry 90 %   O2 (LPM) 0   O2 Daily Delivery Respiratory  Room Air with O2 Available   sats 90%, tolerating well. Placed pt back on 1.5L NC

## 2018-01-03 NOTE — CARE PLAN
Problem: Bowel/Gastric:  Goal: Normal bowel function is maintained or improved  Bowel sound present in all quadrants.     Problem: Respiratory:  Goal: Respiratory status will improve  Respirations even and unlabored.  Skin warm and dry.

## 2018-01-03 NOTE — CARE PLAN
Problem: Safety  Goal: Will remain free from falls    Intervention: Implement fall precautions  Appropriately uses call light to make needs known.Bed in low position.Call light and things within reach.Will continue to monitor and assess needs and safety.      Problem: Pain Management  Goal: Pain level will decrease to patient's comfort goal  Pt denies any pain.Repositioned with pillows for comfort.Will continue to monitor and assess pain and medicate as needed.    Problem: Urinary Elimination:  Goal: Ability to reestablish a normal urinary elimination pattern will improve    Intervention: Assess and monitor for signs and symptoms of urinary retention  Assisted to the bathroom, continent of bladder.Voiding adequate amount of urine.PVR .Will continue to monitor and assess for urinary retention.

## 2018-01-03 NOTE — PROGRESS NOTES
Received  Report from Amrita JUAREZ RN, and assumed care of patient.  Patient awake, tearful in w/c at bedside, c/o pain right ankle and lump in left outer breast area, right leg elevated, ice pack placed over right ankle, emotional support provided to pt, call light within reach.  Will continue to monitor with hourly and PRN rounding.

## 2018-01-03 NOTE — CARE PLAN
Problem: Grooming  Goal: STG-Within one week, patient will complete grooming  1) Individualized Goal:  Spv/set up and use of AE/AD/Compensatory strategies as needed.   2) Interventions:  OT Self Care/ADL, OT Community Reintegration, OT Neuro Re-Ed/Balance, OT Sensory Int Techniques, OT Therapeutic Activity, OT Evaluation and OT Therapeutic Exercise     Outcome: PROGRESSING AS EXPECTED  Pt requires min A for shaving task due to removal of C collar, spv/set up for all other grooming tasks.     Problem: Bathing  Goal: STG-Within one week, patient will bathe  1) Individualized Goal:  Min A and use of AE/AD/Compensatory strategies as needed.   2) Interventions:  OT Self Care/ADL, OT Community Reintegration, OT Neuro Re-Ed/Balance, OT Sensory Int Techniques, OT Therapeutic Activity, OT Evaluation and OT Therapeutic Exercise     Outcome: MET Date Met: 01/03/18  SPV/set up    Problem: Dressing  Goal: STG-Within one week, patient will dress LB  1) Individualized Goal:  Min A, min vc's for spinal precautions, and use of AE/AD/Compensatory strategies as needed.   2) Interventions:  OT Self Care/ADL, OT Community Reintegration, OT Neuro Re-Ed/Balance, OT Sensory Int Techniques, OT Therapeutic Activity, OT Evaluation and OT Therapeutic Exercise     Outcome: MET Date Met: 01/03/18  Min A    Problem: Functional Transfers  Goal: STG-Within one week, patient will  1) Individualized Goal:  Spv/set up and use of least restrictive DME.   2) Interventions:  OT Self Care/ADL, OT Community Reintegration, OT Neuro Re-Ed/Balance, OT Sensory Int Techniques, OT Therapeutic Activity, OT Evaluation and OT Therapeutic Exercise     Outcome: MET Date Met: 01/03/18  Spv/set up for tub and toilet transfers.

## 2018-01-03 NOTE — REHAB-OT IDT TEAM NOTE
Occupational Therapy   Activities of Daily Living  Eating Initial:  7 - Independent  Eating Current:  5 - Standby Prompting/Supervision or Set-up   Eating Description:  Increased time  Grooming Initial:  4 - Minimal Assistance  Grooming Current:  4 - Minimal Assistance   Grooming Description:  Increased time, Supervision for safety, Verbal cueing, Set-up of equipment (min A for stabilizing head during shaving task due to removal of c-collar, pt able to complete oral care and wash/dry hands and face with spv/set up from w/c level.)  Bathing Initial:  3 - Moderate Assistance  Bathing Current:  5 - Standby Prompting/Supervision or Set-up   Bathing Description:  Grab bar, Tub bench, Hand held shower, Increased time, Supervision for safety, Verbal cueing, Set-up of equipment (pt able to wash/dry 10/10 parts with spv/set up seated on tub bench and SBA while standing at grab bars for amy area/buttocks. )  Upper Body Dressing Initial:  4 - Minimal Assistance  Upper Body Dressing Current:  5 - Standby Prompting/Supervision or Set-up   Upper Body Dressing Description:   ( doff /don pull over shirt )  Lower Body Dressing Initial:  2 - Max Assistance  Lower Body Dressing Current:  4 - Minimal Assistance   Lower Body Dressing Description:  4 - Minimal Assistance  Toileting Initial:  1 - Total Assistance  Toileting Current:  5 - Standby Prompting/Supervision or Set-up   Toileting Description:   (supv and support of grab bars for 3/3 tasks)  Toilet Transfer Initial:  4 - Minimal Assistance  Toilet Transfer Current:  5 - Standby Prompting/Supervision or Set-up   Toilet Transfer Description:  5 - Standby Prompting/Supervision or Set-up  Tub / Shower Transfer Initial:  4 - Minimal Assistance  Tub / Shower Transfer Current:  5 - Standby Prompting/Supervision or Set-up   Tub / Shower Transfer Description:  Grab bar, Increased time, Supervision for safety, Set-up of equipment (w/c<>tub bench with SBA and use of grab bars. )  IADL:   Kitchen mobility; participating in community outing on 1/5/18.   Family Training/Education:  ADLs, UB strengthening exercises/sensory re-integration/compensatory strategies.    DME/DC Recommendations:  Grab bars, shower chair, FWW    Strengths:  Able to follow instructions, Alert and oriented, Effective communication skills, Independent PLOF, Making steady progress towards goals, Manages pain appropriately, Motivated for self care and independence, Pleasant and cooperative, Supportive family and Willingly participates in therapeutic activities  Barriers:  Decreased endurance, Generalized weakness, Poor activity tolerance and Poor balance     # of short term goals set= 4    # of short term goals met= 3   (2 new STG as of 1/3/18)       Occupational Therapy Goals           Problem: Dressing     Dates: Start: 12/26/17       Goal: STG-Within one week, patient will dress LB     Dates: Start: 01/03/18       Description: 1) Individualized Goal:  Spv/set up and use of AE/AD/compensatory strategies as needed.   2) Interventions:  OT Group Therapy, OT Self Care/ADL, OT Community Reintegration, OT Neuro Re-Ed/Balance, OT Sensory Int Techniques, OT Therapeutic Activity, OT Evaluation and OT Therapeutic Exercise               Problem: Grooming     Dates: Start: 12/26/17       Goal: STG-Within one week, patient will complete grooming     Dates: Start: 12/26/17       Description: 1) Individualized Goal:  Spv/set up and use of AE/AD/Compensatory strategies as needed.   2) Interventions:  OT Self Care/ADL, OT Community Reintegration, OT Neuro Re-Ed/Balance, OT Sensory Int Techniques, OT Therapeutic Activity, OT Evaluation and OT Therapeutic Exercise       Note:     Goal Note filed on 01/03/18 1527 by Dana Brar, OT    Goal: STG-Within one week, patient will complete grooming  Outcome: PROGRESSING AS EXPECTED  Pt requires min A for shaving task due to removal of C collar, spv/set up   for all other grooming tasks.                    Problem: IADL's     Dates: Start: 01/03/18       Goal: STG-Within one week, patient will     Dates: Start: 01/03/18       Description: 1) Individualized Goal:  Pt will participate in community outing and maintain spinal precautions throughout with spv.   2) Interventions:  OT Group Therapy, OT Self Care/ADL, OT Community Reintegration, OT Neuro Re-Ed/Balance, OT Sensory Int Techniques, OT Therapeutic Activity, OT Evaluation and OT Therapeutic Exercise               Problem: OT Long Term Goals     Dates: Start: 12/26/17       Goal: LTG-By discharge, patient will complete basic self care tasks     Dates: Start: 12/26/17       Description: 1) Individualized Goal:  Mod I and use of AE/AD/Compensatory strategies as needed.   2) Interventions:  OT Self Care/ADL, OT Community Reintegration, OT Neuro Re-Ed/Balance, OT Sensory Int Techniques, OT Therapeutic Activity, OT Evaluation and OT Therapeutic Exercise             Goal: LTG-By discharge, patient will perform bathroom transfers     Dates: Start: 12/26/17       Description: 1) Individualized Goal:  Mod I with least restrictive AE/DME.   2) Interventions:  OT Self Care/ADL, OT Community Reintegration, OT Neuro Re-Ed/Balance, OT Sensory Int Techniques, OT Therapeutic Activity, OT Evaluation and OT Therapeutic Exercise                   Section completed by:  Dana Brar, OT

## 2018-01-03 NOTE — FLOWSHEET NOTE
01/03/18 1210   Events/Summary/Plan   Events/Summary/Plan O2 checked. Placed pt on RA,tolerating well   Chest Exam   Respiration 16   Heart Rate (Monitored) 79   Oxygen   Home O2 Use Prior To Admission? No   Pulse Oximetry 90 %   O2 (LPM) 0   O2 Daily Delivery Respiratory  Room Air with O2 Available

## 2018-01-03 NOTE — PROGRESS NOTES
Received report from Amrita JUAREZ RN, and assumed care of patient.  Patient awake, calm and stable, currently positioned in w/c in gyn with PT staff for therapy.  Denies pain or discomfort at this time.  Will continue to monitor with hourly and PRN rounding.

## 2018-01-04 PROCEDURE — 94760 N-INVAS EAR/PLS OXIMETRY 1: CPT

## 2018-01-04 PROCEDURE — A9270 NON-COVERED ITEM OR SERVICE: HCPCS | Performed by: PHYSICAL MEDICINE & REHABILITATION

## 2018-01-04 PROCEDURE — 770010 HCHG ROOM/CARE - REHAB SEMI PRIVAT*

## 2018-01-04 PROCEDURE — 97112 NEUROMUSCULAR REEDUCATION: CPT

## 2018-01-04 PROCEDURE — 97535 SELF CARE MNGMENT TRAINING: CPT

## 2018-01-04 PROCEDURE — 700111 HCHG RX REV CODE 636 W/ 250 OVERRIDE (IP): Performed by: PHYSICAL MEDICINE & REHABILITATION

## 2018-01-04 PROCEDURE — 700102 HCHG RX REV CODE 250 W/ 637 OVERRIDE(OP): Performed by: PHYSICAL MEDICINE & REHABILITATION

## 2018-01-04 PROCEDURE — 97110 THERAPEUTIC EXERCISES: CPT

## 2018-01-04 PROCEDURE — 97116 GAIT TRAINING THERAPY: CPT

## 2018-01-04 PROCEDURE — 99233 SBSQ HOSP IP/OBS HIGH 50: CPT | Performed by: PHYSICAL MEDICINE & REHABILITATION

## 2018-01-04 PROCEDURE — 97530 THERAPEUTIC ACTIVITIES: CPT

## 2018-01-04 PROCEDURE — 700112 HCHG RX REV CODE 229: Performed by: PHYSICAL MEDICINE & REHABILITATION

## 2018-01-04 RX ADMIN — BISOPROLOL FUMARATE 5 MG: 5 TABLET, COATED ORAL at 08:20

## 2018-01-04 RX ADMIN — CALCIUM CARBONATE (ANTACID) CHEW TAB 500 MG 500 MG: 500 CHEW TAB at 08:20

## 2018-01-04 RX ADMIN — ENOXAPARIN SODIUM 30 MG: 100 INJECTION SUBCUTANEOUS at 08:19

## 2018-01-04 RX ADMIN — FLUTICASONE PROPIONATE 100 MCG: 50 SPRAY, METERED NASAL at 08:19

## 2018-01-04 RX ADMIN — OXYCODONE HYDROCHLORIDE 5 MG: 5 TABLET ORAL at 22:05

## 2018-01-04 RX ADMIN — DOCUSATE SODIUM AND SENNOSIDES 1 TABLET: 8.6; 5 TABLET, FILM COATED ORAL at 20:47

## 2018-01-04 RX ADMIN — DOCUSATE SODIUM 100 MG: 100 CAPSULE, LIQUID FILLED ORAL at 08:20

## 2018-01-04 RX ADMIN — CHOLECALCIFEROL TAB 25 MCG (1000 UNIT) 5000 UNITS: 25 TAB at 08:19

## 2018-01-04 RX ADMIN — DOCUSATE SODIUM 100 MG: 100 CAPSULE, LIQUID FILLED ORAL at 20:47

## 2018-01-04 RX ADMIN — CALCIUM CARBONATE (ANTACID) CHEW TAB 500 MG 500 MG: 500 CHEW TAB at 20:47

## 2018-01-04 RX ADMIN — ENOXAPARIN SODIUM 30 MG: 100 INJECTION SUBCUTANEOUS at 20:47

## 2018-01-04 ASSESSMENT — PAIN SCALES - GENERAL
PAINLEVEL_OUTOF10: 0
PAINLEVEL_OUTOF10: 0
PAINLEVEL_OUTOF10: 6
PAINLEVEL_OUTOF10: 2

## 2018-01-04 NOTE — CARE PLAN
Problem: Safety  Goal: Will remain free from injury  Pt remains free from accidental injury.Appropriately uses call light to make needs known.Bed in low position.Call light and things within reach.Will continue to monitor and assess needs and safety.    Problem: Venous Thromboembolism (VTW)/Deep Vein Thrombosis (DVT) Prevention:  Goal: Patient will participate in Venous Thrombosis (VTE)/Deep Vein Thrombosis (DVT)Prevention Measures  Scheduled lovenox 30 mg given with no adverse reaction noted.Will continue to monitor and assess.    Problem: Bowel/Gastric:  Goal: Normal bowel function is maintained or improved    Intervention: Educate patient and significant other/support system about signs and symptoms of constipation and interventions to implement  Assisted to the bathroom, continent of bowel.Scheduled medication given.[facility] 01/03.Will continue to monitor and assess for s/sx of constipation.      Problem: Pain Management  Goal: Pain level will decrease to patient's comfort goal  Pt is calm, comfortable and no sign of acute distress noted.Repositioned with pillows for comfort.Will continue to monitor and assess pain level and medicate as needed.

## 2018-01-04 NOTE — CARE PLAN
Problem: Infection  Goal: Will remain free from infection  Outcome: PROGRESSING AS EXPECTED  Patient remains free from s/s infection; afebrile. Patient's skin remains intact and free from new or accidental injury this shift.     Problem: Urinary Elimination:  Goal: Ability to reestablish a normal urinary elimination pattern will improve  Outcome: PROGRESSING AS EXPECTED  Patient voiding adequate amounts of clear, yellow urine. Denies dysuria and flank pain: afebrile.

## 2018-01-04 NOTE — CARE PLAN
Problem: Infection  Goal: Will remain free from infection  Outcome: PROGRESSING AS EXPECTED  Vital signs remain WNL, afebrile, posterior neck incision approximated, few scabs present along healing incision area, no redness, swelling or drainage present, pt wearing aspen collar at all times, pt to dining room for meals and participates in therapies, will monitor and assess for changes.    Problem: Urinary Elimination:  Goal: Ability to reestablish a normal urinary elimination pattern will improve  Outcome: PROGRESSING AS EXPECTED  Proscar d/c'd 1/2/18, to monitor PVR's 1/2/18-1/4/18, scans have been 37ml, 135ml, pt denies dysuria, encourage PO fluids and monitor for changes.

## 2018-01-04 NOTE — PROGRESS NOTES
"Rehab Progress Note     Date of Service: 1/3/2018   Chief complaint: none, follow up cervical myelopathy    Interval Events (Subjective)    Patient seen and examined in his room. His left finger swelling and pain has improved. I stopped his Proscar due to polyuria and so far his PVRs have been low. We discussed the need for a bladder medication as well, as he may have a spastic bladder due to his spinal cord injury. He denies any pain. Continues to have paresthesias in his bilateral fingers but his has improved. He wants to know when he can get off O2. Respiratory therapy does an evaluation and he's 90% on room air.     Objective:  VITAL SIGNS: /82   Pulse 92   Temp 36.6 °C (97.8 °F)   Resp 16   Ht 1.778 m (5' 10\")   Wt 76.5 kg (168 lb 10.4 oz)   SpO2 90%   BMI 24.20 kg/m²     Gen: alert, no apparent distress  HEENT: cervical collar in place  CV: regular rate and rhythm, no murmurs, mild 1+ peripheral edema at shins, Teds on  Resp: clear to ascultation bilaterally, loose non-productive cough, normal respiratory effort, on 2 L O2  GI: soft, non-tender abdomen, bowel sounds present  Msk: swelling/tenderness over left 4th PIP, improved  Neuro: notable for left arm monoparesis, improving, and bilateral numbness in fingers    No results found for this or any previous visit (from the past 72 hour(s)).    Current Facility-Administered Medications   Medication Frequency   • guaifenesin LA (MUCINEX) tablet 600 mg TID PRN   • Respiratory Care per Protocol Continuous RT   • Pharmacy Consult Request ...Pain Management Review 1 Each PRN   • oxycodone immediate-release (ROXICODONE) tablet 5 mg Q3HRS PRN   • tramadol (ULTRAM) 50 MG tablet 50 mg Q4HRS PRN   • artificial tears 1.4 % ophthalmic solution 1 Drop PRN   • hydrALAZINE (APRESOLINE) tablet 25 mg Q8HRS PRN   • mag hydrox-al hydrox-simeth (MAALOX PLUS ES or MYLANTA DS) suspension 20 mL Q2HRS PRN   • trazodone (DESYREL) tablet 50 mg QHS PRN   • sodium chloride " (OCEAN) 0.65 % nasal spray 2 Spray PRN   • calcium carbonate (TUMS) chewable tab 500 mg BID   • cyclobenzaprine (FLEXERIL) tablet 10 mg Q8HRS PRN   • docusate sodium (COLACE) capsule 100 mg BID   • ondansetron (ZOFRAN ODT) dispertab 4 mg Q4HRS PRN   • polyethylene glycol/lytes (MIRALAX) PACKET 1 Packet BID PRN   • senna-docusate (PERICOLACE or SENOKOT S) 8.6-50 MG per tablet 1 Tab Nightly   • vitamin D (cholecalciferol) tablet 5,000 Units DAILY   • bisoprolol (ZEBETA) tablet 5 mg DAILY   • enoxaparin (LOVENOX) inj 30 mg Q12HRS   • fluticasone (FLONASE) nasal spray 100 mcg DAILY       Orders Placed This Encounter   Procedures   • Diet Order     Standing Status:   Standing     Number of Occurrences:   1     Order Specific Question:   Diet:     Answer:   Regular [1]     Comments:   pt requests smaller portions please       Assessment:  Active Hospital Problems    Diagnosis   • Hypertension   • Cervical stenosis of spine   • S/P cervical spinal fusion     This patient is a 77 y.o. male admitted for acute inpatient rehabilitation with cervical myelopathy (nontraumatic), from severe cervical spinal stenosis s/p urgent C3-C5 laminectomy with C3-C4 posterior instrumented fusion by Dr. Forbes.    Dr Clements led the IDT conference on 12/28/2017:    RN:  Diet Regular diet   % Meal  Appetite 20-80%   Pain [place] mod OXy 5   Sleep Well   Bowel Continent   Bladder Continent   In's & Out's        PT:  Bed Mobility SBA   Transfers SBA   Mobility 200 feet FWW cgA  400 feet WC supervision   Stairs Min Assist with stairs And handrail   Hand numbness     02 tangles limited endurance  OT:  Eating Doing OK takes longer With adaptive equipment    Grooming Min asssit for shaving  VC For precautions    Bathing ModAssist and cueing   UB Dressing MIn   LB Dressing Mod    Toileting MOd   Shower & Transfer MIn to mod showers  Supervision for transfers      CM:  Continues to work on disposition and DME needs.      DC/Disposition:  Tentative discharge  date set for 1/11/2018 1/2/2018 - increase PT to 120 minutes and decrease OT to 60 min, 5 days per week       Medical Decision Making and Plan:    Labs reviewed. Medications reviewed.    Cervical myelopathy/non-traumatic - with left arm monoparesis and bilateral finger paresthesias. Continue full rehab program. Continue collar at all times. Spinal precautions.    Pain management - PRN oxycodone and tramadol.    Left LE swelling - DVT negative. Likely dependent edema. Sathya hose.    L 4th digit finger swelling - xrays with osteopenia. No fracture. Continue conservative treatment with ice/pain meds. Cannot use NSAIDs due to cervical surgery. Improved.    Respiratory insufficiency - unclear etiology. Possible contribution of C3/4/5 nerve root to phrenic nerve? Patient does report a history of COPD, but not on O2 at home. Continue attempts for titration. Continue IC.     UTI/dysuria/incontinence - s/p Keflex. UA +, culture negative. Now complaining of polyuria. Will discontinue Proscar and check residuals. So far low PVRs. May need bladder medication for spastic bladder.    Hypertension - continue bisoprolol. Tachycardia improved. Monitor.    BPH - discontinue finasteride due to polyuria. Recheck PVRs.    Decreased protein/albumin - dietician consult.    Low Vit D - 29. Continue supplementation.    Bladder - Schedule tolieting.    Bowel - meds as needed. Last BM 1/1/2018.    DVT prophylaxis - Lovenox.    Total time:  >25 minutes.  I spent greater than 50% of the time for patient care and coordination on this date, including unit/floor time, and face-to-face time with the patient as per assessment and plan above.    Samina Edwards M.D.

## 2018-01-04 NOTE — PROGRESS NOTES
"Rehab Progress Note     Date of Service: 1/4/2018   Chief complaint: none, follow up cervical myelopathy    Interval Events (Subjective)    Patient seen and examined in they gym. He had to urinate only once last night, which he thinks is an improvement. PVRs have been 37 and 135. Asked nurse to check once more. He is currently working on balance Wii activities with the therapist. He denies any pain. No new complaints.    Objective:  VITAL SIGNS: /59   Pulse 84   Temp 36.8 °C (98.3 °F)   Resp 16   Ht 1.778 m (5' 10\")   Wt 76.5 kg (168 lb 10.4 oz)   SpO2 98%   BMI 24.20 kg/m²     Gen: alert, no apparent distress  HEENT: cervical collar in place  CV: regular rate and rhythm, no murmurs, mild 1+ peripheral edema at shins, Teds on  Resp: clear to ascultation bilaterally, normal respiratory effort, off O2  GI: soft, non-tender abdomen, bowel sounds present  Msk: swelling/tenderness over left 4th PIP, improved  Neuro: notable for left arm monoparesis, improving, and bilateral numbness in fingers    No results found for this or any previous visit (from the past 72 hour(s)).    Current Facility-Administered Medications   Medication Frequency   • guaifenesin LA (MUCINEX) tablet 600 mg TID PRN   • Respiratory Care per Protocol Continuous RT   • Pharmacy Consult Request ...Pain Management Review 1 Each PRN   • oxycodone immediate-release (ROXICODONE) tablet 5 mg Q3HRS PRN   • tramadol (ULTRAM) 50 MG tablet 50 mg Q4HRS PRN   • artificial tears 1.4 % ophthalmic solution 1 Drop PRN   • hydrALAZINE (APRESOLINE) tablet 25 mg Q8HRS PRN   • mag hydrox-al hydrox-simeth (MAALOX PLUS ES or MYLANTA DS) suspension 20 mL Q2HRS PRN   • trazodone (DESYREL) tablet 50 mg QHS PRN   • sodium chloride (OCEAN) 0.65 % nasal spray 2 Spray PRN   • calcium carbonate (TUMS) chewable tab 500 mg BID   • cyclobenzaprine (FLEXERIL) tablet 10 mg Q8HRS PRN   • docusate sodium (COLACE) capsule 100 mg BID   • ondansetron (ZOFRAN ODT) dispertab 4 mg " Q4HRS PRN   • polyethylene glycol/lytes (MIRALAX) PACKET 1 Packet BID PRN   • senna-docusate (PERICOLACE or SENOKOT S) 8.6-50 MG per tablet 1 Tab Nightly   • vitamin D (cholecalciferol) tablet 5,000 Units DAILY   • bisoprolol (ZEBETA) tablet 5 mg DAILY   • enoxaparin (LOVENOX) inj 30 mg Q12HRS   • fluticasone (FLONASE) nasal spray 100 mcg DAILY       Orders Placed This Encounter   Procedures   • Diet Order     Standing Status:   Standing     Number of Occurrences:   1     Order Specific Question:   Diet:     Answer:   Regular [1]     Comments:   pt requests smaller portions please       Assessment:  Active Hospital Problems    Diagnosis   • Hypertension   • Cervical stenosis of spine   • S/P cervical spinal fusion     This patient is a 77 y.o. male admitted for acute inpatient rehabilitation with cervical myelopathy (nontraumatic), from severe cervical spinal stenosis s/p urgent C3-C5 laminectomy with C3-C4 posterior instrumented fusion by Dr. Forbes.    I led and attended the weekly conference today, and agree with the IDT conference documentation and plan of care as noted below.    Date of conference: 1/4/2018    RN issues: Eating 60 - 100% of meals, no pain issues, standby assist bowel, mod A for bladder with one accident in last week, Strengths: Alert and oriented, Willingly participates in therapeutic activities, Able to follow instructions and Manages pain appropriately   Barriers:   Fatigue, Generalized weakness and Limited mobility       PT: standby A for transfers/ambulation, (FWW, c/s collar, 1x 230 feet indoors, close SBA, cueing for upright posture and decreasing BUE support), standby A for WC propulsion, (1x 8 stairs and 2x 4 stairs; gait belt and Min A; max cueing for different apporaches using (1st rep) B railings, (2nd rep) L railing and FWW, and (3rd rep) lateral approach on L railing only.) DME/DC Recommendations:  1 week; FWW, additional pad set for c/s collar, railing for stairs into home;  intermittent SPV; home health PT  Strengths:  Able to follow instructions, Effective communication skills, Independent PLOF, Making steady progress towards goals, Manages pain appropriately, Motivated for self care and independence, Pleasant and cooperative and Willingly participates in therapeutic activities  Barriers:   Decreased endurance, Generalized weakness, Poor balance and Poor carryover of learning  # of short term goals set= 4  # of short term goals met= 3    OT: standby for all ADLs except for grooming - min A, and also min A for LB dressing, IADL:  Kitchen mobility; participating in community outing on 1/5/18.   Family Training/Education:  ADLs, UB strengthening exercises/sensory re-integration/compensatory strategies.    DME/DC Recommendations:  Grab bars, shower chair, FWW     Strengths:  Able to follow instructions, Alert and oriented, Effective communication skills, Independent PLOF, Making steady progress towards goals, Manages pain appropriately, Motivated for self care and independence, Pleasant and cooperative, Supportive family and Willingly participates in therapeutic activities  Barriers:  Decreased endurance, Generalized weakness, Poor activity tolerance and Poor balance     # of short term goals set= 4    # of short term goals met= 3   (2 new STG as of 1/3/18)    CM/social support: DC destination/dispostion:  Return home w/ spouse where they live in [place] in a [facility] w/ 2 steps to enter;  Family to obtain tub bench/sunction grab bars.      Referrals:  Confirm out pt vs home health      DC Needs:  Follow up with PCP and Dr. Forbes Neurosurgeon; family training prior to dc.       Barriers to discharge:   Pt remains on 02     Strengths: Pleasant, cooperative, making gains w/ therapy; supportive spouse and dtr.     Anticipated DC date: 1/9/2018, family training prior to discharge on Tuesday    Home health: PT/OT/SLP    Equip: FWW, tub bench, shower chair    Follow up: PCP, surgeon      Medical  Decision Making and Plan:    Labs reviewed. Medications reviewed.    Cervical myelopathy/non-traumatic - with left arm monoparesis and bilateral finger paresthesias. Continue full rehab program. Continue collar at all times. Spinal precautions. Has apt today with Dr. Forbes. Needs another foam liner for his collar - therapy to address.    Pain management - PRN oxycodone and tramadol.    Left LE swelling - DVT negative. Likely dependent edema. Sathya hose.    L 4th digit finger swelling - xrays with osteopenia. No fracture. Continue conservative treatment with ice/pain meds. Cannot use NSAIDs due to cervical surgery. Improved.    Respiratory insufficiency - unclear etiology. Possible contribution of C3/4/5 nerve root to phrenic nerve? Patient does report a history of COPD, but not on O2 at home. Continue attempts for titration. Continue IC. Off Oxygen as of 1/4. Will check nocturnal prior to d/c.    UTI/dysuria/incontinence - s/p Keflex. UA +, culture negative. Now complaining of polyuria. Will discontinue Proscar and check residuals. So far low PVRs. May need bladder medication for spastic bladder. Monitor.    Hypertension - continue bisoprolol. Tachycardia improved. Monitor.    BPH - discontinue finasteride due to polyuria. Recheck PVRs. 37, and 135. Check once more.    Decreased protein/albumin - dietician consult.    Low Vit D - 29. Continue supplementation.    Bladder - Schedule tolieting.    Bowel - meds as needed. Last BM 1/3/2018.    DVT prophylaxis - Lovenox.    Total time:  >35 minutes.  I spent greater than 50% of the time for patient care and coordination on this date, including unit/floor time, and face-to-face time with the patient as per assessment and plan above.    Samina Edwards M.D.

## 2018-01-04 NOTE — DISCHARGE PLANNING
CASE MANAGEMENT/ I met with pt providing an update from team conference, plan of care, and newprojected dc date of 1/9/2018 Tuesday.   H

## 2018-01-04 NOTE — FLOWSHEET NOTE
01/04/18 1045   Events/Summary/Plan   Events/Summary/Plan 02 spot check on pt after therapy.   Education   Education Yes - Pt. / Family has been Instructed in use of Respiratory Equipment   Respiratory WDL   Respiratory (WDL) X   Chest Exam   Respiration 16   Pulse 84   Oximetry   #Pulse Oximetry (Single Determination) Yes   Oxygen   Home O2 Use Prior To Admission? No   Pulse Oximetry 98 %   O2 Daily Delivery Respiratory  Room Air with O2 Available   Room Air Challenge Pass

## 2018-01-04 NOTE — REHAB-CM IDT TEAM NOTE
Case Management    DC Planning  DC destination/dispostion:  Return home w/ spouse where they live in [place] in a [facility] w/ 2 steps to enter;  Family to obtain tub bench/sunction grab bars.     Referrals:  Confirm out pt vs home health     DC Needs:  Follow up with PCP and Dr. Forbes Neurosurgeon; family training prior to dc.      Barriers to discharge:   Pt remains on 02    Strengths: Pleasant, cooperative, making gains w/ therapy; supportive spouse and dtr.     Section completed by:  JAYCEE Zamudio, CCM

## 2018-01-04 NOTE — REHAB-RESPIRATORY IDT TEAM NOTE
Respiratory Therapy   Respiratory Therapy    Pt has been on RA for over 24 hours and tolerating well.  SATS are primarily 90-92%    Section completed by:  Bekah Adrian, RRT

## 2018-01-04 NOTE — REHAB-NURSING IDT TEAM NOTE
Nursing   Nursing  Diet/Nutrition:  Regular  Medication Administration:  Whole with Liquid Wash  % consumed at meals in last 24 hours:  Consumed 60%-100% of meals per documentation.  Breakfast:  100%   Lunch:  100%  Dinner:  60%   Snack schedule:  Mid-morning and HS  Supplement:  Boost Plus  Appetite:  Good  Fluid Intake/Output in past 24 hours: In: 1560 [P.O.:1560]  Out: 350   Admit Weight:  Weight: 75.6 kg (166 lb 10.7 oz)  Weight Last 7 Days   [76.5 kg (168 lb 10.4 oz)] 76.5 kg (168 lb 10.4 oz) (12/31 1400)    Pain Issues:    Location:  --  --         Severity:  Denies   Scheduled pain medications:  None     PRN pain medications used in last 24 hours:  None   Non Pharmacologic Interventions:  emotional support, relaxation, repositioned and rest  Effectiveness of pain management plan:  good=patient states acceptable comfort after interventions    Bowel:    Bowel Assist Initial Score:  3 - Moderate Assistance  Bowel Assist Current Score:  5 - Standby Prompting/Supervision or Set-up  Bowl Accidents in last 7 days:     Last bowel movement: 01/03/18  Stool: Medium, Soft, Formed, Brown     Usual bowel pattern:  every other day  Scheduled bowel medications:  docusate sodium (COLACE) and senna-docusate (PERICOLACE or SENOKOT S)   PRN bowel medications used in last 24 hours:  None  Nursing Interventions:  Increased time, Scheduled medication, Supervision, Brief  Effectiveness of bowel program:   fair=sometimes needs prn bowel meds for constipation  Bladder:    Bladder Assist Initial Score:  3 - Moderate Assistance  Bladder Assist Current Score:  3 - Moderate Assistance  Bladder Accidents in last 7 days:  1  PVR range for past 24-48 hours:  []  ()  Medications affecting bladder:  None    Interventions:  Increased time, Supervision, Emptying of device, Urinal  Effectiveness of bladder training:  Good=regular, predictable, emptying of bladder, patient initiates time voiding    Wound:   Patient Lines/Drains/Airways  Status    Active Current Wounds     Name: Placement date: Placement time: Site: Days:    Surgical Incision  Incision N/A Neck 12/22/17   1921      12                   Interventions: Monitor and assess  Effectiveness of intervention:  wound is unchanged     Sleep/wake cycle:   Average hours slept:  Sleeps 4-6 hours without waking  Sleep medication usage:  Other Trazodone 50 mg    Patient/Family Training/Education:  Bladder Management/Training, Bowel Management/Training, Diet/Nutrition, Fall Prevention, General Self Care, Medication Management, Pain Management, Respiratory Hygiene, Safety and Wound Care    Strengths: Alert and oriented, Willingly participates in therapeutic activities, Able to follow instructions and Manages pain appropriately   Barriers:   Fatigue, Generalized weakness and Limited mobility            Nursing Problems           Problem: Bowel/Gastric:     Goal: Normal bowel function is maintained or improved           Goal: Will not experience complications related to bowel motility             Problem: Communication     Goal: The ability to communicate needs accurately and effectively will improve             Problem: Discharge Barriers/Planning     Goal: Patient's continuum of care needs will be met             Problem: Infection     Goal: Will remain free from infection             Problem: Knowledge Deficit     Goal: Knowledge of disease process/condition, treatment plan, diagnostic tests, and medications will improve           Goal: Knowledge of the prescribed therapeutic regimen will improve             Problem: Knowledge Deficit     Goal: Patient/Significant other demonstrates understanding of disease process, treatment plan, medications and discharge instructions             Problem: Mobility     Goal: Risk for activity intolerance will decrease             Problem: NUTRITION     Goal: Adequate Food and Fluid Intake           Goal: Patient maintains adequate hydration           Goal: Patient  maintains weight           Goal: Patient/Family demonstrates understanding of diet           Goal: Patient will utilize adaptive techniques to administer nutrition           Goal: Patient will verbalize dietary restrictions             Problem: Pain Management     Goal: Pain level will decrease to patient's comfort goal     Flowsheet:     Taken at 01/01/18 0745    Nurse Pain Scale 0 - 10  0 by JOHANNA Leslie.P.N.    Comfort Goal Comfort at Rest by FAM LeslieP.N.    Non Verbal Scale  Calm;Unlabored Breathing by JOHANNA Leslie.P.N.    Taken at 12/27/17 0815    Nurse Pain Scale 0 - 10  6 by Olga Garcia, L.P.N.    Comfort Goal Comfort at Rest;Comfort with Movement;Sleep Comfortably by FAM LeslieP.N.    Non Verbal Scale  Calm by JOHANNA Leslie.P.N.                  Problem: Problem: Pain     Goal: LTG - Patient will manage pain with the appropriate technique/Intervention           Goal: STG - Patient will reduce or eliminate use of analgesics           Goal: STG - pain is manageable through therapies           Goal: STG - Patient will verbalize an acceptable level of pain           Goal: STG - patients pain is managed to allow active participation in daily activities           Goal: STG - Patient will increase activity level             Problem: Psychosocial needs     Goal: Coping Skills for Life Altering Diagnosis           Goal: Spiritual needs incorporated in hospitalization           Goal: Cultural needs incorporated in hospitalization           Goal: Anxiety reduction             Problem: Respiratory:     Goal: Respiratory status will improve             Problem: SAFETY     Goal: LTG - PATIENT WILL ADHERE TO APPROPRIATE PRECAUTIONS DURING ADL'S AND TRANSFERS     Description: Type of Precautions in use:  Hip  Back  Spinal  Cardiac  Seizure  Aspiration  Other           Goal: Free from accidental injury             Problem: SKIN INTEGRITY     Goal: LTG - Patient will  be free from infection           Goal: LTG - PATIENT WILL MAINTAIN/IMPROVE SKIN INTEGRITY THROUGH PROPER SKIN CARE TECHNIQUES.           Goal: LTG - Patient will demonstrate appropriate pressure relief techniques           Goal: STG - Patient exhibits signs of wound healing.           Goal: STG - patient demonstrates pressure reduction techniques             Problem: Safety     Goal: Will remain free from injury           Goal: Will remain free from falls             Problem: Self Care Deficit     Goal: Ability to feed and maintain oral hygiene independently or with assistance           Goal: Ability to toilet independently or with assistance             Problem: Urinary Elimination:     Goal: Ability to reestablish a normal urinary elimination pattern will improve             Problem: Venous Thromboembolism (VTW)/Deep Vein Thrombosis (DVT) Prevention:     Goal: Patient will participate in Venous Thrombosis (VTE)/Deep Vein Thrombosis (DVT)Prevention Measures                  Long Term Goals:   At discharge patient will be able to function safely at home and in the community with support.    Section completed by:  Karina Weaver R.N.

## 2018-01-04 NOTE — REHAB-COLLABORATIVE ONGOING IDT TEAM NOTE
Weekly Interdisciplinary Team Conference Note    Weekly Interdisciplinary Team Conference # 2  Date:  1/4/2018    Clinicians present and reporting at team conference include the following:   MD: Samina Edwards MD   RN:  Tigist Putnam RN   PT:   Nicola Waddell, PT, DPT  OT:  Dana Brar MS OTR/L   ST:  Not Applicable  CM:  Fany Martin, LSW, CCM  REC:  Not Applicable  RT:  None  RPh:  Varun Snyder [facility]  Other:   Not Applicable  All reporting clinicians have a working knowledge of this patient's plan of care.    Recommendation:  As of 1/2/2018- Update IPOC to address therapy service delivery:   mins/day and  OT 60  Mins/day  5/7 days per week for at least 15 hours per week.    Targeted DC Date:  1/9/2018 Tuesday      Medical    Patient Active Problem List    Diagnosis Date Noted   • Cervical cord myelomalacia (CMS-HCC) 12/21/2017     Priority: High   • Hypertension 12/25/2017   • Cervical stenosis of spine 12/25/2017   • S/P cervical spinal fusion 12/25/2017   • Smoking history 12/21/2017     Results     ** No results found for the last 24 hours. **           Nursing  Diet/Nutrition:  Regular  Medication Administration:  Whole with Liquid Wash  % consumed at meals in last 24 hours:  Consumed 60%-100% of meals per documentation.  Breakfast:  100%   Lunch:  100%  Dinner:  60%   Snack schedule:  Mid-morning and HS  Supplement:  Boost Plus  Appetite:  Good  Fluid Intake/Output in past 24 hours: In: 1560 [P.O.:1560]  Out: 350   Admit Weight:  Weight: 75.6 kg (166 lb 10.7 oz)  Weight Last 7 Days   [76.5 kg (168 lb 10.4 oz)] 76.5 kg (168 lb 10.4 oz) (12/31 1400)    Pain Issues:    Location:  --  --         Severity:  Denies   Scheduled pain medications:  None     PRN pain medications used in last 24 hours:  None   Non Pharmacologic Interventions:  emotional support, relaxation, repositioned and rest  Effectiveness of pain management plan:  good=patient states acceptable comfort after interventions    Bowel:     Bowel Assist Initial Score:  3 - Moderate Assistance  Bowel Assist Current Score:  5 - Standby Prompting/Supervision or Set-up  Bowl Accidents in last 7 days:     Last bowel movement: 01/03/18  Stool: Medium, Soft, Formed, Brown     Usual bowel pattern:  every other day  Scheduled bowel medications:  docusate sodium (COLACE) and senna-docusate (PERICOLACE or SENOKOT S)   PRN bowel medications used in last 24 hours:  None  Nursing Interventions:  Increased time, Scheduled medication, Supervision, Brief  Effectiveness of bowel program:   fair=sometimes needs prn bowel meds for constipation  Bladder:    Bladder Assist Initial Score:  3 - Moderate Assistance  Bladder Assist Current Score:  3 - Moderate Assistance  Bladder Accidents in last 7 days:  1  PVR range for past 24-48 hours:  []  ()  Medications affecting bladder:  None    Interventions:  Increased time, Supervision, Emptying of device, Urinal  Effectiveness of bladder training:  Good=regular, predictable, emptying of bladder, patient initiates time voiding    Wound:   Patient Lines/Drains/Airways Status    Active Current Wounds     Name: Placement date: Placement time: Site: Days:    Surgical Incision  Incision N/A Neck 12/22/17 1921 12                   Interventions: Monitor and assess  Effectiveness of intervention:  wound is unchanged     Sleep/wake cycle:   Average hours slept:  Sleeps 4-6 hours without waking  Sleep medication usage:  Other Trazodone 50 mg    Patient/Family Training/Education:  Bladder Management/Training, Bowel Management/Training, Diet/Nutrition, Fall Prevention, General Self Care, Medication Management, Pain Management, Respiratory Hygiene, Safety and Wound Care    Strengths: Alert and oriented, Willingly participates in therapeutic activities, Able to follow instructions and Manages pain appropriately   Barriers:   Fatigue, Generalized weakness and Limited mobility            Nursing Problems           Problem:  Bowel/Gastric:     Goal: Normal bowel function is maintained or improved           Goal: Will not experience complications related to bowel motility             Problem: Communication     Goal: The ability to communicate needs accurately and effectively will improve             Problem: Discharge Barriers/Planning     Goal: Patient's continuum of care needs will be met             Problem: Infection     Goal: Will remain free from infection             Problem: Knowledge Deficit     Goal: Knowledge of disease process/condition, treatment plan, diagnostic tests, and medications will improve           Goal: Knowledge of the prescribed therapeutic regimen will improve             Problem: Knowledge Deficit     Goal: Patient/Significant other demonstrates understanding of disease process, treatment plan, medications and discharge instructions             Problem: Mobility     Goal: Risk for activity intolerance will decrease             Problem: NUTRITION     Goal: Adequate Food and Fluid Intake           Goal: Patient maintains adequate hydration           Goal: Patient maintains weight           Goal: Patient/Family demonstrates understanding of diet           Goal: Patient will utilize adaptive techniques to administer nutrition           Goal: Patient will verbalize dietary restrictions             Problem: Pain Management     Goal: Pain level will decrease to patient's comfort goal     Flowsheet:     Taken at 01/01/18 0745    Nurse Pain Scale 0 - 10  0 by Olga Garcia, L.P.N.    Comfort Goal Comfort at Rest by Olga Garcia, L.P.N.    Non Verbal Scale  Calm;Unlabored Breathing by Olga Garcia, L.P.N.    Taken at 12/27/17 0815    Nurse Pain Scale 0 - 10  6 by Olga Garcia, L.P.N.    Comfort Goal Comfort at Rest;Comfort with Movement;Sleep Comfortably by Olga Garcia, L.P.N.    Non Verbal Scale  Calm by Olga Garcia, L.P.N.                  Problem: Problem: Pain     Goal: LTG -  Patient will manage pain with the appropriate technique/Intervention           Goal: STG - Patient will reduce or eliminate use of analgesics           Goal: STG - pain is manageable through therapies           Goal: STG - Patient will verbalize an acceptable level of pain           Goal: STG - patients pain is managed to allow active participation in daily activities           Goal: STG - Patient will increase activity level             Problem: Psychosocial needs     Goal: Coping Skills for Life Altering Diagnosis           Goal: Spiritual needs incorporated in hospitalization           Goal: Cultural needs incorporated in hospitalization           Goal: Anxiety reduction             Problem: Respiratory:     Goal: Respiratory status will improve             Problem: SAFETY     Goal: LTG - PATIENT WILL ADHERE TO APPROPRIATE PRECAUTIONS DURING ADL'S AND TRANSFERS     Description: Type of Precautions in use:  Hip  Back  Spinal  Cardiac  Seizure  Aspiration  Other           Goal: Free from accidental injury             Problem: SKIN INTEGRITY     Goal: LTG - Patient will be free from infection           Goal: LTG - PATIENT WILL MAINTAIN/IMPROVE SKIN INTEGRITY THROUGH PROPER SKIN CARE TECHNIQUES.           Goal: LTG - Patient will demonstrate appropriate pressure relief techniques           Goal: STG - Patient exhibits signs of wound healing.           Goal: STG - patient demonstrates pressure reduction techniques             Problem: Safety     Goal: Will remain free from injury           Goal: Will remain free from falls             Problem: Self Care Deficit     Goal: Ability to feed and maintain oral hygiene independently or with assistance           Goal: Ability to toilet independently or with assistance             Problem: Urinary Elimination:     Goal: Ability to reestablish a normal urinary elimination pattern will improve             Problem: Venous Thromboembolism (VTW)/Deep Vein Thrombosis (DVT)  Prevention:     Goal: Patient will participate in Venous Thrombosis (VTE)/Deep Vein Thrombosis (DVT)Prevention Measures                  Long Term Goals:   At discharge patient will be able to function safely at home and in the community with support.    Section completed by:  Karina Weaver R.N.           Respiratory Therapy    Pt is using 2 lpm 02 to maintain SATS >90%.  He has refused smoking cessation and he states he has quit before on his own.    Section completed by:  Bekah Adrian, RRT       Mobility  Bed mobility:  SPV-SBA  Bed /Chair/Wheelchair Transfer Initial:  3 - Moderate Assistance  Bed /Chair/Wheelchair Transfer Current:  5 - Standby Prompting/Supervision or Set-up   Bed/Chair/Wheelchair Transfer Description:  Increased time, Supervision for safety, Verbal cueing, Set-up of equipment (bed mobility with SBA, EOB <>w/c with FWW, SBA and vc's for sequencing)  Walk Initial:  1 - Total Assistance  Walk Current:  5 - Standby Prompting/Supervision or Set-up   Walk Description:   (FWW, c/s collar, 1x 230 feet indoors, close SBA, cueing for upright posture and decreasing BUE support)  Wheelchair Initial:  4 - Minimal Assistance  Wheelchair Current:  5 - Standby Prompting/Supervision or Set-up   Wheelchair Description:   (400ft SPV using bilateral LE in 4min 40sec)  Stairs Initial:  0 - Not tested,unsafe activity  Stairs Current: 2 - Max Assistance   Stairs Description:  (1x 8 stairs and 2x 4 stairs; gait belt and Min A; max cueing for different apporaches using (1st rep) B railings, (2nd rep) L railing and FWW, and (3rd rep) lateral approach on L railing only.)  Patient/Family Training/Education:  Ongoing patient education; SO will require family training for stairs just before d/c  DME/DC Recommendations:  1 week; FWW, additional pad set for c/s collar, railing for stairs into home; intermittent SPV; home health PT  Strengths:  Able to follow instructions, Effective communication skills,  Independent PLOF, Making steady progress towards goals, Manages pain appropriately, Motivated for self care and independence, Pleasant and cooperative and Willingly participates in therapeutic activities  Barriers:   Decreased endurance, Generalized weakness, Poor balance and Poor carryover of learning  # of short term goals set= 4  # of short term goals met= 3       Physical Therapy Problems           Problem: Mobility     Dates: Start: 12/26/17       Goal: STG-Within one week, patient will ambulate community distances     Dates: Start: 01/04/18       Description: 1) Individualized goal: 300 feet with SPV, c/s collar, and FWW  2) Interventions: PT Group Therapy, PT E Stim Attended, PT Gait Training, PT Therapeutic Exercises, PT Neuro Re-Ed/Balance, PT Therapeutic Activity and PT Manual Therapy.              Goal: STG-Within one week, patient will ascend and descend four to six stairs     Dates: Start: 01/04/18       Description: 1) Individualized goal: 8 standard stairs with SBA, c/s collar, and B railings  2) Interventions: PT Group Therapy, PT E Stim Attended, PT Gait Training, PT Therapeutic Exercises, PT Neuro Re-Ed/Balance, PT Therapeutic Activity and PT Manual Therapy.             Problem: Mobility Transfers     Dates: Start: 12/26/17       Goal: STG-Within one week, patient will transfer in/out of car     Dates: Start: 12/26/17       Description: 1) Individualized goal:  with SBA, c/s collar, and FWW  2) Interventions:  PT Group Therapy, PT E Stim Attended, PT Gait Training, PT Therapeutic Exercises, PT Neuro Re-Ed/Balance, PT Therapeutic Activity and PT Manual Therapy.         Note:     Goal Note filed on 01/04/18 1151 by Alessandro Waddell, PT    Goal: STG-Within one week, patient will transfer in/out of car  Outcome: NOT MET  Will have to assess in the upcoming week                Goal: STG-Within one week, patient will transfer bed to chair     Dates: Start: 01/04/18       Description: 1)  Individualized goal: with Mod I, c/s collar, and FWW  2) Interventions: PT Group Therapy, PT E Stim Attended, PT Gait Training, PT Therapeutic Exercises, PT Neuro Re-Ed/Balance, PT Therapeutic Activity and PT Manual Therapy.                Problem: PT-Long Term Goals     Dates: Start: 12/26/17       Goal: LTG-By discharge, patient will ambulate     Dates: Start: 12/26/17       Description: 1) Individualized goal:  500 feet with Mod I, c/s collar, and FWW  2) Interventions:  PT Group Therapy, PT E Stim Attended, PT Gait Training, PT Therapeutic Exercises, PT Neuro Re-Ed/Balance, PT Therapeutic Activity and PT Manual Therapy.             Goal: LTG-By discharge, patient will transfer one surface to another     Dates: Start: 12/26/17       Description: 1) Individualized goal:  with Mod I, c/s collar, and FWW  2) Interventions: PT Group Therapy, PT E Stim Attended, PT Gait Training, PT Therapeutic Exercises, PT Neuro Re-Ed/Balance, PT Therapeutic Activity and PT Manual Therapy.             Goal: LTG-By discharge, patient will ambulate up/down 4-6 stairs     Dates: Start: 12/26/17       Description: 1) Individualized goal:  4 standard stairs with SPV, c/s collar, and FWW  2) Interventions: PT Group Therapy, PT E Stim Attended, PT Gait Training, PT Therapeutic Exercises, PT Neuro Re-Ed/Balance, PT Therapeutic Activity and PT Manual Therapy.                     Section completed by:  Alessandro Waddell, PT, DPT       Activities of Daily Living  Eating Initial:  7 - Independent  Eating Current:  5 - Standby Prompting/Supervision or Set-up   Eating Description:  Increased time  Grooming Initial:  4 - Minimal Assistance  Grooming Current:  4 - Minimal Assistance   Grooming Description:  Increased time, Supervision for safety, Verbal cueing, Set-up of equipment (min A for stabilizing head during shaving task due to removal of c-collar, pt able to complete oral care and wash/dry hands and face with spv/set up from w/c  level.)  Bathing Initial:  3 - Moderate Assistance  Bathing Current:  5 - Standby Prompting/Supervision or Set-up   Bathing Description:  Grab bar, Tub bench, Hand held shower, Increased time, Supervision for safety, Verbal cueing, Set-up of equipment (pt able to wash/dry 10/10 parts with spv/set up seated on tub bench and SBA while standing at grab bars for amy area/buttocks. )  Upper Body Dressing Initial:  4 - Minimal Assistance  Upper Body Dressing Current:  5 - Standby Prompting/Supervision or Set-up   Upper Body Dressing Description:   ( doff /don pull over shirt )  Lower Body Dressing Initial:  2 - Max Assistance  Lower Body Dressing Current:  4 - Minimal Assistance   Lower Body Dressing Description:  4 - Minimal Assistance  Toileting Initial:  1 - Total Assistance  Toileting Current:  5 - Standby Prompting/Supervision or Set-up   Toileting Description:   (supv and support of grab bars for 3/3 tasks)  Toilet Transfer Initial:  4 - Minimal Assistance  Toilet Transfer Current:  5 - Standby Prompting/Supervision or Set-up   Toilet Transfer Description:  5 - Standby Prompting/Supervision or Set-up  Tub / Shower Transfer Initial:  4 - Minimal Assistance  Tub / Shower Transfer Current:  5 - Standby Prompting/Supervision or Set-up   Tub / Shower Transfer Description:  Grab bar, Increased time, Supervision for safety, Set-up of equipment (w/c<>tub bench with SBA and use of grab bars. )  IADL:  Kitchen mobility; participating in community outing on 1/5/18.   Family Training/Education:  ADLs, UB strengthening exercises/sensory re-integration/compensatory strategies.    DME/DC Recommendations:  Grab bars, shower chair, FWW    Strengths:  Able to follow instructions, Alert and oriented, Effective communication skills, Independent PLOF, Making steady progress towards goals, Manages pain appropriately, Motivated for self care and independence, Pleasant and cooperative, Supportive family and Willingly participates in  therapeutic activities  Barriers:  Decreased endurance, Generalized weakness, Poor activity tolerance and Poor balance     # of short term goals set= 4    # of short term goals met= 3   (2 new STG as of 1/3/18)       Occupational Therapy Goals           Problem: Dressing     Dates: Start: 12/26/17       Goal: STG-Within one week, patient will dress LB     Dates: Start: 01/03/18       Description: 1) Individualized Goal:  Spv/set up and use of AE/AD/compensatory strategies as needed.   2) Interventions:  OT Group Therapy, OT Self Care/ADL, OT Community Reintegration, OT Neuro Re-Ed/Balance, OT Sensory Int Techniques, OT Therapeutic Activity, OT Evaluation and OT Therapeutic Exercise               Problem: Grooming     Dates: Start: 12/26/17       Goal: STG-Within one week, patient will complete grooming     Dates: Start: 12/26/17       Description: 1) Individualized Goal:  Spv/set up and use of AE/AD/Compensatory strategies as needed.   2) Interventions:  OT Self Care/ADL, OT Community Reintegration, OT Neuro Re-Ed/Balance, OT Sensory Int Techniques, OT Therapeutic Activity, OT Evaluation and OT Therapeutic Exercise       Note:     Goal Note filed on 01/03/18 1527 by Dana Brar, OT    Goal: STG-Within one week, patient will complete grooming  Outcome: PROGRESSING AS EXPECTED  Pt requires min A for shaving task due to removal of C collar, spv/set up   for all other grooming tasks.                   Problem: IADL's     Dates: Start: 01/03/18       Goal: STG-Within one week, patient will     Dates: Start: 01/03/18       Description: 1) Individualized Goal:  Pt will participate in community outing and maintain spinal precautions throughout with spv.   2) Interventions:  OT Group Therapy, OT Self Care/ADL, OT Community Reintegration, OT Neuro Re-Ed/Balance, OT Sensory Int Techniques, OT Therapeutic Activity, OT Evaluation and OT Therapeutic Exercise               Problem: OT Long Term Goals     Dates: Start:  "12/26/17       Goal: LTG-By discharge, patient will complete basic self care tasks     Dates: Start: 12/26/17       Description: 1) Individualized Goal:  Mod I and use of AE/AD/Compensatory strategies as needed.   2) Interventions:  OT Self Care/ADL, OT Community Reintegration, OT Neuro Re-Ed/Balance, OT Sensory Int Techniques, OT Therapeutic Activity, OT Evaluation and OT Therapeutic Exercise             Goal: LTG-By discharge, patient will perform bathroom transfers     Dates: Start: 12/26/17       Description: 1) Individualized Goal:  Mod I with least restrictive AE/DME.   2) Interventions:  OT Self Care/ADL, OT Community Reintegration, OT Neuro Re-Ed/Balance, OT Sensory Int Techniques, OT Therapeutic Activity, OT Evaluation and OT Therapeutic Exercise                   Section completed by:  Dana Brar, OT             Nutrition       Dietary Problems (Active)            There are no active problems.        Nutrition Care/ Consult For Weight Loss PTA    Assessment:    Admitting Diagnosis: Cervical stenosis of spine, S/P cervical spinal fusion, and Hypertension.    Pertinent PMH: PBH, HTN, and hyperlipidemia    Additional Information: The pt was seen today in his room, just returning from therapy and about to enjoy ice cream. The pt was noted to have wt loss PTA. The pt reports 20-30# wt loss over the previous 3 months which is planned and positive as the pt wants to 'firm up a bit'. When asked about what he eats at home, the pt says that he normally has small meals with 3 Boosts per day. PO intake has been ~50% of recent meals with 20% average today. The pt states that the portion sizes he is receiving on meal trays is too large for him - he requests smaller portions and is amendable to adding Boost Plus TID.     Appetite: \"good\"  Diet: Regular  Average PO intake x 3 days: ~50%  Labs: Albumin 3.1 (L)  Medications: Tums, Keflex, Colace, Lovenox, Pericolace, Vitamin D,   PRN Medications: Hydralazine, Zofran, " "Roxicodone, Miralax, Tramadol  IVF: None noted in MAR    Height: 5, 10\" (1.778 m)  Weight: 166# (75.6kg)  IBW: 166# (75.5kg)  BMI: 23.91    Skin: No skin breakdown noted, only surgical incision (neck)  GI: last BM on 12/26  : WNL  Vitals: /75, on 1L O2 by NC  I/Os: Reviewed.    Diagnosis: No nutritional diagnosis at this time.    Intervention/ Recommendations/POC:  1. Continue current diet, add Boost Plus TID to mimic home regimen, smaller portion sizes on meal trays per pt request.  2. Encourage adequate PO/fluid intake.  3. Nutrition rep to see regarding food prefs/ honor within dietary restrictions (if indicated)     Monitor/Evaluation: Monitor PO intake, weight, labs, medication adjustments, skin integrity, GI function, vitals, I/Os, and overall hydration status.  Adjust nutritional POC pending clinical outcomes.   RD following PRN.  Goal: Maintain adequate oral nutrient/fluid intake to promote nutrition optimization/healing.     Section completed by:  Yas Hair R.D.    REHAB-Pharmacy IDT Team Note by Devan Wong RPH at 1/3/2018  4:11 PM  Version 1 of 1    Author:  Devan Wong RPH Service:  (none) Author Type:  Pharmacist    Filed:  1/3/2018  4:11 PM Date of Service:  1/3/2018  4:11 PM Status:  Signed    :  Devan Wong RPH (Pharmacist)         Pharmacy   Pharmacy  Antibiotics/Antifungals/Antivirals:  Medication:      Active Orders     None        Route:         None  Stop Date:  N/A  Reason:   Antihypertensives/Cardiac:  Medication:    Orders (72h ago through future)    Start     Ordered    12/26/17 0900  bisoprolol (ZEBETA) tablet 5 mg  DAILY      12/25/17 1526    12/25/17 1043  hydrALAZINE (APRESOLINE) tablet 25 mg  EVERY 8 HOURS PRN      12/25/17 1043        Patient Vitals for the past 24 hrs:   BP Pulse   01/03/18 0710 - 92   01/03/18 0701 - 88   01/03/18 0630 131/82 88   01/02/18 1900 132/78 92       Anticoagulation:  Medication:  lovenox  INR:      Other key " medications:      A review of the medication list reveals no issues at this time. Patient is currently on antihypertensive(s). Recommend home blood pressure monitoring/recording if antihypertensive(s) regimen(s) continue.    Section completed by:  Devan Wong RPH[[email or web address].1]        Attribution Zamorano     WR.1 - Devan Wong RPH on 1/3/2018  4:11 PM                    DC Planning  DC destination/dispostion:  Return home w/ spouse where they live in [place] in a [facility] w/ 2 steps to enter;  Family to obtain tub bench/sunction grab bars.     Referrals:  Confirm out pt vs home health     DC Needs:  Follow up with PCP and Dr. Forbes Neurosurgeon; family training prior to dc.      Barriers to discharge:   Pt remains on 02    Strengths: Pleasant, cooperative, making gains w/ therapy; supportive spouse and dtr.     Section completed by:  JAYCEE Zamudio, [facility]   Summary:   90% on RA after shower--anticipate pt will not need 02 at home.  Will order fww for pt; family training to be done prior to dc; home health for pt/ot.   Left railing for stair entrance @ [facility].  OK date to be changed to 1/9/2018.       Physician Summary  Samina Edwards MD participated and led team conference discussion.

## 2018-01-04 NOTE — CARE PLAN
Problem: Mobility  Goal: STG-Within one week, patient will ambulate community distances  1) Individualized goal:  200 feet with SBA, c/s collar, and FWW  2) Interventions:  PT Group Therapy, PT E Stim Attended, PT Gait Training, PT Therapeutic Exercises, PT Neuro Re-Ed/Balance, PT Therapeutic Activity and PT Manual Therapy.     Outcome: MET Date Met: 01/04/18      Problem: Mobility Transfers  Goal: STG-Within one week, patient will perform bed mobility  1) Individualized goal:  with SPV and c/s collar  2) Interventions:  PT Group Therapy, PT E Stim Attended, PT Gait Training, PT Therapeutic Exercises, PT Neuro Re-Ed/Balance, PT Therapeutic Activity and PT Manual Therapy.     Outcome: MET Date Met: 01/04/18    Goal: STG-Within one week, patient will sit to stand  1) Individualized goal:  with SBA, c/s collar, and FWW  2) Interventions:  PT Group Therapy, PT E Stim Attended, PT Gait Training, PT Therapeutic Exercises, PT Neuro Re-Ed/Balance, PT Therapeutic Activity and PT Manual Therapy.     Outcome: MET Date Met: 01/04/18    Goal: STG-Within one week, patient will transfer in/out of car  1) Individualized goal:  with SBA, c/s collar, and FWW  2) Interventions:  PT Group Therapy, PT E Stim Attended, PT Gait Training, PT Therapeutic Exercises, PT Neuro Re-Ed/Balance, PT Therapeutic Activity and PT Manual Therapy.       Outcome: NOT MET  Will have to assess in the upcoming week

## 2018-01-04 NOTE — REHAB-PHARMACY IDT TEAM NOTE
Pharmacy   Pharmacy  Antibiotics/Antifungals/Antivirals:  Medication:      Active Orders     None        Route:         None  Stop Date:  N/A  Reason:   Antihypertensives/Cardiac:  Medication:    Orders (72h ago through future)    Start     Ordered    12/26/17 0900  bisoprolol (ZEBETA) tablet 5 mg  DAILY      12/25/17 1526    12/25/17 1043  hydrALAZINE (APRESOLINE) tablet 25 mg  EVERY 8 HOURS PRN      12/25/17 1043        Patient Vitals for the past 24 hrs:   BP Pulse   01/03/18 0710 - 92   01/03/18 0701 - 88   01/03/18 0630 131/82 88   01/02/18 1900 132/78 92       Anticoagulation:  Medication:  lovenox  INR:      Other key medications:      A review of the medication list reveals no issues at this time. Patient is currently on antihypertensive(s). Recommend home blood pressure monitoring/recording if antihypertensive(s) regimen(s) continue.    Section completed by:  Devan Wong RPH

## 2018-01-04 NOTE — REHAB-PT IDT TEAM NOTE
Physical Therapy   Mobility  Bed mobility:  SPV-SBA  Bed /Chair/Wheelchair Transfer Initial:  3 - Moderate Assistance  Bed /Chair/Wheelchair Transfer Current:  5 - Standby Prompting/Supervision or Set-up   Bed/Chair/Wheelchair Transfer Description:  Increased time, Supervision for safety, Verbal cueing, Set-up of equipment (bed mobility with SBA, EOB <>w/c with FWW, SBA and vc's for sequencing)  Walk Initial:  1 - Total Assistance  Walk Current:  5 - Standby Prompting/Supervision or Set-up   Walk Description:   (FWW, c/s collar, 1x 230 feet indoors, close SBA, cueing for upright posture and decreasing BUE support)  Wheelchair Initial:  4 - Minimal Assistance  Wheelchair Current:  5 - Standby Prompting/Supervision or Set-up   Wheelchair Description:   (400ft SPV using bilateral LE in 4min 40sec)  Stairs Initial:  0 - Not tested,unsafe activity  Stairs Current: 2 - Max Assistance   Stairs Description:  (1x 8 stairs and 2x 4 stairs; gait belt and Min A; max cueing for different apporaches using (1st rep) B railings, (2nd rep) L railing and FWW, and (3rd rep) lateral approach on L railing only.)  Patient/Family Training/Education:  Ongoing patient education; SO will require family training for stairs just before d/c  DME/DC Recommendations:  1 week; FWW, additional pad set for c/s collar, railing for stairs into home; intermittent SPV; home health PT  Strengths:  Able to follow instructions, Effective communication skills, Independent PLOF, Making steady progress towards goals, Manages pain appropriately, Motivated for self care and independence, Pleasant and cooperative and Willingly participates in therapeutic activities  Barriers:   Decreased endurance, Generalized weakness, Poor balance and Poor carryover of learning  # of short term goals set= 4  # of short term goals met= 3       Physical Therapy Problems           Problem: Mobility     Dates: Start: 12/26/17       Goal: STG-Within one week, patient will ambulate  community distances     Dates: Start: 01/04/18       Description: 1) Individualized goal: 300 feet with SPV, c/s collar, and FWW  2) Interventions: PT Group Therapy, PT E Stim Attended, PT Gait Training, PT Therapeutic Exercises, PT Neuro Re-Ed/Balance, PT Therapeutic Activity and PT Manual Therapy.              Goal: STG-Within one week, patient will ascend and descend four to six stairs     Dates: Start: 01/04/18       Description: 1) Individualized goal: 8 standard stairs with SBA, c/s collar, and B railings  2) Interventions: PT Group Therapy, PT E Stim Attended, PT Gait Training, PT Therapeutic Exercises, PT Neuro Re-Ed/Balance, PT Therapeutic Activity and PT Manual Therapy.             Problem: Mobility Transfers     Dates: Start: 12/26/17       Goal: STG-Within one week, patient will transfer in/out of car     Dates: Start: 12/26/17       Description: 1) Individualized goal:  with SBA, c/s collar, and FWW  2) Interventions:  PT Group Therapy, PT E Stim Attended, PT Gait Training, PT Therapeutic Exercises, PT Neuro Re-Ed/Balance, PT Therapeutic Activity and PT Manual Therapy.         Note:     Goal Note filed on 01/04/18 1151 by Alessandro Waddell, PT    Goal: STG-Within one week, patient will transfer in/out of car  Outcome: NOT MET  Will have to assess in the upcoming week                Goal: STG-Within one week, patient will transfer bed to chair     Dates: Start: 01/04/18       Description: 1) Individualized goal: with Mod I, c/s collar, and FWW  2) Interventions: PT Group Therapy, PT E Stim Attended, PT Gait Training, PT Therapeutic Exercises, PT Neuro Re-Ed/Balance, PT Therapeutic Activity and PT Manual Therapy.                Problem: PT-Long Term Goals     Dates: Start: 12/26/17       Goal: LTG-By discharge, patient will ambulate     Dates: Start: 12/26/17       Description: 1) Individualized goal:  500 feet with Mod I, c/s collar, and FWW  2) Interventions:  PT Group Therapy, PT E Stim Attended,  PT Gait Training, PT Therapeutic Exercises, PT Neuro Re-Ed/Balance, PT Therapeutic Activity and PT Manual Therapy.             Goal: LTG-By discharge, patient will transfer one surface to another     Dates: Start: 12/26/17       Description: 1) Individualized goal:  with Mod I, c/s collar, and FWW  2) Interventions: PT Group Therapy, PT E Stim Attended, PT Gait Training, PT Therapeutic Exercises, PT Neuro Re-Ed/Balance, PT Therapeutic Activity and PT Manual Therapy.             Goal: LTG-By discharge, patient will ambulate up/down 4-6 stairs     Dates: Start: 12/26/17       Description: 1) Individualized goal:  4 standard stairs with SPV, c/s collar, and FWW  2) Interventions: PT Group Therapy, PT E Stim Attended, PT Gait Training, PT Therapeutic Exercises, PT Neuro Re-Ed/Balance, PT Therapeutic Activity and PT Manual Therapy.                     Section completed by:  Alessandro Waddell, PT, DPT

## 2018-01-05 ENCOUNTER — HOME HEALTH ADMISSION (OUTPATIENT)
Dept: HOME HEALTH SERVICES | Facility: HOME HEALTHCARE | Age: 78
End: 2018-01-05
Payer: MEDICARE

## 2018-01-05 PROCEDURE — A9270 NON-COVERED ITEM OR SERVICE: HCPCS | Performed by: PHYSICAL MEDICINE & REHABILITATION

## 2018-01-05 PROCEDURE — 97537 COMMUNITY/WORK REINTEGRATION: CPT

## 2018-01-05 PROCEDURE — 770010 HCHG ROOM/CARE - REHAB SEMI PRIVAT*

## 2018-01-05 PROCEDURE — 97116 GAIT TRAINING THERAPY: CPT

## 2018-01-05 PROCEDURE — 700111 HCHG RX REV CODE 636 W/ 250 OVERRIDE (IP): Performed by: PHYSICAL MEDICINE & REHABILITATION

## 2018-01-05 PROCEDURE — 97112 NEUROMUSCULAR REEDUCATION: CPT

## 2018-01-05 PROCEDURE — 700112 HCHG RX REV CODE 229: Performed by: PHYSICAL MEDICINE & REHABILITATION

## 2018-01-05 PROCEDURE — 99232 SBSQ HOSP IP/OBS MODERATE 35: CPT | Performed by: PHYSICAL MEDICINE & REHABILITATION

## 2018-01-05 PROCEDURE — 97530 THERAPEUTIC ACTIVITIES: CPT

## 2018-01-05 PROCEDURE — 700102 HCHG RX REV CODE 250 W/ 637 OVERRIDE(OP): Performed by: PHYSICAL MEDICINE & REHABILITATION

## 2018-01-05 RX ORDER — FINASTERIDE 5 MG/1
5 TABLET, FILM COATED ORAL DAILY
Status: DISCONTINUED | OUTPATIENT
Start: 2018-01-05 | End: 2018-01-08 | Stop reason: HOSPADM

## 2018-01-05 RX ADMIN — DOCUSATE SODIUM 100 MG: 100 CAPSULE, LIQUID FILLED ORAL at 21:10

## 2018-01-05 RX ADMIN — ENOXAPARIN SODIUM 30 MG: 100 INJECTION SUBCUTANEOUS at 08:36

## 2018-01-05 RX ADMIN — FINASTERIDE 5 MG: 5 TABLET, FILM COATED ORAL at 08:35

## 2018-01-05 RX ADMIN — CALCIUM CARBONATE (ANTACID) CHEW TAB 500 MG 500 MG: 500 CHEW TAB at 08:35

## 2018-01-05 RX ADMIN — DOCUSATE SODIUM AND SENNOSIDES 1 TABLET: 8.6; 5 TABLET, FILM COATED ORAL at 21:10

## 2018-01-05 RX ADMIN — BISOPROLOL FUMARATE 5 MG: 5 TABLET, COATED ORAL at 08:36

## 2018-01-05 RX ADMIN — DOCUSATE SODIUM 100 MG: 100 CAPSULE, LIQUID FILLED ORAL at 08:36

## 2018-01-05 RX ADMIN — CHOLECALCIFEROL TAB 25 MCG (1000 UNIT) 5000 UNITS: 25 TAB at 08:35

## 2018-01-05 RX ADMIN — ENOXAPARIN SODIUM 30 MG: 100 INJECTION SUBCUTANEOUS at 21:10

## 2018-01-05 RX ADMIN — CALCIUM CARBONATE (ANTACID) CHEW TAB 500 MG 500 MG: 500 CHEW TAB at 21:10

## 2018-01-05 RX ADMIN — FLUTICASONE PROPIONATE 100 MCG: 50 SPRAY, METERED NASAL at 08:36

## 2018-01-05 ASSESSMENT — PAIN SCALES - GENERAL
PAINLEVEL_OUTOF10: 0
PAINLEVEL_OUTOF10: 0

## 2018-01-05 ASSESSMENT — GAIT ASSESSMENTS
GAIT LEVEL OF ASSIST: CONTACT GUARD ASSIST
ASSISTIVE DEVICE: FRONT WHEEL WALKER
DISTANCE (FEET): 110

## 2018-01-05 NOTE — DISCHARGE PLANNING
We are currently verifying MD acceptance of your patient. This will be resolved ASAP. If you want this escalated for a faster response please call [phone] and press option #2. Thank you for your patience.  Respectfully,   [facility]

## 2018-01-05 NOTE — PROGRESS NOTES
Received bedside shift report regarding patient and assumed care. Patient is awake, calm and stable, currently positioned in bed for comfort and safety; call light within reach. Denies any pain of discomfort at this time. Will continue to monitor.

## 2018-01-05 NOTE — PROGRESS NOTES
"Rehab Progress Note     Date of Service: 1/5/2018   Chief complaint: none, follow up cervical myelopathy    Interval Events (Subjective)    Patient seen and examined in they gym. He is going over his home accessibility pictures with the patient. He has been retaining urine so we are restarting the Proscar. Patient denies any pain.    Objective:  VITAL SIGNS: /80   Pulse 74   Temp 36.6 °C (97.9 °F)   Resp 18   Ht 1.778 m (5' 10\")   Wt 76.5 kg (168 lb 10.4 oz)   SpO2 92%   BMI 24.20 kg/m²     Gen: alert, no apparent distress  HEENT: cervical collar in place  CV: regular rate and rhythm, no murmurs  Resp: clear to ascultation bilaterally, normal respiratory effort, off O2  GI: soft, non-tender abdomen, bowel sounds present  Neuro: notable for bilateral numbness in fingers    No results found for this or any previous visit (from the past 72 hour(s)).    Current Facility-Administered Medications   Medication Frequency   • finasteride (PROSCAR) tablet 5 mg DAILY   • guaifenesin LA (MUCINEX) tablet 600 mg TID PRN   • Respiratory Care per Protocol Continuous RT   • Pharmacy Consult Request ...Pain Management Review 1 Each PRN   • oxycodone immediate-release (ROXICODONE) tablet 5 mg Q3HRS PRN   • tramadol (ULTRAM) 50 MG tablet 50 mg Q4HRS PRN   • artificial tears 1.4 % ophthalmic solution 1 Drop PRN   • hydrALAZINE (APRESOLINE) tablet 25 mg Q8HRS PRN   • mag hydrox-al hydrox-simeth (MAALOX PLUS ES or MYLANTA DS) suspension 20 mL Q2HRS PRN   • trazodone (DESYREL) tablet 50 mg QHS PRN   • sodium chloride (OCEAN) 0.65 % nasal spray 2 Spray PRN   • calcium carbonate (TUMS) chewable tab 500 mg BID   • cyclobenzaprine (FLEXERIL) tablet 10 mg Q8HRS PRN   • docusate sodium (COLACE) capsule 100 mg BID   • ondansetron (ZOFRAN ODT) dispertab 4 mg Q4HRS PRN   • polyethylene glycol/lytes (MIRALAX) PACKET 1 Packet BID PRN   • senna-docusate (PERICOLACE or SENOKOT S) 8.6-50 MG per tablet 1 Tab Nightly   • vitamin D " (cholecalciferol) tablet 5,000 Units DAILY   • bisoprolol (ZEBETA) tablet 5 mg DAILY   • enoxaparin (LOVENOX) inj 30 mg Q12HRS   • fluticasone (FLONASE) nasal spray 100 mcg DAILY       Orders Placed This Encounter   Procedures   • Diet Order     Standing Status:   Standing     Number of Occurrences:   1     Order Specific Question:   Diet:     Answer:   Regular [1]     Comments:   pt requests smaller portions please       Assessment:  Active Hospital Problems    Diagnosis   • Hypertension   • Cervical stenosis of spine   • S/P cervical spinal fusion     This patient is a 77 y.o. male admitted for acute inpatient rehabilitation with cervical myelopathy (nontraumatic), from severe cervical spinal stenosis s/p urgent C3-C5 laminectomy with C3-C4 posterior instrumented fusion by Dr. Forbes.    I led and attended the weekly conference today, and agree with the IDT conference documentation and plan of care as noted below.    Date of conference: 1/4/2018    Anticipated DC date: 1/9/2018, family training prior to discharge on Tuesday    Home health: PT/OT/SLP    Equip: FWW, tub bench, shower chair    Follow up: PCP, surgeon      Medical Decision Making and Plan:    Labs reviewed. Medications reviewed.    Cervical myelopathy/non-traumatic - with mild left arm monoparesis and bilateral finger paresthesias. Continue full rehab program. Continue collar at all times. Spinal precautions. S/p apt with Dr. Forbes.     Pain management - PRN oxycodone and tramadol.    Left LE swelling - DVT negative. Likely dependent edema. Sathya hose.    L 4th digit finger swelling - xrays with osteopenia. No fracture. Continue conservative treatment with ice/pain meds. Cannot use NSAIDs due to cervical surgery. Improved.    Respiratory insufficiency - resolved.    UTI/dysuria/incontinence - neg UA. Retained after stopping Proscar so will restart.    Hypertension - continue bisoprolol. Tachycardia improved. Monitor.    BPH - discontinue finasteride due to  polyuria. Recheck PVRs. 37, and 135, 338, 227. Restart Proscar 1.5.    Decreased protein/albumin - dietician consult.    Low Vit D - 29. Continue supplementation.    Bladder - Schedule tolieting.    Bowel - meds as needed. Last BM 1/3/2018.    DVT prophylaxis - Lovenox.    Total time:  >25 minutes.  I spent greater than 50% of the time for patient care and coordination on this date, including unit/floor time, and face-to-face time with the patient as per assessment and plan above.    Samina Edwards M.D.

## 2018-01-05 NOTE — CARE PLAN
Problem: Safety  Goal: Will remain free from injury  Pt is SBA with transfers. Aspen collar on at all times. Pt uses call light and waits for staff before getting out of bed. Call light and belongings are within reach. Will continue with hourly rounds.     Problem: Venous Thromboembolism (VTW)/Deep Vein Thrombosis (DVT) Prevention:  Goal: Patient will participate in Venous Thrombosis (VTE)/Deep Vein Thrombosis (DVT)Prevention Measures  Pt is on scheduled lovenox. No s/s of DVTs.     Problem: Pain Management  Goal: Pain level will decrease to patient's comfort goal  Pt c/o neck and headache pain, administered prn roxicodone 5 mg with good effect (see MAR/flowsheet).

## 2018-01-05 NOTE — CARE PLAN
Problem: Venous Thromboembolism (VTW)/Deep Vein Thrombosis (DVT) Prevention:  Goal: Patient will participate in Venous Thrombosis (VTE)/Deep Vein Thrombosis (DVT)Prevention Measures  Outcome: PROGRESSING AS EXPECTED  Pt is up and walking, participates in therapies, and up to dinning room for all meals.    Problem: NUTRITION  Goal: Adequate Food and Fluid Intake  Outcome: PROGRESSING AS EXPECTED  Patient free from s/s dehydration.  Oral and buccal mucosa pink and moist; conjunctiva moist; skin turgor good.  PO intake adequate.

## 2018-01-06 PROCEDURE — 700102 HCHG RX REV CODE 250 W/ 637 OVERRIDE(OP): Performed by: PHYSICAL MEDICINE & REHABILITATION

## 2018-01-06 PROCEDURE — 700111 HCHG RX REV CODE 636 W/ 250 OVERRIDE (IP): Performed by: PHYSICAL MEDICINE & REHABILITATION

## 2018-01-06 PROCEDURE — 97110 THERAPEUTIC EXERCISES: CPT

## 2018-01-06 PROCEDURE — 99232 SBSQ HOSP IP/OBS MODERATE 35: CPT | Performed by: PHYSICAL MEDICINE & REHABILITATION

## 2018-01-06 PROCEDURE — 97530 THERAPEUTIC ACTIVITIES: CPT

## 2018-01-06 PROCEDURE — 770010 HCHG ROOM/CARE - REHAB SEMI PRIVAT*

## 2018-01-06 PROCEDURE — A9270 NON-COVERED ITEM OR SERVICE: HCPCS | Performed by: PHYSICAL MEDICINE & REHABILITATION

## 2018-01-06 PROCEDURE — 97535 SELF CARE MNGMENT TRAINING: CPT

## 2018-01-06 PROCEDURE — 97533 SENSORY INTEGRATION: CPT

## 2018-01-06 PROCEDURE — 700112 HCHG RX REV CODE 229: Performed by: PHYSICAL MEDICINE & REHABILITATION

## 2018-01-06 RX ADMIN — DOCUSATE SODIUM AND SENNOSIDES 1 TABLET: 8.6; 5 TABLET, FILM COATED ORAL at 19:57

## 2018-01-06 RX ADMIN — FINASTERIDE 5 MG: 5 TABLET, FILM COATED ORAL at 07:54

## 2018-01-06 RX ADMIN — DOCUSATE SODIUM 100 MG: 100 CAPSULE, LIQUID FILLED ORAL at 07:54

## 2018-01-06 RX ADMIN — ENOXAPARIN SODIUM 30 MG: 100 INJECTION SUBCUTANEOUS at 07:53

## 2018-01-06 RX ADMIN — BISOPROLOL FUMARATE 5 MG: 5 TABLET, COATED ORAL at 07:54

## 2018-01-06 RX ADMIN — CALCIUM CARBONATE (ANTACID) CHEW TAB 500 MG 500 MG: 500 CHEW TAB at 07:53

## 2018-01-06 RX ADMIN — ENOXAPARIN SODIUM 30 MG: 100 INJECTION SUBCUTANEOUS at 19:57

## 2018-01-06 RX ADMIN — OXYCODONE HYDROCHLORIDE 5 MG: 5 TABLET ORAL at 01:10

## 2018-01-06 RX ADMIN — CALCIUM CARBONATE (ANTACID) CHEW TAB 500 MG 500 MG: 500 CHEW TAB at 19:57

## 2018-01-06 RX ADMIN — CHOLECALCIFEROL TAB 25 MCG (1000 UNIT) 5000 UNITS: 25 TAB at 07:53

## 2018-01-06 RX ADMIN — DOCUSATE SODIUM 100 MG: 100 CAPSULE, LIQUID FILLED ORAL at 19:57

## 2018-01-06 RX ADMIN — FLUTICASONE PROPIONATE 100 MCG: 50 SPRAY, METERED NASAL at 07:53

## 2018-01-06 ASSESSMENT — PAIN SCALES - GENERAL
PAINLEVEL_OUTOF10: 8
PAINLEVEL_OUTOF10: 0
PAINLEVEL_OUTOF10: 0
PAINLEVEL_OUTOF10: 4

## 2018-01-06 NOTE — CARE PLAN
Problem: SAFETY  Goal: Free from accidental injury  Outcome: PROGRESSING AS EXPECTED  Patient demonstrates good safety technique this shift.  Asks for assistance when needed and does not attempt self transfer.  Able to verbalize needs.      Problem: Urinary Elimination:  Goal: Ability to reestablish a normal urinary elimination pattern will improve  Outcome: PROGRESSING AS EXPECTED  Pt is on bladder medications and is voiding adequate amounts of clear, yellow urine. Denies dysuria and flank pain: afebrile.

## 2018-01-06 NOTE — CARE PLAN
Problem: Safety  Goal: Will remain free from injury  Pt is min assist with transfers. Aspen collar on at all times. Reminded and encouraged pt to use call light for assistance with transfers, pt verbalize understanding. Bed in low position with belongings and call light within reach. Continue with hourly rounds.     Problem: Pain Management  Goal: Pain level will decrease to patient's comfort goal  Pt denies any pain or discomfort at this time. Appears comfortable in bed. No facial grimacing noted.     Problem: Urinary Elimination:  Goal: Ability to reestablish a normal urinary elimination pattern will improve  Pt is continent of bladder. Denies dysuria or flank pain.

## 2018-01-06 NOTE — PROGRESS NOTES
Rehab Progress Note     Encounter Date: 1/6/2018    CC: Weakness    Interval Events (Subjective)  No significant change in interim. The patient is currently not complaining of pain. No fevers.    Objective:  VITAL SIGNS:   Vitals:    01/05/18 0700 01/05/18 1340 01/05/18 1838 01/06/18 0630   BP: 132/80  145/79 114/66   Pulse: 71 74 83 72   Resp: 19 18 18 17   Temp: 36.4 °C (97.6 °F) 36.6 °C (97.9 °F) 36.9 °C (98.5 °F) 36.1 °C (97 °F)   SpO2: 92% 92% 90% 93%   Weight:       Height:             Constitutional: NAD,   HENT: NCAT, oral pharynx clear, in C collar  Eyes: EOMI. PERRL  Neck: supple, no LA  Cardiovascular: RRR, nl S1/S2, no murmurs.  Thorax & Lungs: CTA bilaterally  Abdomen: soft, non-tender, non-distended, BS present.  Skin: warm/dry/intact  Back:  Full range of motion low back, without pain  Genitalia: Deferred  Rectal: deferred  Extremities: no C/C/E, distal pulses 3+ and symmetric      No results found for this or any previous visit (from the past 72 hour(s)).    Current Facility-Administered Medications   Medication Frequency   • finasteride (PROSCAR) tablet 5 mg DAILY   • guaifenesin LA (MUCINEX) tablet 600 mg TID PRN   • Respiratory Care per Protocol Continuous RT   • Pharmacy Consult Request ...Pain Management Review 1 Each PRN   • oxycodone immediate-release (ROXICODONE) tablet 5 mg Q3HRS PRN   • tramadol (ULTRAM) 50 MG tablet 50 mg Q4HRS PRN   • artificial tears 1.4 % ophthalmic solution 1 Drop PRN   • hydrALAZINE (APRESOLINE) tablet 25 mg Q8HRS PRN   • mag hydrox-al hydrox-simeth (MAALOX PLUS ES or MYLANTA DS) suspension 20 mL Q2HRS PRN   • trazodone (DESYREL) tablet 50 mg QHS PRN   • sodium chloride (OCEAN) 0.65 % nasal spray 2 Spray PRN   • calcium carbonate (TUMS) chewable tab 500 mg BID   • cyclobenzaprine (FLEXERIL) tablet 10 mg Q8HRS PRN   • docusate sodium (COLACE) capsule 100 mg BID   • ondansetron (ZOFRAN ODT) dispertab 4 mg Q4HRS PRN   • polyethylene glycol/lytes (MIRALAX) PACKET 1 Packet  BID PRN   • senna-docusate (PERICOLACE or SENOKOT S) 8.6-50 MG per tablet 1 Tab Nightly   • vitamin D (cholecalciferol) tablet 5,000 Units DAILY   • bisoprolol (ZEBETA) tablet 5 mg DAILY   • enoxaparin (LOVENOX) inj 30 mg Q12HRS   • fluticasone (FLONASE) nasal spray 100 mcg DAILY       Orders Placed This Encounter   Procedures   • Diet Order     Standing Status:   Standing     Number of Occurrences:   1     Order Specific Question:   Diet:     Answer:   Regular [1]     Comments:   pt requests smaller portions please       Intake/Output Summary (Last 24 hours) at 01/06/18 0809  Last data filed at 01/06/18 0230   Gross per 24 hour   Intake             1740 ml   Output              600 ml   Net             1140 ml         Assessment:  Active Hospital Problems    Diagnosis   • Hypertension   • Cervical stenosis of spine   • S/P cervical spinal fusion       Medical Decision Making and Plan:  Cervical myelopathy status post spinal fusion- stable    Neurogenic bladder  - In the notes were not properly recorded last 24 hours. Several times avoiding were recorded as large or medium and without a quantity. The 600 mL for the last 24 hours is not an accurate representation patient's voiding. I have initiated strict ins and outs is recorded in the flow sheet. I will check again tomorrow. Proscar was restarted by the primary team    Total time:  25 minutes.  I spent greater than 50% of the time for patient care and coordination on this date, including unit/floor time, and face-to-face time with the patient as per assessment and plan above. We discussed the importance of monitoring bladder with his condition.    Edwar Paige M.D.

## 2018-01-07 PROCEDURE — 99233 SBSQ HOSP IP/OBS HIGH 50: CPT | Performed by: PHYSICAL MEDICINE & REHABILITATION

## 2018-01-07 PROCEDURE — A9270 NON-COVERED ITEM OR SERVICE: HCPCS | Performed by: PHYSICAL MEDICINE & REHABILITATION

## 2018-01-07 PROCEDURE — 700111 HCHG RX REV CODE 636 W/ 250 OVERRIDE (IP): Performed by: PHYSICAL MEDICINE & REHABILITATION

## 2018-01-07 PROCEDURE — 97530 THERAPEUTIC ACTIVITIES: CPT

## 2018-01-07 PROCEDURE — 81001 URINALYSIS AUTO W/SCOPE: CPT

## 2018-01-07 PROCEDURE — 97116 GAIT TRAINING THERAPY: CPT

## 2018-01-07 PROCEDURE — 770010 HCHG ROOM/CARE - REHAB SEMI PRIVAT*

## 2018-01-07 PROCEDURE — 700112 HCHG RX REV CODE 229: Performed by: PHYSICAL MEDICINE & REHABILITATION

## 2018-01-07 PROCEDURE — 700102 HCHG RX REV CODE 250 W/ 637 OVERRIDE(OP): Performed by: PHYSICAL MEDICINE & REHABILITATION

## 2018-01-07 RX ORDER — TRAMADOL HYDROCHLORIDE 50 MG/1
50 TABLET ORAL EVERY 4 HOURS PRN
Status: DISCONTINUED | OUTPATIENT
Start: 2018-01-07 | End: 2018-01-08 | Stop reason: HOSPADM

## 2018-01-07 RX ORDER — GABAPENTIN 300 MG/1
300 CAPSULE ORAL EVERY EVENING
Status: DISCONTINUED | OUTPATIENT
Start: 2018-01-07 | End: 2018-01-08

## 2018-01-07 RX ORDER — ACETAMINOPHEN 500 MG
500 TABLET ORAL EVERY 6 HOURS PRN
Status: DISCONTINUED | OUTPATIENT
Start: 2018-01-07 | End: 2018-01-08 | Stop reason: HOSPADM

## 2018-01-07 RX ADMIN — ACETAMINOPHEN 500 MG: 500 TABLET ORAL at 20:22

## 2018-01-07 RX ADMIN — ENOXAPARIN SODIUM 30 MG: 100 INJECTION SUBCUTANEOUS at 08:44

## 2018-01-07 RX ADMIN — OXYCODONE HYDROCHLORIDE 5 MG: 5 TABLET ORAL at 01:05

## 2018-01-07 RX ADMIN — GABAPENTIN 300 MG: 300 CAPSULE ORAL at 20:22

## 2018-01-07 RX ADMIN — DOCUSATE SODIUM 100 MG: 100 CAPSULE, LIQUID FILLED ORAL at 08:43

## 2018-01-07 RX ADMIN — DOCUSATE SODIUM 100 MG: 100 CAPSULE, LIQUID FILLED ORAL at 20:22

## 2018-01-07 RX ADMIN — CHOLECALCIFEROL TAB 25 MCG (1000 UNIT) 5000 UNITS: 25 TAB at 08:43

## 2018-01-07 RX ADMIN — CALCIUM CARBONATE (ANTACID) CHEW TAB 500 MG 500 MG: 500 CHEW TAB at 08:43

## 2018-01-07 RX ADMIN — BISOPROLOL FUMARATE 5 MG: 5 TABLET, COATED ORAL at 08:43

## 2018-01-07 RX ADMIN — FINASTERIDE 5 MG: 5 TABLET, FILM COATED ORAL at 08:43

## 2018-01-07 RX ADMIN — CALCIUM CARBONATE (ANTACID) CHEW TAB 500 MG 500 MG: 500 CHEW TAB at 20:23

## 2018-01-07 RX ADMIN — DOCUSATE SODIUM AND SENNOSIDES 1 TABLET: 8.6; 5 TABLET, FILM COATED ORAL at 20:22

## 2018-01-07 RX ADMIN — FLUTICASONE PROPIONATE 100 MCG: 50 SPRAY, METERED NASAL at 08:44

## 2018-01-07 ASSESSMENT — PAIN SCALES - GENERAL
PAINLEVEL_OUTOF10: 7
PAINLEVEL_OUTOF10: 7
PAINLEVEL_OUTOF10: 0

## 2018-01-07 NOTE — PROGRESS NOTES
Rehab Progress Note     Encounter Date: 1/7/2018     CC: Weakness    Interval Events (Subjective)  No significant change in interim. The patient is currently not complaining of pain. No fevers.     Objective:  VITAL SIGNS:   Vitals:    01/06/18 0630 01/06/18 1430 01/06/18 1833 01/07/18 0615   BP: 114/66 119/81 105/65 150/90   Pulse: 72 84 70 64   Resp: 17 18 18 17   Temp: 36.1 °C (97 °F) 36.2 °C (97.2 °F) 36.8 °C (98.2 °F) 36.1 °C (96.9 °F)   SpO2: 93% 92% 90% 94%   Weight:       Height:             Constitutional: NAD, Examined in the patient's room.  HENT: NCAT, oral pharynx clear, in C collar  Eyes: EOMI. PERRL  Neck: supple, no LA  Cardiovascular: RRR, nl S1/S2, no murmurs.  Thorax & Lungs: CTA bilaterally  Abdomen: soft, non-tender, non-distended, BS present.  Skin: warm/dry/intact  Back:  Full range of motion low back, without pain  Genitalia: Deferred  Rectal: deferred  Extremities: no C/C/E, distal pulses 3+ and symmetric      No results found for this or any previous visit (from the past 72 hour(s)).    Current Facility-Administered Medications   Medication Frequency   • finasteride (PROSCAR) tablet 5 mg DAILY   • guaifenesin LA (MUCINEX) tablet 600 mg TID PRN   • Respiratory Care per Protocol Continuous RT   • Pharmacy Consult Request ...Pain Management Review 1 Each PRN   • oxycodone immediate-release (ROXICODONE) tablet 5 mg Q3HRS PRN   • tramadol (ULTRAM) 50 MG tablet 50 mg Q4HRS PRN   • artificial tears 1.4 % ophthalmic solution 1 Drop PRN   • hydrALAZINE (APRESOLINE) tablet 25 mg Q8HRS PRN   • mag hydrox-al hydrox-simeth (MAALOX PLUS ES or MYLANTA DS) suspension 20 mL Q2HRS PRN   • trazodone (DESYREL) tablet 50 mg QHS PRN   • sodium chloride (OCEAN) 0.65 % nasal spray 2 Spray PRN   • calcium carbonate (TUMS) chewable tab 500 mg BID   • cyclobenzaprine (FLEXERIL) tablet 10 mg Q8HRS PRN   • docusate sodium (COLACE) capsule 100 mg BID   • ondansetron (ZOFRAN ODT) dispertab 4 mg Q4HRS PRN   • polyethylene  glycol/lytes (MIRALAX) PACKET 1 Packet BID PRN   • senna-docusate (PERICOLACE or SENOKOT S) 8.6-50 MG per tablet 1 Tab Nightly   • vitamin D (cholecalciferol) tablet 5,000 Units DAILY   • bisoprolol (ZEBETA) tablet 5 mg DAILY   • enoxaparin (LOVENOX) inj 30 mg Q12HRS   • fluticasone (FLONASE) nasal spray 100 mcg DAILY        Orders Placed This Encounter   Procedures   • Diet Order     Standing Status:   Standing     Number of Occurrences:   1     Order Specific Question:   Diet:     Answer:   Regular [1]     Comments:   pt requests smaller portions please       Intake/Output Summary (Last 24 hours) at 01/07/18 0929  Last data filed at 01/07/18 0859   Gross per 24 hour   Intake             1670 ml   Output             1900 ml   Net             -230 ml         Assessment:  Active Hospital Problems    Diagnosis   • Hypertension   • Cervical stenosis of spine   • S/P cervical spinal fusion       Medical Decision Making and Plan:  Cervical myelopathy status post spinal fusion- stable    Neurogenic bladder  - following strict in and outs, patient was voiding adequately with no signs of retention. The highest urine volume was 375. 1900 mL of output overnight. Mild hematuria in the a.m. 1/7/2018 which resolved.    Pain stable, well controlled  - Discontinue oxycodone as the patient's pain is well controlled in this may be contributing to urinary retention  - Ultram changed to when necessary 9-10/10 pain as the pain is well controlled on this may be contributing to urinary retention  - Flexeril discontinued, the patient is not taking this medication days and this may contribute to fatigue which can last 72 hours.  - Tylenol 500 mg every 6 hours when necessary pain 3-8/10  - Start gabapentin 300 mg daily at bedtime which should help with pain and sleeping difficulties. Should the patient tolerated this medication and consider 300 mg 3 times a day. Consider increasing the nighttime dose to 600 mg or 900 mg if  tolerated.    Neurogenic bowel-stable, having regular bowel movements  Last BM: 01/06/18     Hypovitaminosis D  Continue supplementation    Hypertension-stable  Continue bisoprolol      Total time:  25 minutes.  I spent greater than 50% of the time for patient care and coordination on this date, including unit/floor time, and face-to-face time with the patient as per assessment and plan above. We discussed the importance of monitoring bladder with his condition.    Edwar Paige M.D.

## 2018-01-07 NOTE — CARE PLAN
Problem: Safety  Goal: Will remain free from injury    Intervention: Provide assistance with mobility  Patient reminded to use call light for assist with transfers to prevent falls.  Intervention: Collaborate with Interdisciplinary Team for safe transfer and mobilization techniques  Patient continent of bloody urine- MD aware, with continue to monitor for any changes.

## 2018-01-07 NOTE — CARE PLAN
Problem: Safety  Goal: Will remain free from injury  Pt is SBA with transfers. Pt uses call light and waits for staff before getting out of bed. Call light and belongings are within reach. Will continue with hourly rounds.     Problem: Pain Management  Goal: Pain level will decrease to patient's comfort goal  Pt c/o neck pain, administered prn roxicodone 5 mg (see MAR/flowsheet).     Problem: Urinary Elimination:  Goal: Ability to reestablish a normal urinary elimination pattern will improve  Pt is on strict I&Os. PVR throughout shift >200cc (see flowsheet). Pt is continent of bladder and uses urinal with assistance from staff. Time void initiated by staff/ double voiding. Pt denies any dysuria or flank pain. Pt started on proscar 5mg 1/6/18.

## 2018-01-08 VITALS
HEART RATE: 91 BPM | DIASTOLIC BLOOD PRESSURE: 76 MMHG | BODY MASS INDEX: 24.14 KG/M2 | TEMPERATURE: 98 F | RESPIRATION RATE: 18 BRPM | WEIGHT: 168.65 LBS | HEIGHT: 70 IN | OXYGEN SATURATION: 90 % | SYSTOLIC BLOOD PRESSURE: 133 MMHG

## 2018-01-08 PROBLEM — R31.9 HEMATURIA: Status: ACTIVE | Noted: 2018-01-08

## 2018-01-08 PROBLEM — R35.0 URINARY FREQUENCY: Status: ACTIVE | Noted: 2018-01-08

## 2018-01-08 LAB
APPEARANCE UR: CLEAR
BACTERIA #/AREA URNS HPF: NEGATIVE /HPF
BILIRUB UR QL STRIP.AUTO: NEGATIVE
COLOR UR: YELLOW
EPI CELLS #/AREA URNS HPF: NEGATIVE /HPF
GLUCOSE UR STRIP.AUTO-MCNC: NEGATIVE MG/DL
HYALINE CASTS #/AREA URNS LPF: ABNORMAL /LPF
KETONES UR STRIP.AUTO-MCNC: NEGATIVE MG/DL
LEUKOCYTE ESTERASE UR QL STRIP.AUTO: NEGATIVE
MICRO URNS: ABNORMAL
NITRITE UR QL STRIP.AUTO: NEGATIVE
PH UR STRIP.AUTO: 8.5 [PH]
PROT UR QL STRIP: NEGATIVE MG/DL
RBC # URNS HPF: >150 /HPF
RBC UR QL AUTO: ABNORMAL
SP GR UR STRIP.AUTO: 1.01
UROBILINOGEN UR STRIP.AUTO-MCNC: 0.2 MG/DL
WBC #/AREA URNS HPF: ABNORMAL /HPF

## 2018-01-08 PROCEDURE — 700112 HCHG RX REV CODE 229: Performed by: PHYSICAL MEDICINE & REHABILITATION

## 2018-01-08 PROCEDURE — 97535 SELF CARE MNGMENT TRAINING: CPT

## 2018-01-08 PROCEDURE — 97116 GAIT TRAINING THERAPY: CPT

## 2018-01-08 PROCEDURE — 99239 HOSP IP/OBS DSCHRG MGMT >30: CPT | Performed by: PHYSICAL MEDICINE & REHABILITATION

## 2018-01-08 PROCEDURE — 700102 HCHG RX REV CODE 250 W/ 637 OVERRIDE(OP): Performed by: PHYSICAL MEDICINE & REHABILITATION

## 2018-01-08 PROCEDURE — A9270 NON-COVERED ITEM OR SERVICE: HCPCS | Performed by: PHYSICAL MEDICINE & REHABILITATION

## 2018-01-08 PROCEDURE — 97530 THERAPEUTIC ACTIVITIES: CPT

## 2018-01-08 RX ORDER — PSEUDOEPHEDRINE HCL 30 MG
100 TABLET ORAL 2 TIMES DAILY
Qty: 60 CAP | COMMUNITY
Start: 2018-01-08 | End: 2018-01-11 | Stop reason: HOSPADM

## 2018-01-08 RX ORDER — BISOPROLOL FUMARATE 5 MG/1
5 TABLET, FILM COATED ORAL DAILY
Qty: 30 TAB | Refills: 0 | Status: ON HOLD | OUTPATIENT
Start: 2018-01-08 | End: 2019-07-22

## 2018-01-08 RX ORDER — FINASTERIDE 5 MG/1
5 TABLET, FILM COATED ORAL DAILY
Qty: 30 TAB | Refills: 0 | Status: SHIPPED | OUTPATIENT
Start: 2018-01-08

## 2018-01-08 RX ORDER — FLUTICASONE PROPIONATE 50 MCG
2 SPRAY, SUSPENSION (ML) NASAL DAILY
Qty: 16 G | Refills: 11 | Status: ON HOLD | OUTPATIENT
Start: 2018-01-08 | End: 2019-07-22

## 2018-01-08 RX ORDER — AMOXICILLIN 250 MG
1 CAPSULE ORAL EVERY EVENING
Qty: 30 TAB | Refills: 0 | COMMUNITY
Start: 2018-01-08 | End: 2018-01-11 | Stop reason: HOSPADM

## 2018-01-08 RX ORDER — ACETAMINOPHEN 500 MG
500 TABLET ORAL EVERY 6 HOURS PRN
Qty: 30 TAB | Refills: 0 | Status: ON HOLD | COMMUNITY
Start: 2018-01-08 | End: 2019-07-22

## 2018-01-08 RX ADMIN — CHOLECALCIFEROL TAB 25 MCG (1000 UNIT) 5000 UNITS: 25 TAB at 09:05

## 2018-01-08 RX ADMIN — BISOPROLOL FUMARATE 5 MG: 5 TABLET, COATED ORAL at 09:05

## 2018-01-08 RX ADMIN — FLUTICASONE PROPIONATE 100 MCG: 50 SPRAY, METERED NASAL at 09:07

## 2018-01-08 RX ADMIN — CALCIUM CARBONATE (ANTACID) CHEW TAB 500 MG 500 MG: 500 CHEW TAB at 09:05

## 2018-01-08 RX ADMIN — FINASTERIDE 5 MG: 5 TABLET, FILM COATED ORAL at 09:05

## 2018-01-08 RX ADMIN — DOCUSATE SODIUM 100 MG: 100 CAPSULE, LIQUID FILLED ORAL at 09:05

## 2018-01-08 ASSESSMENT — ACTIVITIES OF DAILY LIVING (ADL)
SHOWER_TRANSFER_LEVEL_OF_ASSIST: ABLE TO COMPLETE SHOWER TRANSFER WITHOUT ASSIST
TOILETING_LEVEL_OF_ASSIST: ABLE TO COMPLETE TOILETING WITHOUT ASSIST
TOILET_TRANSFER_LEVEL_OF_ASSIST: ABLE TO COMPLETE TOILET TRANSFER WITHOUT ASSIST

## 2018-01-08 ASSESSMENT — GAIT ASSESSMENTS
DISTANCE (FEET): 48
DISTANCE (FEET): 220
GAIT LEVEL OF ASSIST: SUPERVISED
GAIT LEVEL OF ASSIST: MINIMAL ASSIST
ASSISTIVE DEVICE: PARALLEL BARS
ASSISTIVE DEVICE: FRONT WHEEL WALKER

## 2018-01-08 ASSESSMENT — LIFESTYLE VARIABLES: EVER_SMOKED: YES

## 2018-01-08 ASSESSMENT — PAIN SCALES - GENERAL: PAINLEVEL_OUTOF10: 0

## 2018-01-08 NOTE — CARE PLAN
Problem: Discharge Barriers/Planning  Goal: Patient's continuum of care needs will be met  Outcome: MET Date Met: 01/08/18  Patient is to be discharged this afternoon with all instructions including nursing, therapy, medication, and physician instructions. All questions to be answered.

## 2018-01-08 NOTE — DISCHARGE INSTRUCTIONS
[facility] NURSING DISCHARGE INSTRUCTIONS    Blood Pressure : 133/76  Weight: 76.5 kg (168 lb 10.4 oz)  Nursing recommendations for Bartolo Garcia at time of discharge are as follows:  Client verbalized understanding of all discharge instructions and prescriptions.     Review all your home medications and newly ordered medications with your doctor and/or pharmacist. Follow medication instructions as directed by your doctor and/or pharmacist.    Pain Management:   Discharge Pain Medication Instructions:  Comfort Goal: 0, Comfort at Rest, Comfort with Movement, Sleep Comfortably  Notify your primary care provider if pain is unrelieved with these measures, if the pain is new, or increased in intensity.    Discharge Skin Characteristics: Warm, Dry  Discharge Skin Exam: Other (Comments) (surgical incision)    Skin / Wound Care Instructions: Please contact your primary care physician for any change in skin integrity.     If You Have Surgical Incisions / Wounds:  Monitor surgical site(s) for signs of increased swelling, redness or symptoms of drainage from the site or fever as this could indicate signs and symptoms of infection. If these symptoms are noted, notifiy your primary care provider.      Discharge Safety Instructions: Should Have ADULT SUPERVISION     Discharge Safety Concerns: Unsteady Gait  The interdisciplinary team has made recommendation that you should have adult supervision in the house due to unsteady gait  Anti-embolic stockings are required during the day and off at night to increase circulation to the lower extremities.    Discharge Diet: Regular     Discharge Liquids: Thin  Discharge Bowel Function: Continent  Please contact your primary care physician for any changes in bowel habits.  Discharge Bowel Program: general  Discharge Bladder Function: Continent  Discharge Urinary Devices: None      Nursing Discharge Plan:   Diet Plan: Discussed  Activity Level: Discussed  Smoking  Cessation Offered: Patient Counseled  Confirmed Follow up Appointment: Appointment Scheduled  Confirmed Symptoms Management: Discussed  Medication Reconciliation Updated: Yes  Influenza Vaccine Indication: Not indicated: Previously immunized this influenza season and > 8 years of age    Case Management Discharge Instructions:   Discharge Location: Home with Home Health  Agency Name/Address/Phone: [facility]  for nursing, physical therapy, and occupational therapy.  They will call you to schedule follow up home visits.   Home Health:    Outpatient Services:    DME Provider/Phone: [facility]  [phone]  will deliver equipment to you prior to discharge from [facility].   Medical Equipment Ordered: Front Wheel Walker, Wheelchair  Prescription Faxed to:        Discharge Medication Instructions:  Below are the medications your physician expects you to take upon discharge:    Depression / Suicide Risk    As you are discharged from this [facility], it is important to learn how to keep safe from harming yourself.    Recognize the warning signs:  · Abrupt changes in personality, positive or negative- including increase in energy   · Giving away possessions  · Change in eating patterns- significant weight changes-  positive or negative  · Change in sleeping patterns- unable to sleep or sleeping all the time   · Unwillingness or inability to communicate  · Depression  · Unusual sadness, discouragement and loneliness  · Talk of wanting to die  · Neglect of personal appearance   · Rebelliousness- reckless behavior  · Withdrawal from people/activities they love  · Confusion- inability to concentrate     If you or a loved one observes any of these behaviors or has concerns about self-harm, here's what you can do:  · Talk about it- your feelings and reasons for harming yourself  · Remove any means that you might use to hurt yourself (examples: pills, rope, extension cords,  firearm)  · Get professional help from the community (Mental Health, Substance Abuse, psychological counseling)  · Do not be alone:Call your Safe Contact- someone whom you trust who will be there for you.  · Call your local CRISIS HOTLINE 418-3514 or 075-126-8245  · Call your local Children's Mobile Crisis Response Team [place] (077) 698-9886 or www.[employer]  · Call the toll free National Suicide Prevention Hotlines   · National Suicide Prevention Lifeline 474-324-SVUE (6874)  · [employer] Hope Line Network 800-SUICIDE (696-4236)      Fall Prevention in the Home   Falls can cause injuries. They can happen to people of all ages. There are many things you can do to make your home safe and to help prevent falls.   WHAT CAN I DO ON THE OUTSIDE OF MY HOME?  · Regularly fix the edges of walkways and driveways and fix any cracks.  · Remove anything that might make you trip as you walk through a door, such as a raised step or threshold.  · Trim any bushes or trees on the path to your home.  · Use bright outdoor lighting.  · Clear any walking paths of anything that might make someone trip, such as rocks or tools.  · Regularly check to see if handrails are loose or broken. Make sure that both sides of any steps have handrails.  · Any raised decks and porches should have guardrails on the edges.  · Have any leaves, snow, or ice cleared regularly.  · Use sand or salt on walking paths during winter.  · Clean up any spills in your garage right away. This includes oil or grease spills.  WHAT CAN I DO IN THE BATHROOM?   · Use night lights.  · Install grab bars by the toilet and in the tub and shower. Do not use towel bars as grab bars.  · Use non-skid mats or decals in the tub or shower.  · If you need to sit down in the shower, use a plastic, non-slip stool.  · Keep the floor dry. Clean up any water that spills on the floor as soon as it happens.  · Remove soap buildup in the tub or shower regularly.  · Attach bath  mats securely with double-sided non-slip rug tape.  · Do not have throw rugs and other things on the floor that can make you trip.  WHAT CAN I DO IN THE BEDROOM?  · Use night lights.  · Make sure that you have a light by your bed that is easy to reach.  · Do not use any sheets or blankets that are too big for your bed. They should not hang down onto the floor.  · Have a firm chair that has side arms. You can use this for support while you get dressed.  · Do not have throw rugs and other things on the floor that can make you trip.  WHAT CAN I DO IN THE KITCHEN?  · Clean up any spills right away.  · Avoid walking on wet floors.  · Keep items that you use a lot in easy-to-reach places.  · If you need to reach something above you, use a strong step stool that has a grab bar.  · Keep electrical cords out of the way.  · Do not use floor polish or wax that makes floors slippery. If you must use wax, use non-skid floor wax.  · Do not have throw rugs and other things on the floor that can make you trip.  WHAT CAN I DO WITH MY STAIRS?  · Do not leave any items on the stairs.  · Make sure that there are handrails on both sides of the stairs and use them. Fix handrails that are broken or loose. Make sure that handrails are as long as the stairways.  · Check any carpeting to make sure that it is firmly attached to the stairs. Fix any carpet that is loose or worn.  · Avoid having throw rugs at the top or bottom of the stairs. If you do have throw rugs, attach them to the floor with carpet tape.  · Make sure that you have a light switch at the top of the stairs and the bottom of the stairs. If you do not have them, ask someone to add them for you.  WHAT ELSE CAN I DO TO HELP PREVENT FALLS?  · Wear shoes that:  ¨ Do not have high heels.  ¨ Have rubber bottoms.  ¨ Are comfortable and fit you well.  ¨ Are closed at the toe. Do not wear sandals.  · If you use a stepladder:  ¨ Make sure that it is fully opened. Do not climb a closed  stepladder.  ¨ Make sure that both sides of the stepladder are locked into place.  ¨ Ask someone to hold it for you, if possible.  · Clearly feliciano and make sure that you can see:  ¨ Any grab bars or handrails.  ¨ First and last steps.  ¨ Where the edge of each step is.  · Use tools that help you move around (mobility aids) if they are needed. These include:  ¨ Canes.  ¨ Walkers.  ¨ Scooters.  ¨ Crutches.  · Turn on the lights when you go into a dark area. Replace any light bulbs as soon as they burn out.  · Set up your furniture so you have a clear path. Avoid moving your furniture around.  · If any of your floors are uneven, fix them.  · If there are any pets around you, be aware of where they are.  · Review your medicines with your doctor. Some medicines can make you feel dizzy. This can increase your chance of falling.  Ask your doctor what other things that you can do to help prevent falls.     This information is not intended to replace advice given to you by your health care provider. Make sure you discuss any questions you have with your health care provider.     Document Released: 10/14/2010 Document Revised: 05/03/2016 Document Reviewed: 01/22/2016  [employer] Interactive Patient Education ©2016 [employer]      How to Take Your Blood Pressure  HOW DO I GET A BLOOD PRESSURE MACHINE?  · You can buy an electronic home blood pressure machine at your local pharmacy. Insurance will sometimes cover the cost if you have a prescription.  · Ask your doctor what type of machine is best for you. There are different machines for your arm and your wrist.  · If you decide to buy a machine to check your blood pressure on your arm, first check the size of your arm so you can buy the right size cuff. To check the size of your arm:    ¨ Use a measuring tape that shows both inches and centimeters.    ¨ Wrap the measuring tape around the upper-middle part of your arm. You may need someone to help you measure.    ¨ Write down  "your arm measurement in both inches and centimeters.    · To measure your blood pressure correctly, it is important to have the right size cuff.    ¨ If your arm is up to 13 inches (up to 34 centimeters), get an adult cuff size.  ¨ If your arm is 13 to 17 inches (35 to 44 centimeters), get a large adult cuff size.    ¨  If your arm is 17 to 20 inches (45 to 52 centimeters), get an adult thigh cuff.    WHAT DO THE NUMBERS MEAN?   · There are two numbers that make up your blood pressure. For example: 120/80.  ¨ The first number (120 in our example) is called the \"systolic pressure.\" It is a measure of the pressure in your blood vessels when your heart is pumping blood.  ¨ The second number (80 in our example) is called the \"diastolic pressure.\" It is a measure of the pressure in your blood vessels when your heart is resting between beats.  · Your doctor will tell you what your blood pressure should be.  WHAT SHOULD I DO BEFORE I CHECK MY BLOOD PRESSURE?   · Try to rest or relax for at least 30 minutes before you check your blood pressure.  · Do not smoke.  · Do not have any drinks with caffeine, such as:  ¨ Soda.  ¨ Coffee.  ¨ Tea.  · Check your blood pressure in a quiet room.  · Sit down and stretch out your arm on a table. Keep your arm at about the level of your heart. Let your arm relax.  · Make sure that your legs are not crossed.  HOW DO I CHECK MY BLOOD PRESSURE?  · Follow the directions that came with your machine.  · Make sure you remove any tight-fitting clothing from your arm or wrist. Wrap the cuff around your upper arm or wrist. You should be able to fit a finger between the cuff and your arm. If you cannot fit a finger between the cuff and your arm, it is too tight and should be removed and rewrapped.  · Some units require you to manually pump up the arm cuff.  · Automatic units inflate the cuff when you press a button.  · Cuff deflation is automatic in both models.  · After the cuff is inflated, the " unit measures your blood pressure and pulse. The readings are shown on a monitor. Hold still and breathe normally while the cuff is inflated.  · Getting a reading takes less than a minute.  · Some models store readings in a memory. Some provide a printout of readings. If your machine does not store your readings, keep a written record.  · Take readings with you to your next visit with your doctor.     This information is not intended to replace advice given to you by your health care provider. Make sure you discuss any questions you have with your health care provider.     Document Released: 11/30/2009 Document Revised: 01/08/2016 Document Reviewed: 02/12/2015  [employer] Interactive Patient Education ©2016 [employer].      How to Take a Pulse  Your pulse is the increase in pressure inside the blood vessels that carry blood from your heart to the rest of your body (arteries). Every time your heart beats, you can feel your pulse in an artery near the surface of your skin.  You can easily feel your pulse in the artery in your wrist (radial artery) and in the artery in your neck (carotid artery). Learning to take your pulse can tell you how fast your heart is beating and whether it has a normal rhythm. You can also tell whether your heart is beating strongly or weakly.  WHAT YOU NEED TO KNOW ABOUT PULSE RATES   Your pulse is the same as your heart rate. Both are measured in beats per minute (bpm). A normal resting heart rate varies depending on a person's age.   · Infants under 1 year of age: Normal heart rate of 100-160 bpm.  · Children 1-2 years of age: Normal heart rate of  bpm.  · Children 2-5 years of age: Normal heart rate of  bpm.  · Children 6-12 years of age: Normal heart rate of  bpm.  · Everyone over 12 years of age: Normal heart rate of  bpm.  There can be a lot of variation in your pulse. It can be different depending on the time of day or the amount of exercise you get. It changes  with your fitness level. Many things can change the speed and regularity of your pulse. These include:  · Exercise.  · Fever.  · Stress.  · Heart problems.  · Poor circulation.  · Medicines.  HOW TO TAKE YOUR PULSE  To take your pulse, all you need is a digital stopwatch or a clock or watch with a second hand. The best time to measure your resting pulse is in the morning before you start moving around. Take it as soon as you wake up or after resting for about 10 minutes. There are no firm rules about how often to check your pulse. In general, it is a good idea to check your pulse at least once a month. Measuring your pulse is a good way to check your heart health.  Checking your pulse before and after exercise can tell you if you are getting the right amount of exercise. This is called finding your target heart rate. Your target heart rate depends on your age, fitness, and health. Ask your health care provider what is a safe target heart rate for you during exercise.  Radial Pulse  To check the pulse in your radial artery:  2. Turn one hand palm up and relax your arm.  3. Place the first two fingers of your opposite hand gently over your wrist, just below the base of your thumb.  4. Place your fingertips just inside the bone that runs along the outside of your arm.  5. Slowly increase pressure until you feel a pulsing beneath your fingers. You may need to move your fingers slightly.  6. Do not press too hard. Too much pressure may cut off blood supply.  7. Count how many pulse beats you feel in 1 minute. You can also count for 30 seconds and double the number.  8. Pay attention to the rhythm of the pulse. It should be steady and even.  Carotid Pulse  To check the pulse in your carotid artery:  1. Place two fingers just to one side of your Daniel's apple so that you feel a pulsing beneath your fingers.  2. Do not press too hard. Too much pressure may cut off blood supply and can make you dizzy.  3. Count how many pulse  beats you feel in 1 minute. You can also count for 30 seconds and double the number.  4. Pay attention to the rhythm of the pulse. It should be steady and even.  SEEK MEDICAL CARE IF:   · Your pulse is too slow or too fast.  · Your pulse is weak or hard to find.  · You have skipped beats or extra beats.  · Your pulse has an irregular rhythm.  · You have an abnormal pulse along with dizziness, fatigue, or shortness of breath.     This information is not intended to replace advice given to you by your health care provider. Make sure you discuss any questions you have with your health care provider.     Document Released: 06/23/2004 Document Revised: 01/08/2016 Document Reviewed: 11/21/2014  [employer] Interactive Patient Education ©2016 [employer]            Physical Therapy Discharge Instructions for Bartolo Garcia    1/8/2018    Level of Assist Required for Ambulation: Supervision on Flat Surfaces, Supervision on Curbs, Assist for Balance on Stairs  Distance Patient May Ambulate: 200 feet indoors as fatigue allows  Device Recommended for Ambulation: Front-Wheeled Walker  Level of Assist Required to Propel Wheelchair: Requires No Assist  Level of Assist Required for Transfers: Requires No Assist  Device Recommended for Transfers: Front-Wheeled Walker  Home Exercise Program: None Issued  Prosthesis / Orthosis Recommendation / Location:  (Neck brace)    Soren, it was a pleasure working with you.  Please remember to wear your brace as instructed and follow your spinal precautions.  Please have someone with you AND use your walker when you are standing, walking, getting up/down, going up or down a step, etc.  When using stairs or if you need extra support at any time, please have someone with their hands on you for balance as you use the new Left railing at the stairs in the garage.  Please continue with your therapies and we will miss you!  --Nicola    Occupational Therapy Discharge Instructions for Bartolo MURPHY  Beard    1/8/2018    Level of Assist Required for Eating: Able to Complete Eating without Assist  Level of Assist Required for Grooming: Able to Complete Grooming without Assist  Level of Assist Required for Dressing: Able to Complete Dressing without Assist (some help with C-collar management)  Level of Assist Required for Toileting: Able to Complete Toileting without Assist  Level of Assist Required for Toilet Transfer: Able to Complete Toilet Transfer without Assist  Equipment for Toilet Transfer: Grab Bars by Toilet  Level of Assist Required for Bathing: Able to Complete Bathing without Assist  Equipment for Bathing: Shower Chair, Grab Bars in Tub / Shower  Level of Assist Required for Shower Transfer: Able to Complete Shower Transfer without Assist  Equipment for Shower Transfer: Grab Bars in Tub / Shower, Shower Chair  Level of Assist Required for Home Mgmt: Able to Complete Home Management without Assist  Level of Assist Required for Meal Prep: Able to Complete Meal Preparation without Assist  Driving: May not Drive, Please Contact Physician for Further Information  Home Exercise Program: Refer to Home Exercise Program Handout for Details    Soren, It has been a pleasure working with you! Be safe, keep working on your hand exercises, and remember to pace yourself! Continue to wear your brace and follow your spinal precautions. We will miss you!     Dana

## 2018-01-08 NOTE — DISCHARGE PLANNING
Met with pt and spouse Karina.   They are receptive to dc today.   Appt made with Chata MITCHELL with Dr. Sara Baptiste in [place].   Reviewed all dc instructions to include home health for PT/OT/RN, front wheeled walker ordered, and follow up with Neurosurgeon/PCP/Urology.   Spouse to transport home.

## 2018-01-08 NOTE — DISCHARGE PLANNING
"  Case Management Discharge Instructions for Bartolo \"Soren\" Garcia  Discharge Date:  Monday 1/8/2018        Discharge Location: Home with Spouse to [place], [place].     Post Acute Services:   [facility]  for nursing, physical therapy, and occupational therapy.  They will call you to schedule follow up home visits.       Durable Medical Equipment:     [facility] and  [phone] will deliver equipment to you prior to discharge from [facility].       Follow-up Information:   · Tacos Forbes M.D.  [address] [facility] [place], [address] [address] [phone]  Appointment is on 2/2/2018 Friday @ 3:00pm.      · Holden Easley M.D. Urology  [address] [facility] [id number] [phone]  Please call to [facility] follow up appointment.      · Sara Baptiste M.D/ Chata MITCHELL  [address] [facility] [id number] [phone]  Appointment is on 1/26/2018 Friday @ 1:1pm with Chata Perez within Dr. Sara Baptiste's office. Records have been sent to office for continuity of care.          "

## 2018-01-08 NOTE — DISCHARGE SUMMARY
Rehab Discharge Note  Admission 12/25/2017  Discharge 1/8/2018    Admission Diagnosis:   Active Hospital Problems    Diagnosis   • Cervical cord myelomalacia (CMS-HCC)   • Hematuria   • Urinary frequency   • Hypertension   • Cervical stenosis of spine   • S/P cervical spinal fusion       Discharge Diagnosis:  Active Hospital Problems    Diagnosis   • Cervical cord myelomalacia (CMS-HCC)   • Hematuria   • Urinary frequency   • Hypertension   • Cervical stenosis of spine   • S/P cervical spinal fusion       HPI prior to Rehab  Patient is a 77 y.o. year-old male with a past medical history significant for hypertension and hyperlipidemia admitted to [facility] on 12/21/2017 3:57 PM with symptoms of cervical myelopathy for urgent decompression and fusion by Dr. Forbes.     The patient had reportedly come to see Dr. Forbes in outpatient clinic on December 21 due to a month long history of neck pain, progressive numbness, and weakness in his bilateral hands. He also developed difficulty walking. He was noted to be dropping things and having difficulty with gait. He progressed to using a front wheeled walker for ambulation.     The images of an outside MRI of the cervical spine dated December 21, 2017 showed severe spinal stenosis at the C3-C4 level with associated focal T2 cord signal abnormality at that level.     On December 22, 2017, the patient underwent C3-C5 laminectomy with C3-C4 posterior instrumented fusion by Dr. Forbes. Intraoperatively there was notation of severe facet arthropathy at the left C3-4 facet joint and left C4-5 facet joint. There is notation of good decompression of the thecal sac which remained also towel.     Post-operatively the patient had improved left upper extremity strength, and is wearing a cervical collar at all times. He is to complete a course of Keflex for a week per internal medicine - unclear reason, presumably for UTI as patient is complaining of dysuria.     Patient was  "evaluated by Rehab Medicine physician and Physical Therapy and Occupational Therapy and determined to be appropriate for acute inpatient rehab and was transferred to [facility] on 2017.    Rehab Hospital Course    Patient had an excellent functional recovery during his rehab stay. He continued to have some left hand/arm mild weakness, with bilateral hand paresthesias. His ataxic gait improved as well. He had follow up appointment with his surgeon and is to continue his collar at all times, except for hygiene. His pain was well controlled on tylenol. He had pain and swelling of his left 4th finger PIP with negative xrays for fracture. This resolved with conservative treatment. He had some swelling on the left lower extremity which was negative for DVT and he was continued on Lovenox. He had polyuria and failed a trial of stopping his Proscar which caused retention. His Proscar was restarted and he remained with frequency and urgency. On two days prior to discharge he had jacoby hematuria with clot that resolved. Recommend outpatient follow up with his urologist for the hematuria and frequency. 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 He was moving his bowels without any issues.    Discharge PE  /76   Pulse 91   Temp 36.7 °C (98 °F)   Resp 18   Ht 1.778 m (5' 10\")   Wt 76.5 kg (168 lb 10.4 oz)   SpO2 90%   BMI 24.20 kg/m²   Gen: alert, no apparent distress  HEENT: cervical collar in place  CV: regular rate and rhythm, no murmurs  Resp: clear to ascultation bilaterally, normal respiratory effort, off O2  GI: soft, non-tender abdomen, bowel sounds present  Neuro: notable for bilateral numbness in fingers     Functional Status at Discharge  Eatin - Modified Independent  Eating Description:  Increased time  Groomin - Standby Prompting/Supervision or Set-up  Grooming Description:  Increased time, " Supervision for safety  Bathin - Standby Prompting/Supervision or Set-up  Bathing Description:  Grab bar, Tub bench, Hand held shower, Increased time, Supervision for safety, Set-up of equipment, Verbal cueing (pt able to wash/dry 10/10 parts with SBA and use of grab bars while standing.)  Upper Body Dressin - Minimal Assistance  Upper Body Dressing Description:  Increased time, Verbal cueing, Requires assistance with closures  Lower Body Dressin - Standby Prompting/Supervsion or Set-up  Lower Body Dressing Description:  5 - Standby Prompting/Supervsion or Set-up     Walk:  5 - Standby Prompting/Supervision or Set-up  Distance Walked:  Walks a minimum of 150 feet  Walk Description:   (SBA-SPV with FWW; cueing for path finding, upright posture, and increased R more than L foot clearance during swing; 150 ft, 170 ft, and 180 ft indoors today.)  Wheelchair:  5 - Standby Prompting/Supervision or Set-up  Distance Propelled:  Propels a minimum of 150 feet   Wheelchair Description:  Supervision for safety, Extra time (Pt. mobilized self in wheelchair on level terrain indoors x 150 ft. with both UEs/LEs with Supervision.)  Stairs 4 - Minimal Assistance  Stairs Description (CGA at gait belt, mod cueing for LE sequencing while using lateral approach with L railing only; 12 stairs)     Comprehension Mode:  Both  Comprehension:  6 - Modified Independent  Comprehension Description:  Hearing aids/amplifiers, Glasses, Verbal cues  Expression Mode:  Both  Expression:  6 - Modified Independent  Expression Description:  Verbal cueing  Social Interaction:  7 - Independent  Social Interaction Description:     Problem Solvin - Standby Prompting/Supervision or Set-up  Problem Solving Description:  Verbal cueing, Increased time, Therapy schedule  Memory:  5 - Standby Prompting/Supervision or Set-up  Memory Description:  Verbal cueing, Therapy schedule       Discharge Medication:     Medication List      START taking  these medications      Instructions   acetaminophen 500 MG Tabs  Commonly known as:  TYLENOL   Take 1 Tab by mouth every 6 hours as needed for Mild Pain or Moderate Pain (pain 3-8/10).  Dose:  500 mg     senna-docusate 8.6-50 MG Tabs  Commonly known as:  PERICOLACE or SENOKOT S   Take 1 Tab by mouth every evening.  Dose:  1 Tab        CONTINUE taking these medications      Instructions   bisoprolol 5 MG Tabs  Commonly known as:  ZEBETA   Take 1 Tab by mouth every day.  Dose:  5 mg     calcium carbonate 500 MG Chew  Commonly known as:  TUMS   Take 1 Tab by mouth 2 Times a Day.  Dose:  500 mg     Cholecalciferol 5000 units Tabs   Take 5,000 Units by mouth every day.  Dose:  5000 Units     docusate sodium 100 MG Caps   Take 100 mg by mouth 2 times a day.  Dose:  100 mg     finasteride 5 MG Tabs  Commonly known as:  PROSCAR   Take 1 Tab by mouth every day.  Dose:  5 mg     fluticasone 50 MCG/ACT nasal spray  Commonly known as:  FLONASE   Spray 2 Sprays in nose every day. Each Nostril  Dose:  2 Spray        STOP taking these medications    cephALEXin 500 MG Caps  Commonly known as:  KEFLEX     cyclobenzaprine 10 MG Tabs  Commonly known as:  FLEXERIL     enoxaparin 30 MG/0.3ML Soln inj  Commonly known as:  LOVENOX     hydrocodone/acetaminophen  MG Tabs  Commonly known as:  NORCO     ondansetron 4 MG Tbdp  Commonly known as:  ZOFRAN ODT     temazepam 15 MG Caps  Commonly known as:  RESTORIL          Discharge Location: Home with Spouse to [place], [place].      Post Acute Services:   [facility]  for nursing, physical therapy, and occupational therapy.  They will call you to schedule follow up home visits.       Durable Medical Equipment:     [facility]  [phone] will deliver equipment to you prior to discharge from [facility].         Follow-up Information:   · Tacos Forbes M.D.  [address], [address] [address] [phone]  Appointment is on 2/2/2018  Friday @ 3:00pm.      · Holden Easley M.D. Urology  [address] [facility] [address]  [phone]  Please call to pawel follow up appointment.      · Sara Baptiste M.D/ Chata MITCHELL  [address] [facility] [address]  [phone]  Appointment is on 1/26/2018 Friday @ 1:1pm with Chata Perez within Dr. Sara Baptiste's office. Records have been sent to office for continuity of care.      Condition on Discharge:  Good.    More than 35 minutes was spent on discharging this patient, including face-to-face time, prescription management, and the dictation of this note.

## 2018-01-08 NOTE — CARE PLAN
Problem: Mobility  Goal: STG-Within one week, patient will ambulate community distances  1) Individualized goal: 300 feet with SPV, c/s collar, and FWW  2) Interventions: PT Group Therapy, PT E Stim Attended, PT Gait Training, PT Therapeutic Exercises, PT Neuro Re-Ed/Balance, PT Therapeutic Activity and PT Manual Therapy.      Outcome: DISCHARGED-GOAL NOT MET Date Met: 01/08/18    Goal: STG-Within one week, patient will ascend and descend four to six stairs  1) Individualized goal: 8 standard stairs with SBA, c/s collar, and B railings  2) Interventions: PT Group Therapy, PT E Stim Attended, PT Gait Training, PT Therapeutic Exercises, PT Neuro Re-Ed/Balance, PT Therapeutic Activity and PT Manual Therapy.   Outcome: DISCHARGED-GOAL NOT MET Date Met: 01/08/18      Problem: Mobility Transfers  Goal: STG-Within one week, patient will transfer in/out of car  1) Individualized goal:  with SBA, c/s collar, and FWW  2) Interventions:  PT Group Therapy, PT E Stim Attended, PT Gait Training, PT Therapeutic Exercises, PT Neuro Re-Ed/Balance, PT Therapeutic Activity and PT Manual Therapy.       Outcome: MET Date Met: 01/08/18    Goal: STG-Within one week, patient will transfer bed to chair  1) Individualized goal: with Mod I, c/s collar, and FWW  2) Interventions: PT Group Therapy, PT E Stim Attended, PT Gait Training, PT Therapeutic Exercises, PT Neuro Re-Ed/Balance, PT Therapeutic Activity and PT Manual Therapy.      Outcome: MET Date Met: 01/08/18      Problem: PT-Long Term Goals  Goal: LTG-By discharge, patient will ambulate  1) Individualized goal:  500 feet with Mod I, c/s collar, and FWW  2) Interventions:  PT Group Therapy, PT E Stim Attended, PT Gait Training, PT Therapeutic Exercises, PT Neuro Re-Ed/Balance, PT Therapeutic Activity and PT Manual Therapy.     Outcome: DISCHARGED-GOAL NOT MET Date Met: 01/08/18    Goal: LTG-By discharge, patient will transfer one surface to another  1) Individualized goal:  with Mod I,  c/s collar, and FWW  2) Interventions: PT Group Therapy, PT E Stim Attended, PT Gait Training, PT Therapeutic Exercises, PT Neuro Re-Ed/Balance, PT Therapeutic Activity and PT Manual Therapy.     Outcome: MET Date Met: 01/08/18    Goal: LTG-By discharge, patient will ambulate up/down 4-6 stairs  1) Individualized goal:  4 standard stairs with SPV, c/s collar, and FWW  2) Interventions: PT Group Therapy, PT E Stim Attended, PT Gait Training, PT Therapeutic Exercises, PT Neuro Re-Ed/Balance, PT Therapeutic Activity and PT Manual Therapy.     Outcome: DISCHARGED-GOAL NOT MET Date Met: 01/08/18

## 2018-01-08 NOTE — CARE PLAN
Problem: Grooming  Goal: STG-Within one week, patient will complete grooming  1) Individualized Goal:  Spv/set up and use of AE/AD/Compensatory strategies as needed.   2) Interventions:  OT Self Care/ADL, OT Community Reintegration, OT Neuro Re-Ed/Balance, OT Sensory Int Techniques, OT Therapeutic Activity, OT Evaluation and OT Therapeutic Exercise     Outcome: DISCHARGED-GOAL NOT MET Date Met: 01/08/18  Pt requires in min A with managing c-collar, and vc's for precautions with collar off during shaving and hair drying.     Problem: Dressing  Goal: STG-Within one week, patient will dress LB  1) Individualized Goal:  Spv/set up and use of AE/AD/compensatory strategies as needed.   2) Interventions:  OT Group Therapy, OT Self Care/ADL, OT Community Reintegration, OT Neuro Re-Ed/Balance, OT Sensory Int Techniques, OT Therapeutic Activity, OT Evaluation and OT Therapeutic Exercise     Outcome: MET Date Met: 01/08/18  Mod I     Problem: OT Long Term Goals  Goal: LTG-By discharge, patient will complete basic self care tasks  1) Individualized Goal:  Mod I and use of AE/AD/Compensatory strategies as needed.   2) Interventions:  OT Self Care/ADL, OT Community Reintegration, OT Neuro Re-Ed/Balance, OT Sensory Int Techniques, OT Therapeutic Activity, OT Evaluation and OT Therapeutic Exercise     Outcome: DISCHARGED-GOAL NOT MET Date Met: 01/08/18  Mod I for all ADLs with exception to grooming and UB dressing due to requires assist with management of C-collar.     Goal: LTG-By discharge, patient will perform bathroom transfers  1) Individualized Goal:  Mod I with least restrictive AE/DME.   2) Interventions:  OT Self Care/ADL, OT Community Reintegration, OT Neuro Re-Ed/Balance, OT Sensory Int Techniques, OT Therapeutic Activity, OT Evaluation and OT Therapeutic Exercise     Outcome: MET Date Met: 01/08/18  octavio HUTCHINSON    Problem: IADL's  Goal: STG-Within one week, patient will  1) Individualized Goal:  Pt will participate  in community outing and maintain spinal precautions throughout with spv.   2) Interventions:  OT Group Therapy, OT Self Care/ADL, OT Community Reintegration, OT Neuro Re-Ed/Balance, OT Sensory Int Techniques, OT Therapeutic Activity, OT Evaluation and OT Therapeutic Exercise     Outcome: MET Date Met: 01/08/18

## 2018-01-08 NOTE — PROGRESS NOTES
Patient discharged to home per order. Discharge instructions reviewed with patient and wife; they verbalize understanding and patient signed copies placed in chart.  Patient has all belongings; signed copy of form in chart.  Patient left facility at 1423 via FWW accompanied by rehab staff, wife and son.

## 2018-01-08 NOTE — PROGRESS NOTES
Patient with bloody urine with blood clots in small amout, PVR 45 cc, Dr. Edwards notified.  Lovenox dc'd, urinalysis collected as ordered.

## 2018-01-11 ENCOUNTER — HOME CARE VISIT (OUTPATIENT)
Dept: HOME HEALTH SERVICES | Facility: HOME HEALTHCARE | Age: 78
End: 2018-01-11
Payer: MEDICARE

## 2018-01-11 PROCEDURE — 665998 HH PPS REVENUE CREDIT

## 2018-01-11 PROCEDURE — G0493 RN CARE EA 15 MIN HH/HOSPICE: HCPCS

## 2018-01-11 PROCEDURE — 665001 SOC-HOME HEALTH

## 2018-01-11 PROCEDURE — 665999 HH PPS REVENUE DEBIT

## 2018-01-11 SDOH — ECONOMIC STABILITY: HOUSING INSECURITY: UNSAFE APPLIANCES: 0

## 2018-01-11 SDOH — ECONOMIC STABILITY: HOUSING INSECURITY: UNSAFE COOKING RANGE AREA: 0

## 2018-01-11 SDOH — ECONOMIC STABILITY: HOUSING INSECURITY
HOME SAFETY: PT HAS SEVERAL THROW RUGS, NO SHOWER GRAB BARS AND SEVERAL SMALL DOGS THAT ARE OFTEN UNDERFOOT THAT CREATES A POTENTIAL RISK AS A TRIP/SLIP HAZARD.

## 2018-01-11 ASSESSMENT — ACTIVITIES OF DAILY LIVING (ADL)
HOME_HEALTH_OASIS: 01
OASIS_M1830: 03

## 2018-01-11 ASSESSMENT — ENCOUNTER SYMPTOMS
NAUSEA: DENIES
VOMITING: DENIES

## 2018-01-11 ASSESSMENT — PATIENT HEALTH QUESTIONNAIRE - PHQ9
1. LITTLE INTEREST OR PLEASURE IN DOING THINGS: 00
2. FEELING DOWN, DEPRESSED, IRRITABLE, OR HOPELESS: 00

## 2018-01-12 ENCOUNTER — TELEPHONE (OUTPATIENT)
Dept: VASCULAR LAB | Facility: MEDICAL CENTER | Age: 78
End: 2018-01-12

## 2018-01-12 ENCOUNTER — HOME CARE VISIT (OUTPATIENT)
Dept: HOME HEALTH SERVICES | Facility: HOME HEALTHCARE | Age: 78
End: 2018-01-12
Payer: MEDICARE

## 2018-01-12 VITALS
HEART RATE: 67 BPM | OXYGEN SATURATION: 98 % | DIASTOLIC BLOOD PRESSURE: 58 MMHG | TEMPERATURE: 98.7 F | RESPIRATION RATE: 18 BRPM | SYSTOLIC BLOOD PRESSURE: 110 MMHG

## 2018-01-12 PROCEDURE — 665998 HH PPS REVENUE CREDIT

## 2018-01-12 PROCEDURE — 665999 HH PPS REVENUE DEBIT

## 2018-01-12 PROCEDURE — G0151 HHCP-SERV OF PT,EA 15 MIN: HCPCS

## 2018-01-12 ASSESSMENT — ACTIVITIES OF DAILY LIVING (ADL)
IADLS_COMMENTS: <!--EPICS-->SEE OT EVAL<!--EPICE-->
ADLS_COMMENTS: <!--EPICS-->SEE OT EVAL<!--EPICE-->

## 2018-01-13 PROCEDURE — 665998 HH PPS REVENUE CREDIT

## 2018-01-13 PROCEDURE — 665999 HH PPS REVENUE DEBIT

## 2018-01-14 PROCEDURE — 665998 HH PPS REVENUE CREDIT

## 2018-01-14 PROCEDURE — 665999 HH PPS REVENUE DEBIT

## 2018-01-15 PROCEDURE — 665998 HH PPS REVENUE CREDIT

## 2018-01-15 PROCEDURE — 665999 HH PPS REVENUE DEBIT

## 2018-01-16 ENCOUNTER — HOME CARE VISIT (OUTPATIENT)
Dept: HOME HEALTH SERVICES | Facility: HOME HEALTHCARE | Age: 78
End: 2018-01-16
Payer: MEDICARE

## 2018-01-16 PROCEDURE — G0152 HHCP-SERV OF OT,EA 15 MIN: HCPCS

## 2018-01-16 PROCEDURE — 665999 HH PPS REVENUE DEBIT

## 2018-01-16 PROCEDURE — 665998 HH PPS REVENUE CREDIT

## 2018-01-16 PROCEDURE — G0151 HHCP-SERV OF PT,EA 15 MIN: HCPCS

## 2018-01-17 VITALS
TEMPERATURE: 99.5 F | HEART RATE: 62 BPM | DIASTOLIC BLOOD PRESSURE: 72 MMHG | OXYGEN SATURATION: 96 % | SYSTOLIC BLOOD PRESSURE: 124 MMHG | RESPIRATION RATE: 18 BRPM

## 2018-01-17 VITALS
TEMPERATURE: 99.5 F | RESPIRATION RATE: 18 BRPM | HEART RATE: 62 BPM | SYSTOLIC BLOOD PRESSURE: 124 MMHG | OXYGEN SATURATION: 96 % | DIASTOLIC BLOOD PRESSURE: 72 MMHG

## 2018-01-17 PROCEDURE — 665998 HH PPS REVENUE CREDIT

## 2018-01-17 PROCEDURE — 665999 HH PPS REVENUE DEBIT

## 2018-01-17 ASSESSMENT — ACTIVITIES OF DAILY LIVING (ADL)
HOUSEKEEPING_ASSISTANCE: 6
DRESSING_LB_ASSISTANCE: 4
BATHING_ASSISTANCE: 2
ORAL_CARE_ASSISTANCE: 0
GROOMING_ASSISTANCE: 0
TELEPHONE_ASSISTANCE: 0
MEAL_PREP_ASSISTANCE: 6
DRESSING_UB_ASSISTANCE: 4
EATING_ASSISTANCE: 0
SHOPPING_ASSISTANCE: 6
LAUNDRY_ASSISTANCE: 6
BATHING_ASSISTIVE_EQUIPMENT_USED: SHOWER CHAIR
TOILETING_ASSISTANCE: 0

## 2018-01-18 ENCOUNTER — HOME CARE VISIT (OUTPATIENT)
Dept: HOME HEALTH SERVICES | Facility: HOME HEALTHCARE | Age: 78
End: 2018-01-18
Payer: MEDICARE

## 2018-01-18 PROCEDURE — G0151 HHCP-SERV OF PT,EA 15 MIN: HCPCS

## 2018-01-18 PROCEDURE — 665999 HH PPS REVENUE DEBIT

## 2018-01-18 PROCEDURE — 665998 HH PPS REVENUE CREDIT

## 2018-01-19 ENCOUNTER — HOME CARE VISIT (OUTPATIENT)
Dept: HOME HEALTH SERVICES | Facility: HOME HEALTHCARE | Age: 78
End: 2018-01-19
Payer: MEDICARE

## 2018-01-19 VITALS
DIASTOLIC BLOOD PRESSURE: 58 MMHG | SYSTOLIC BLOOD PRESSURE: 108 MMHG | HEART RATE: 73 BPM | OXYGEN SATURATION: 94 % | TEMPERATURE: 99.9 F | RESPIRATION RATE: 16 BRPM

## 2018-01-19 PROCEDURE — G0299 HHS/HOSPICE OF RN EA 15 MIN: HCPCS

## 2018-01-19 PROCEDURE — 665999 HH PPS REVENUE DEBIT

## 2018-01-19 PROCEDURE — 665998 HH PPS REVENUE CREDIT

## 2018-01-20 ENCOUNTER — HOME CARE VISIT (OUTPATIENT)
Dept: HOME HEALTH SERVICES | Facility: HOME HEALTHCARE | Age: 78
End: 2018-01-20
Payer: MEDICARE

## 2018-01-20 VITALS
OXYGEN SATURATION: 91 % | TEMPERATURE: 97.9 F | SYSTOLIC BLOOD PRESSURE: 118 MMHG | HEART RATE: 60 BPM | RESPIRATION RATE: 16 BRPM | DIASTOLIC BLOOD PRESSURE: 63 MMHG

## 2018-01-20 PROCEDURE — 665998 HH PPS REVENUE CREDIT

## 2018-01-20 PROCEDURE — G0152 HHCP-SERV OF OT,EA 15 MIN: HCPCS

## 2018-01-20 PROCEDURE — 665999 HH PPS REVENUE DEBIT

## 2018-01-20 SDOH — ECONOMIC STABILITY: HOUSING INSECURITY: UNSAFE APPLIANCES: 0

## 2018-01-20 SDOH — ECONOMIC STABILITY: HOUSING INSECURITY: UNSAFE COOKING RANGE AREA: 0

## 2018-01-20 ASSESSMENT — ENCOUNTER SYMPTOMS
RESPIRATORY SYMPTOMS COMMENTS: DENIES SOB
VOMITING: DENIES
NAUSEA: DENIES

## 2018-01-21 VITALS
HEART RATE: 72 BPM | SYSTOLIC BLOOD PRESSURE: 112 MMHG | TEMPERATURE: 98.6 F | DIASTOLIC BLOOD PRESSURE: 68 MMHG | OXYGEN SATURATION: 95 % | RESPIRATION RATE: 16 BRPM

## 2018-01-21 PROCEDURE — 665998 HH PPS REVENUE CREDIT

## 2018-01-21 PROCEDURE — 665999 HH PPS REVENUE DEBIT

## 2018-01-22 PROCEDURE — 665999 HH PPS REVENUE DEBIT

## 2018-01-22 PROCEDURE — 665998 HH PPS REVENUE CREDIT

## 2018-01-23 ENCOUNTER — HOME CARE VISIT (OUTPATIENT)
Dept: HOME HEALTH SERVICES | Facility: HOME HEALTHCARE | Age: 78
End: 2018-01-23
Payer: MEDICARE

## 2018-01-23 VITALS
SYSTOLIC BLOOD PRESSURE: 126 MMHG | OXYGEN SATURATION: 94 % | HEART RATE: 62 BPM | DIASTOLIC BLOOD PRESSURE: 78 MMHG | TEMPERATURE: 99.3 F | RESPIRATION RATE: 16 BRPM

## 2018-01-23 VITALS
SYSTOLIC BLOOD PRESSURE: 124 MMHG | OXYGEN SATURATION: 97 % | RESPIRATION RATE: 16 BRPM | HEART RATE: 66 BPM | DIASTOLIC BLOOD PRESSURE: 64 MMHG | TEMPERATURE: 99.3 F

## 2018-01-23 PROCEDURE — 665999 HH PPS REVENUE DEBIT

## 2018-01-23 PROCEDURE — G0152 HHCP-SERV OF OT,EA 15 MIN: HCPCS

## 2018-01-23 PROCEDURE — 665998 HH PPS REVENUE CREDIT

## 2018-01-23 PROCEDURE — G0151 HHCP-SERV OF PT,EA 15 MIN: HCPCS

## 2018-01-24 PROCEDURE — 665998 HH PPS REVENUE CREDIT

## 2018-01-24 PROCEDURE — 665999 HH PPS REVENUE DEBIT

## 2018-01-25 ENCOUNTER — HOME CARE VISIT (OUTPATIENT)
Dept: HOME HEALTH SERVICES | Facility: HOME HEALTHCARE | Age: 78
End: 2018-01-25
Payer: MEDICARE

## 2018-01-25 VITALS
RESPIRATION RATE: 16 BRPM | HEART RATE: 87 BPM | DIASTOLIC BLOOD PRESSURE: 68 MMHG | TEMPERATURE: 99.3 F | SYSTOLIC BLOOD PRESSURE: 112 MMHG | OXYGEN SATURATION: 94 %

## 2018-01-25 PROCEDURE — 665999 HH PPS REVENUE DEBIT

## 2018-01-25 PROCEDURE — G0152 HHCP-SERV OF OT,EA 15 MIN: HCPCS

## 2018-01-25 PROCEDURE — 665998 HH PPS REVENUE CREDIT

## 2018-01-25 PROCEDURE — G0151 HHCP-SERV OF PT,EA 15 MIN: HCPCS

## 2018-01-26 ENCOUNTER — HOME CARE VISIT (OUTPATIENT)
Dept: HOME HEALTH SERVICES | Facility: HOME HEALTHCARE | Age: 78
End: 2018-01-26
Payer: MEDICARE

## 2018-01-26 VITALS
TEMPERATURE: 99.3 F | HEART RATE: 87 BPM | SYSTOLIC BLOOD PRESSURE: 112 MMHG | DIASTOLIC BLOOD PRESSURE: 68 MMHG | RESPIRATION RATE: 16 BRPM | OXYGEN SATURATION: 94 %

## 2018-01-26 VITALS
RESPIRATION RATE: 16 BRPM | HEART RATE: 76 BPM | TEMPERATURE: 99.1 F | DIASTOLIC BLOOD PRESSURE: 70 MMHG | SYSTOLIC BLOOD PRESSURE: 116 MMHG | OXYGEN SATURATION: 90 %

## 2018-01-26 PROCEDURE — G0299 HHS/HOSPICE OF RN EA 15 MIN: HCPCS

## 2018-01-26 PROCEDURE — 665998 HH PPS REVENUE CREDIT

## 2018-01-26 PROCEDURE — 665999 HH PPS REVENUE DEBIT

## 2018-01-27 PROCEDURE — 665999 HH PPS REVENUE DEBIT

## 2018-01-27 PROCEDURE — 665998 HH PPS REVENUE CREDIT

## 2018-01-27 SDOH — ECONOMIC STABILITY: HOUSING INSECURITY: UNSAFE APPLIANCES: 0

## 2018-01-27 SDOH — ECONOMIC STABILITY: HOUSING INSECURITY: UNSAFE COOKING RANGE AREA: 0

## 2018-01-28 PROCEDURE — 665998 HH PPS REVENUE CREDIT

## 2018-01-28 PROCEDURE — 665999 HH PPS REVENUE DEBIT

## 2018-01-29 ENCOUNTER — HOME CARE VISIT (OUTPATIENT)
Dept: HOME HEALTH SERVICES | Facility: HOME HEALTHCARE | Age: 78
End: 2018-01-29
Payer: MEDICARE

## 2018-01-29 VITALS
TEMPERATURE: 98.9 F | SYSTOLIC BLOOD PRESSURE: 128 MMHG | HEART RATE: 64 BPM | RESPIRATION RATE: 16 BRPM | OXYGEN SATURATION: 95 % | DIASTOLIC BLOOD PRESSURE: 76 MMHG

## 2018-01-29 PROCEDURE — G0152 HHCP-SERV OF OT,EA 15 MIN: HCPCS

## 2018-01-29 PROCEDURE — 665999 HH PPS REVENUE DEBIT

## 2018-01-29 PROCEDURE — 665998 HH PPS REVENUE CREDIT

## 2018-01-30 ENCOUNTER — HOME CARE VISIT (OUTPATIENT)
Dept: HOME HEALTH SERVICES | Facility: HOME HEALTHCARE | Age: 78
End: 2018-01-30
Payer: MEDICARE

## 2018-01-30 VITALS
OXYGEN SATURATION: 93 % | SYSTOLIC BLOOD PRESSURE: 112 MMHG | DIASTOLIC BLOOD PRESSURE: 68 MMHG | HEIGHT: 68 IN | TEMPERATURE: 98.5 F | HEART RATE: 59 BPM | WEIGHT: 168.2 LBS | RESPIRATION RATE: 18 BRPM | BODY MASS INDEX: 25.49 KG/M2

## 2018-01-30 PROCEDURE — 665999 HH PPS REVENUE DEBIT

## 2018-01-30 PROCEDURE — 665998 HH PPS REVENUE CREDIT

## 2018-01-30 PROCEDURE — G0151 HHCP-SERV OF PT,EA 15 MIN: HCPCS

## 2018-01-31 VITALS
HEART RATE: 74 BPM | RESPIRATION RATE: 18 BRPM | SYSTOLIC BLOOD PRESSURE: 138 MMHG | TEMPERATURE: 99.5 F | DIASTOLIC BLOOD PRESSURE: 72 MMHG | OXYGEN SATURATION: 99 %

## 2018-01-31 PROCEDURE — 665998 HH PPS REVENUE CREDIT

## 2018-01-31 PROCEDURE — 665999 HH PPS REVENUE DEBIT

## 2018-02-01 ENCOUNTER — HOME CARE VISIT (OUTPATIENT)
Dept: HOME HEALTH SERVICES | Facility: HOME HEALTHCARE | Age: 78
End: 2018-02-01
Payer: MEDICARE

## 2018-02-01 VITALS
TEMPERATURE: 99.3 F | OXYGEN SATURATION: 97 % | RESPIRATION RATE: 16 BRPM | DIASTOLIC BLOOD PRESSURE: 72 MMHG | SYSTOLIC BLOOD PRESSURE: 126 MMHG | HEART RATE: 74 BPM

## 2018-02-01 VITALS
OXYGEN SATURATION: 99 % | SYSTOLIC BLOOD PRESSURE: 122 MMHG | RESPIRATION RATE: 16 BRPM | TEMPERATURE: 99.4 F | DIASTOLIC BLOOD PRESSURE: 64 MMHG | HEART RATE: 99 BPM

## 2018-02-01 PROCEDURE — 665999 HH PPS REVENUE DEBIT

## 2018-02-01 PROCEDURE — 665998 HH PPS REVENUE CREDIT

## 2018-02-01 PROCEDURE — G0152 HHCP-SERV OF OT,EA 15 MIN: HCPCS

## 2018-02-01 PROCEDURE — G0151 HHCP-SERV OF PT,EA 15 MIN: HCPCS

## 2018-02-02 PROCEDURE — 665999 HH PPS REVENUE DEBIT

## 2018-02-02 PROCEDURE — 665998 HH PPS REVENUE CREDIT

## 2018-02-03 PROCEDURE — 665998 HH PPS REVENUE CREDIT

## 2018-02-03 PROCEDURE — 665999 HH PPS REVENUE DEBIT

## 2018-02-04 PROCEDURE — 665999 HH PPS REVENUE DEBIT

## 2018-02-04 PROCEDURE — 665998 HH PPS REVENUE CREDIT

## 2018-02-05 ENCOUNTER — HOME CARE VISIT (OUTPATIENT)
Dept: HOME HEALTH SERVICES | Facility: HOME HEALTHCARE | Age: 78
End: 2018-02-05
Payer: MEDICARE

## 2018-02-05 VITALS
TEMPERATURE: 99.4 F | RESPIRATION RATE: 18 BRPM | SYSTOLIC BLOOD PRESSURE: 120 MMHG | DIASTOLIC BLOOD PRESSURE: 64 MMHG | OXYGEN SATURATION: 92 % | HEART RATE: 81 BPM

## 2018-02-05 VITALS
DIASTOLIC BLOOD PRESSURE: 64 MMHG | TEMPERATURE: 99.4 F | HEART RATE: 81 BPM | SYSTOLIC BLOOD PRESSURE: 120 MMHG | RESPIRATION RATE: 18 BRPM | OXYGEN SATURATION: 92 %

## 2018-02-05 PROCEDURE — G0152 HHCP-SERV OF OT,EA 15 MIN: HCPCS

## 2018-02-05 PROCEDURE — 665998 HH PPS REVENUE CREDIT

## 2018-02-05 PROCEDURE — G0151 HHCP-SERV OF PT,EA 15 MIN: HCPCS

## 2018-02-05 PROCEDURE — 665999 HH PPS REVENUE DEBIT

## 2018-02-06 PROCEDURE — 665998 HH PPS REVENUE CREDIT

## 2018-02-06 PROCEDURE — 665999 HH PPS REVENUE DEBIT

## 2018-02-07 ENCOUNTER — HOME CARE VISIT (OUTPATIENT)
Dept: HOME HEALTH SERVICES | Facility: HOME HEALTHCARE | Age: 78
End: 2018-02-07
Payer: MEDICARE

## 2018-02-07 PROCEDURE — 665998 HH PPS REVENUE CREDIT

## 2018-02-07 PROCEDURE — 665999 HH PPS REVENUE DEBIT

## 2018-02-07 PROCEDURE — G0151 HHCP-SERV OF PT,EA 15 MIN: HCPCS

## 2018-02-08 ENCOUNTER — HOME CARE VISIT (OUTPATIENT)
Dept: HOME HEALTH SERVICES | Facility: HOME HEALTHCARE | Age: 78
End: 2018-02-08
Payer: MEDICARE

## 2018-02-08 VITALS
OXYGEN SATURATION: 94 % | SYSTOLIC BLOOD PRESSURE: 124 MMHG | TEMPERATURE: 99.6 F | DIASTOLIC BLOOD PRESSURE: 70 MMHG | HEART RATE: 73 BPM | RESPIRATION RATE: 16 BRPM

## 2018-02-08 VITALS
OXYGEN SATURATION: 97 % | SYSTOLIC BLOOD PRESSURE: 108 MMHG | TEMPERATURE: 99 F | RESPIRATION RATE: 16 BRPM | HEART RATE: 62 BPM | DIASTOLIC BLOOD PRESSURE: 62 MMHG

## 2018-02-08 PROCEDURE — 665999 HH PPS REVENUE DEBIT

## 2018-02-08 PROCEDURE — 665998 HH PPS REVENUE CREDIT

## 2018-02-08 PROCEDURE — G0152 HHCP-SERV OF OT,EA 15 MIN: HCPCS

## 2018-02-08 ASSESSMENT — ACTIVITIES OF DAILY LIVING (ADL)
DRESSING_LB_ASSISTANCE: 1
BATHING_ASSISTANCE: 1
ORAL_CARE_ASSISTANCE: 0
MEAL_PREP_ASSISTANCE: 4
TELEPHONE_ASSISTANCE: 0
TOILETING_ASSISTANCE: 0
BATHING_ASSISTIVE_EQUIPMENT_USED: SHOWER CHAIR
GROOMING_ASSISTANCE: 0
HOUSEKEEPING_ASSISTANCE: 6
LAUNDRY_ASSISTANCE: 6
DRESSING_UB_ASSISTANCE: 0
EATING_ASSISTANCE: 0

## 2018-02-09 PROCEDURE — 665998 HH PPS REVENUE CREDIT

## 2018-02-09 PROCEDURE — 665999 HH PPS REVENUE DEBIT

## 2018-02-10 PROCEDURE — 665998 HH PPS REVENUE CREDIT

## 2018-02-10 PROCEDURE — 665999 HH PPS REVENUE DEBIT

## 2018-02-11 PROCEDURE — 665998 HH PPS REVENUE CREDIT

## 2018-02-11 PROCEDURE — 665999 HH PPS REVENUE DEBIT

## 2018-02-12 ENCOUNTER — HOME CARE VISIT (OUTPATIENT)
Dept: HOME HEALTH SERVICES | Facility: HOME HEALTHCARE | Age: 78
End: 2018-02-12
Payer: MEDICARE

## 2018-02-12 VITALS
HEART RATE: 67 BPM | DIASTOLIC BLOOD PRESSURE: 68 MMHG | RESPIRATION RATE: 16 BRPM | OXYGEN SATURATION: 90 % | TEMPERATURE: 98.8 F | SYSTOLIC BLOOD PRESSURE: 118 MMHG

## 2018-02-12 VITALS
DIASTOLIC BLOOD PRESSURE: 68 MMHG | SYSTOLIC BLOOD PRESSURE: 118 MMHG | OXYGEN SATURATION: 90 % | RESPIRATION RATE: 16 BRPM | TEMPERATURE: 98.8 F | HEART RATE: 67 BPM

## 2018-02-12 PROCEDURE — 665998 HH PPS REVENUE CREDIT

## 2018-02-12 PROCEDURE — G0152 HHCP-SERV OF OT,EA 15 MIN: HCPCS

## 2018-02-12 PROCEDURE — 665999 HH PPS REVENUE DEBIT

## 2018-02-12 PROCEDURE — G0151 HHCP-SERV OF PT,EA 15 MIN: HCPCS

## 2018-02-13 PROCEDURE — 665999 HH PPS REVENUE DEBIT

## 2018-02-13 PROCEDURE — 665998 HH PPS REVENUE CREDIT

## 2018-02-14 ENCOUNTER — HOME CARE VISIT (OUTPATIENT)
Dept: HOME HEALTH SERVICES | Facility: HOME HEALTHCARE | Age: 78
End: 2018-02-14
Payer: MEDICARE

## 2018-02-14 PROCEDURE — 665999 HH PPS REVENUE DEBIT

## 2018-02-14 PROCEDURE — 665998 HH PPS REVENUE CREDIT

## 2018-02-14 PROCEDURE — G0151 HHCP-SERV OF PT,EA 15 MIN: HCPCS

## 2018-02-15 ENCOUNTER — HOME CARE VISIT (OUTPATIENT)
Dept: HOME HEALTH SERVICES | Facility: HOME HEALTHCARE | Age: 78
End: 2018-02-15
Payer: MEDICARE

## 2018-02-15 VITALS
DIASTOLIC BLOOD PRESSURE: 60 MMHG | TEMPERATURE: 99.6 F | OXYGEN SATURATION: 91 % | HEART RATE: 70 BPM | SYSTOLIC BLOOD PRESSURE: 110 MMHG | RESPIRATION RATE: 16 BRPM

## 2018-02-15 VITALS
DIASTOLIC BLOOD PRESSURE: 62 MMHG | TEMPERATURE: 99.2 F | RESPIRATION RATE: 16 BRPM | SYSTOLIC BLOOD PRESSURE: 120 MMHG | OXYGEN SATURATION: 98 % | HEART RATE: 77 BPM

## 2018-02-15 PROCEDURE — G0152 HHCP-SERV OF OT,EA 15 MIN: HCPCS

## 2018-02-15 PROCEDURE — 665998 HH PPS REVENUE CREDIT

## 2018-02-15 PROCEDURE — 665999 HH PPS REVENUE DEBIT

## 2018-02-16 PROCEDURE — 665999 HH PPS REVENUE DEBIT

## 2018-02-16 PROCEDURE — G0180 MD CERTIFICATION HHA PATIENT: HCPCS | Performed by: PHYSICAL MEDICINE & REHABILITATION

## 2018-02-16 PROCEDURE — 665998 HH PPS REVENUE CREDIT

## 2018-02-17 PROCEDURE — 665999 HH PPS REVENUE DEBIT

## 2018-02-17 PROCEDURE — 665998 HH PPS REVENUE CREDIT

## 2018-02-18 PROCEDURE — 665999 HH PPS REVENUE DEBIT

## 2018-02-18 PROCEDURE — 665998 HH PPS REVENUE CREDIT

## 2018-02-19 ENCOUNTER — HOME CARE VISIT (OUTPATIENT)
Dept: HOME HEALTH SERVICES | Facility: HOME HEALTHCARE | Age: 78
End: 2018-02-19
Payer: MEDICARE

## 2018-02-19 VITALS
DIASTOLIC BLOOD PRESSURE: 70 MMHG | TEMPERATURE: 99 F | RESPIRATION RATE: 18 BRPM | OXYGEN SATURATION: 92 % | SYSTOLIC BLOOD PRESSURE: 130 MMHG | HEART RATE: 67 BPM

## 2018-02-19 PROCEDURE — 665999 HH PPS REVENUE DEBIT

## 2018-02-19 PROCEDURE — G0151 HHCP-SERV OF PT,EA 15 MIN: HCPCS

## 2018-02-19 PROCEDURE — 665998 HH PPS REVENUE CREDIT

## 2018-02-19 PROCEDURE — G0152 HHCP-SERV OF OT,EA 15 MIN: HCPCS

## 2018-02-20 VITALS
RESPIRATION RATE: 18 BRPM | SYSTOLIC BLOOD PRESSURE: 130 MMHG | OXYGEN SATURATION: 92 % | DIASTOLIC BLOOD PRESSURE: 70 MMHG | HEART RATE: 67 BPM | TEMPERATURE: 99 F

## 2018-02-20 PROCEDURE — 665999 HH PPS REVENUE DEBIT

## 2018-02-20 PROCEDURE — 665998 HH PPS REVENUE CREDIT

## 2018-02-21 ENCOUNTER — HOME CARE VISIT (OUTPATIENT)
Dept: HOME HEALTH SERVICES | Facility: HOME HEALTHCARE | Age: 78
End: 2018-02-21
Payer: MEDICARE

## 2018-02-21 VITALS
SYSTOLIC BLOOD PRESSURE: 124 MMHG | TEMPERATURE: 99.1 F | DIASTOLIC BLOOD PRESSURE: 76 MMHG | RESPIRATION RATE: 18 BRPM | OXYGEN SATURATION: 98 % | HEART RATE: 68 BPM

## 2018-02-21 PROCEDURE — 665998 HH PPS REVENUE CREDIT

## 2018-02-21 PROCEDURE — 665999 HH PPS REVENUE DEBIT

## 2018-02-21 PROCEDURE — G0151 HHCP-SERV OF PT,EA 15 MIN: HCPCS

## 2018-02-22 PROCEDURE — 665999 HH PPS REVENUE DEBIT

## 2018-02-22 PROCEDURE — 665998 HH PPS REVENUE CREDIT

## 2018-02-23 ENCOUNTER — HOME CARE VISIT (OUTPATIENT)
Dept: HOME HEALTH SERVICES | Facility: HOME HEALTHCARE | Age: 78
End: 2018-02-23
Payer: MEDICARE

## 2018-02-23 PROCEDURE — 665999 HH PPS REVENUE DEBIT

## 2018-02-23 PROCEDURE — 665998 HH PPS REVENUE CREDIT

## 2018-02-23 PROCEDURE — G0152 HHCP-SERV OF OT,EA 15 MIN: HCPCS

## 2018-02-24 PROCEDURE — 665999 HH PPS REVENUE DEBIT

## 2018-02-24 PROCEDURE — 665998 HH PPS REVENUE CREDIT

## 2018-02-25 VITALS
OXYGEN SATURATION: 91 % | TEMPERATURE: 99.4 F | HEART RATE: 67 BPM | RESPIRATION RATE: 18 BRPM | SYSTOLIC BLOOD PRESSURE: 126 MMHG | DIASTOLIC BLOOD PRESSURE: 68 MMHG

## 2018-02-25 PROCEDURE — 665999 HH PPS REVENUE DEBIT

## 2018-02-25 PROCEDURE — 665998 HH PPS REVENUE CREDIT

## 2018-02-26 ENCOUNTER — HOME CARE VISIT (OUTPATIENT)
Dept: HOME HEALTH SERVICES | Facility: HOME HEALTHCARE | Age: 78
End: 2018-02-26
Payer: MEDICARE

## 2018-02-26 VITALS
RESPIRATION RATE: 18 BRPM | SYSTOLIC BLOOD PRESSURE: 100 MMHG | TEMPERATURE: 99 F | HEART RATE: 73 BPM | DIASTOLIC BLOOD PRESSURE: 68 MMHG | OXYGEN SATURATION: 99 %

## 2018-02-26 PROCEDURE — 665999 HH PPS REVENUE DEBIT

## 2018-02-26 PROCEDURE — 665998 HH PPS REVENUE CREDIT

## 2018-02-26 PROCEDURE — G0152 HHCP-SERV OF OT,EA 15 MIN: HCPCS

## 2018-02-27 PROCEDURE — 665998 HH PPS REVENUE CREDIT

## 2018-02-27 PROCEDURE — 665999 HH PPS REVENUE DEBIT

## 2018-02-28 ENCOUNTER — HOME CARE VISIT (OUTPATIENT)
Dept: HOME HEALTH SERVICES | Facility: HOME HEALTHCARE | Age: 78
End: 2018-02-28
Payer: MEDICARE

## 2018-02-28 VITALS
DIASTOLIC BLOOD PRESSURE: 60 MMHG | OXYGEN SATURATION: 91 % | TEMPERATURE: 99.3 F | SYSTOLIC BLOOD PRESSURE: 122 MMHG | HEART RATE: 75 BPM | RESPIRATION RATE: 18 BRPM

## 2018-02-28 PROCEDURE — 665999 HH PPS REVENUE DEBIT

## 2018-02-28 PROCEDURE — G0151 HHCP-SERV OF PT,EA 15 MIN: HCPCS

## 2018-02-28 PROCEDURE — 665998 HH PPS REVENUE CREDIT

## 2018-03-01 PROCEDURE — 665999 HH PPS REVENUE DEBIT

## 2018-03-01 PROCEDURE — 665998 HH PPS REVENUE CREDIT

## 2018-03-02 ENCOUNTER — HOME CARE VISIT (OUTPATIENT)
Dept: HOME HEALTH SERVICES | Facility: HOME HEALTHCARE | Age: 78
End: 2018-03-02
Payer: MEDICARE

## 2018-03-02 PROCEDURE — 665998 HH PPS REVENUE CREDIT

## 2018-03-02 PROCEDURE — G0152 HHCP-SERV OF OT,EA 15 MIN: HCPCS

## 2018-03-02 PROCEDURE — G0151 HHCP-SERV OF PT,EA 15 MIN: HCPCS

## 2018-03-02 PROCEDURE — 665999 HH PPS REVENUE DEBIT

## 2018-03-03 PROCEDURE — 665999 HH PPS REVENUE DEBIT

## 2018-03-03 PROCEDURE — 665998 HH PPS REVENUE CREDIT

## 2018-03-04 VITALS
DIASTOLIC BLOOD PRESSURE: 66 MMHG | SYSTOLIC BLOOD PRESSURE: 130 MMHG | OXYGEN SATURATION: 95 % | HEART RATE: 67 BPM | TEMPERATURE: 97.2 F | RESPIRATION RATE: 18 BRPM

## 2018-03-04 VITALS
OXYGEN SATURATION: 95 % | SYSTOLIC BLOOD PRESSURE: 130 MMHG | HEART RATE: 67 BPM | RESPIRATION RATE: 18 BRPM | DIASTOLIC BLOOD PRESSURE: 66 MMHG | TEMPERATURE: 97.2 F

## 2018-03-04 PROCEDURE — 665998 HH PPS REVENUE CREDIT

## 2018-03-04 PROCEDURE — 665999 HH PPS REVENUE DEBIT

## 2018-03-05 ENCOUNTER — HOME CARE VISIT (OUTPATIENT)
Dept: HOME HEALTH SERVICES | Facility: HOME HEALTHCARE | Age: 78
End: 2018-03-05
Payer: MEDICARE

## 2018-03-05 VITALS
DIASTOLIC BLOOD PRESSURE: 68 MMHG | RESPIRATION RATE: 16 BRPM | SYSTOLIC BLOOD PRESSURE: 128 MMHG | HEART RATE: 64 BPM | OXYGEN SATURATION: 98 % | TEMPERATURE: 99.6 F

## 2018-03-05 PROCEDURE — 665999 HH PPS REVENUE DEBIT

## 2018-03-05 PROCEDURE — G0151 HHCP-SERV OF PT,EA 15 MIN: HCPCS

## 2018-03-05 PROCEDURE — 665998 HH PPS REVENUE CREDIT

## 2018-03-06 PROCEDURE — 665998 HH PPS REVENUE CREDIT

## 2018-03-06 PROCEDURE — 665999 HH PPS REVENUE DEBIT

## 2018-03-07 ENCOUNTER — HOME CARE VISIT (OUTPATIENT)
Dept: HOME HEALTH SERVICES | Facility: HOME HEALTHCARE | Age: 78
End: 2018-03-07
Payer: MEDICARE

## 2018-03-07 VITALS
TEMPERATURE: 98.7 F | OXYGEN SATURATION: 99 % | DIASTOLIC BLOOD PRESSURE: 64 MMHG | SYSTOLIC BLOOD PRESSURE: 124 MMHG | HEART RATE: 84 BPM | RESPIRATION RATE: 16 BRPM

## 2018-03-07 PROCEDURE — G0152 HHCP-SERV OF OT,EA 15 MIN: HCPCS

## 2018-03-07 PROCEDURE — 665998 HH PPS REVENUE CREDIT

## 2018-03-07 PROCEDURE — 665999 HH PPS REVENUE DEBIT

## 2018-03-08 PROCEDURE — 665999 HH PPS REVENUE DEBIT

## 2018-03-08 PROCEDURE — 665998 HH PPS REVENUE CREDIT

## 2018-03-09 ENCOUNTER — HOME CARE VISIT (OUTPATIENT)
Dept: HOME HEALTH SERVICES | Facility: HOME HEALTHCARE | Age: 78
End: 2018-03-09
Payer: MEDICARE

## 2018-03-09 PROCEDURE — 665999 HH PPS REVENUE DEBIT

## 2018-03-09 PROCEDURE — G0151 HHCP-SERV OF PT,EA 15 MIN: HCPCS

## 2018-03-09 PROCEDURE — G0152 HHCP-SERV OF OT,EA 15 MIN: HCPCS

## 2018-03-09 PROCEDURE — 665998 HH PPS REVENUE CREDIT

## 2018-03-10 PROCEDURE — 665999 HH PPS REVENUE DEBIT

## 2018-03-10 PROCEDURE — 665998 HH PPS REVENUE CREDIT

## 2018-03-11 VITALS
HEART RATE: 70 BPM | TEMPERATURE: 99 F | RESPIRATION RATE: 18 BRPM | DIASTOLIC BLOOD PRESSURE: 70 MMHG | SYSTOLIC BLOOD PRESSURE: 130 MMHG | OXYGEN SATURATION: 96 %

## 2018-03-11 PROCEDURE — 665997 HH PPS REVENUE ADJ

## 2018-03-11 PROCEDURE — 665998 HH PPS REVENUE CREDIT

## 2018-03-11 PROCEDURE — 665999 HH PPS REVENUE DEBIT

## 2018-03-12 ENCOUNTER — HOME CARE VISIT (OUTPATIENT)
Dept: HOME HEALTH SERVICES | Facility: HOME HEALTHCARE | Age: 78
End: 2018-03-12
Payer: MEDICARE

## 2018-03-12 VITALS
RESPIRATION RATE: 18 BRPM | DIASTOLIC BLOOD PRESSURE: 70 MMHG | SYSTOLIC BLOOD PRESSURE: 130 MMHG | TEMPERATURE: 99 F | HEART RATE: 70 BPM | OXYGEN SATURATION: 96 %

## 2018-03-12 VITALS
RESPIRATION RATE: 18 BRPM | OXYGEN SATURATION: 91 % | DIASTOLIC BLOOD PRESSURE: 68 MMHG | HEART RATE: 64 BPM | TEMPERATURE: 98.8 F | SYSTOLIC BLOOD PRESSURE: 124 MMHG

## 2018-03-12 PROCEDURE — G0151 HHCP-SERV OF PT,EA 15 MIN: HCPCS

## 2018-03-12 PROCEDURE — G0152 HHCP-SERV OF OT,EA 15 MIN: HCPCS

## 2018-03-12 PROCEDURE — 665003 FOLLOW UP-HOME HEALTH

## 2018-03-12 PROCEDURE — 665998 HH PPS REVENUE CREDIT

## 2018-03-12 PROCEDURE — 665999 HH PPS REVENUE DEBIT

## 2018-03-12 SDOH — ECONOMIC STABILITY: HOUSING INSECURITY: UNSAFE APPLIANCES: 0

## 2018-03-12 SDOH — ECONOMIC STABILITY: HOUSING INSECURITY: UNSAFE COOKING RANGE AREA: 0

## 2018-03-12 ASSESSMENT — ACTIVITIES OF DAILY LIVING (ADL)
TELEPHONE_ASSISTANCE: 0
EATING_ASSISTANCE: 0
DRESSING_UB_ASSISTANCE: 0
LAUNDRY_ASSISTANCE: 0
MEAL_PREP_ASSISTANCE: 0
ORAL_CARE_ASSISTANCE: 0
MEAL_PREP_ASSISTANCE: 1
LAUNDRY_ASSISTANCE: 4
TELEPHONE_ASSISTANCE: 0
GROOMING_ASSISTANCE: 0
TOILETING_ASSISTANCE: 0
DRESSING_LB_ASSISTANCE: 0
HOUSEKEEPING_ASSISTANCE: 4
BATHING_ASSISTANCE: 0
EATING_ASSISTANCE: 0
HOUSEKEEPING_ASSISTANCE: 0
BATHING_ASSISTIVE_EQUIPMENT_USED: SHOWER CHAIR
SHOPPING_ASSISTANCE: 4
OASIS_M1830: 01
TOILETING_ASSISTANCE: 0
ORAL_CARE_ASSISTANCE: 0
GROOMING_ASSISTANCE: 0
BATHING_ASSISTANCE: 0
DRESSING_UB_ASSISTANCE: 0
DRESSING_LB_ASSISTANCE: 0
TRANSPORTATION_ASSISTANCE: 4

## 2018-03-13 VITALS
TEMPERATURE: 99.5 F | RESPIRATION RATE: 18 BRPM | DIASTOLIC BLOOD PRESSURE: 60 MMHG | HEART RATE: 64 BPM | OXYGEN SATURATION: 91 % | SYSTOLIC BLOOD PRESSURE: 122 MMHG

## 2018-03-13 PROCEDURE — 665998 HH PPS REVENUE CREDIT

## 2018-03-13 PROCEDURE — 665999 HH PPS REVENUE DEBIT

## 2018-03-13 ASSESSMENT — ENCOUNTER SYMPTOMS: SEVERE DYSPNEA: 1

## 2018-03-14 PROCEDURE — 665998 HH PPS REVENUE CREDIT

## 2018-03-14 PROCEDURE — 665999 HH PPS REVENUE DEBIT

## 2018-03-15 PROCEDURE — 665999 HH PPS REVENUE DEBIT

## 2018-03-15 PROCEDURE — 665998 HH PPS REVENUE CREDIT

## 2018-03-16 ENCOUNTER — HOME CARE VISIT (OUTPATIENT)
Dept: HOME HEALTH SERVICES | Facility: HOME HEALTHCARE | Age: 78
End: 2018-03-16
Payer: MEDICARE

## 2018-03-16 VITALS
OXYGEN SATURATION: 99 % | HEART RATE: 80 BPM | TEMPERATURE: 98.2 F | SYSTOLIC BLOOD PRESSURE: 120 MMHG | RESPIRATION RATE: 18 BRPM | DIASTOLIC BLOOD PRESSURE: 75 MMHG

## 2018-03-16 PROCEDURE — 665998 HH PPS REVENUE CREDIT

## 2018-03-16 PROCEDURE — 665999 HH PPS REVENUE DEBIT

## 2018-03-16 PROCEDURE — G0152 HHCP-SERV OF OT,EA 15 MIN: HCPCS

## 2018-03-17 PROCEDURE — 665998 HH PPS REVENUE CREDIT

## 2018-03-17 PROCEDURE — 665999 HH PPS REVENUE DEBIT

## 2018-03-18 PROCEDURE — 665999 HH PPS REVENUE DEBIT

## 2018-03-18 PROCEDURE — 665998 HH PPS REVENUE CREDIT

## 2018-03-19 ENCOUNTER — HOME CARE VISIT (OUTPATIENT)
Dept: HOME HEALTH SERVICES | Facility: HOME HEALTHCARE | Age: 78
End: 2018-03-19
Payer: MEDICARE

## 2018-03-19 VITALS
HEART RATE: 67 BPM | SYSTOLIC BLOOD PRESSURE: 122 MMHG | OXYGEN SATURATION: 97 % | DIASTOLIC BLOOD PRESSURE: 67 MMHG | RESPIRATION RATE: 16 BRPM | TEMPERATURE: 98.5 F

## 2018-03-19 PROCEDURE — G0152 HHCP-SERV OF OT,EA 15 MIN: HCPCS

## 2018-03-19 PROCEDURE — 665999 HH PPS REVENUE DEBIT

## 2018-03-19 PROCEDURE — G0151 HHCP-SERV OF PT,EA 15 MIN: HCPCS

## 2018-03-19 PROCEDURE — 665998 HH PPS REVENUE CREDIT

## 2018-03-20 VITALS
SYSTOLIC BLOOD PRESSURE: 122 MMHG | HEART RATE: 67 BPM | DIASTOLIC BLOOD PRESSURE: 66 MMHG | OXYGEN SATURATION: 97 % | TEMPERATURE: 98.5 F | RESPIRATION RATE: 16 BRPM

## 2018-03-20 PROCEDURE — 665999 HH PPS REVENUE DEBIT

## 2018-03-20 PROCEDURE — 665998 HH PPS REVENUE CREDIT

## 2018-03-21 ENCOUNTER — HOME CARE VISIT (OUTPATIENT)
Dept: HOME HEALTH SERVICES | Facility: HOME HEALTHCARE | Age: 78
End: 2018-03-21
Payer: MEDICARE

## 2018-03-21 VITALS
OXYGEN SATURATION: 97 % | SYSTOLIC BLOOD PRESSURE: 128 MMHG | DIASTOLIC BLOOD PRESSURE: 70 MMHG | RESPIRATION RATE: 16 BRPM | HEART RATE: 68 BPM | TEMPERATURE: 99.2 F

## 2018-03-21 VITALS
SYSTOLIC BLOOD PRESSURE: 128 MMHG | DIASTOLIC BLOOD PRESSURE: 70 MMHG | OXYGEN SATURATION: 97 % | RESPIRATION RATE: 16 BRPM | HEART RATE: 68 BPM | TEMPERATURE: 99.2 F

## 2018-03-21 PROCEDURE — G0152 HHCP-SERV OF OT,EA 15 MIN: HCPCS

## 2018-03-21 PROCEDURE — 665998 HH PPS REVENUE CREDIT

## 2018-03-21 PROCEDURE — G0151 HHCP-SERV OF PT,EA 15 MIN: HCPCS

## 2018-03-21 PROCEDURE — 665999 HH PPS REVENUE DEBIT

## 2018-03-21 PROCEDURE — G0179 MD RECERTIFICATION HHA PT: HCPCS | Performed by: PHYSICAL MEDICINE & REHABILITATION

## 2018-03-22 PROCEDURE — 665999 HH PPS REVENUE DEBIT

## 2018-03-22 PROCEDURE — 665998 HH PPS REVENUE CREDIT

## 2018-03-23 PROCEDURE — 665999 HH PPS REVENUE DEBIT

## 2018-03-23 PROCEDURE — 665998 HH PPS REVENUE CREDIT

## 2018-03-24 PROCEDURE — 665998 HH PPS REVENUE CREDIT

## 2018-03-24 PROCEDURE — 665999 HH PPS REVENUE DEBIT

## 2018-03-25 PROCEDURE — 665999 HH PPS REVENUE DEBIT

## 2018-03-25 PROCEDURE — 665998 HH PPS REVENUE CREDIT

## 2018-03-26 ENCOUNTER — HOME CARE VISIT (OUTPATIENT)
Dept: HOME HEALTH SERVICES | Facility: HOME HEALTHCARE | Age: 78
End: 2018-03-26
Payer: MEDICARE

## 2018-03-26 VITALS
HEART RATE: 59 BPM | RESPIRATION RATE: 16 BRPM | TEMPERATURE: 99 F | OXYGEN SATURATION: 98 % | SYSTOLIC BLOOD PRESSURE: 130 MMHG | DIASTOLIC BLOOD PRESSURE: 72 MMHG

## 2018-03-26 PROCEDURE — G0151 HHCP-SERV OF PT,EA 15 MIN: HCPCS

## 2018-03-26 PROCEDURE — 665998 HH PPS REVENUE CREDIT

## 2018-03-26 PROCEDURE — 665999 HH PPS REVENUE DEBIT

## 2018-03-27 ENCOUNTER — HOME CARE VISIT (OUTPATIENT)
Dept: HOME HEALTH SERVICES | Facility: HOME HEALTHCARE | Age: 78
End: 2018-03-27
Payer: MEDICARE

## 2018-03-27 PROCEDURE — 665999 HH PPS REVENUE DEBIT

## 2018-03-27 PROCEDURE — 665998 HH PPS REVENUE CREDIT

## 2018-03-28 ENCOUNTER — HOME CARE VISIT (OUTPATIENT)
Dept: HOME HEALTH SERVICES | Facility: HOME HEALTHCARE | Age: 78
End: 2018-03-28
Payer: MEDICARE

## 2018-03-28 PROCEDURE — G0151 HHCP-SERV OF PT,EA 15 MIN: HCPCS

## 2018-03-28 PROCEDURE — 665998 HH PPS REVENUE CREDIT

## 2018-03-28 PROCEDURE — 665999 HH PPS REVENUE DEBIT

## 2018-03-29 VITALS
SYSTOLIC BLOOD PRESSURE: 128 MMHG | HEART RATE: 67 BPM | DIASTOLIC BLOOD PRESSURE: 72 MMHG | TEMPERATURE: 98.6 F | OXYGEN SATURATION: 91 % | RESPIRATION RATE: 16 BRPM

## 2018-03-29 PROCEDURE — 665998 HH PPS REVENUE CREDIT

## 2018-03-29 PROCEDURE — 665999 HH PPS REVENUE DEBIT

## 2018-03-30 PROCEDURE — 665998 HH PPS REVENUE CREDIT

## 2018-03-30 PROCEDURE — 665999 HH PPS REVENUE DEBIT

## 2018-03-31 ENCOUNTER — HOME CARE VISIT (OUTPATIENT)
Dept: HOME HEALTH SERVICES | Facility: HOME HEALTHCARE | Age: 78
End: 2018-03-31
Payer: MEDICARE

## 2018-03-31 VITALS
SYSTOLIC BLOOD PRESSURE: 124 MMHG | OXYGEN SATURATION: 97 % | RESPIRATION RATE: 16 BRPM | DIASTOLIC BLOOD PRESSURE: 74 MMHG | HEART RATE: 59 BPM | TEMPERATURE: 97.7 F

## 2018-03-31 PROCEDURE — 665998 HH PPS REVENUE CREDIT

## 2018-03-31 PROCEDURE — 665999 HH PPS REVENUE DEBIT

## 2018-03-31 PROCEDURE — G0152 HHCP-SERV OF OT,EA 15 MIN: HCPCS

## 2018-04-01 PROCEDURE — 665999 HH PPS REVENUE DEBIT

## 2018-04-01 PROCEDURE — 665998 HH PPS REVENUE CREDIT

## 2018-04-02 ENCOUNTER — HOME CARE VISIT (OUTPATIENT)
Dept: HOME HEALTH SERVICES | Facility: HOME HEALTHCARE | Age: 78
End: 2018-04-02
Payer: MEDICARE

## 2018-04-02 VITALS
RESPIRATION RATE: 16 BRPM | TEMPERATURE: 99.1 F | SYSTOLIC BLOOD PRESSURE: 136 MMHG | HEART RATE: 66 BPM | DIASTOLIC BLOOD PRESSURE: 78 MMHG

## 2018-04-02 VITALS
DIASTOLIC BLOOD PRESSURE: 78 MMHG | HEART RATE: 66 BPM | SYSTOLIC BLOOD PRESSURE: 136 MMHG | RESPIRATION RATE: 16 BRPM | TEMPERATURE: 99.1 F

## 2018-04-02 PROCEDURE — 665998 HH PPS REVENUE CREDIT

## 2018-04-02 PROCEDURE — G0151 HHCP-SERV OF PT,EA 15 MIN: HCPCS

## 2018-04-02 PROCEDURE — G0152 HHCP-SERV OF OT,EA 15 MIN: HCPCS

## 2018-04-02 PROCEDURE — 665999 HH PPS REVENUE DEBIT

## 2018-04-03 PROCEDURE — 665999 HH PPS REVENUE DEBIT

## 2018-04-03 PROCEDURE — 665998 HH PPS REVENUE CREDIT

## 2018-04-04 ENCOUNTER — HOME CARE VISIT (OUTPATIENT)
Dept: HOME HEALTH SERVICES | Facility: HOME HEALTHCARE | Age: 78
End: 2018-04-04
Payer: MEDICARE

## 2018-04-04 VITALS
HEART RATE: 72 BPM | OXYGEN SATURATION: 95 % | TEMPERATURE: 98.4 F | RESPIRATION RATE: 16 BRPM | DIASTOLIC BLOOD PRESSURE: 72 MMHG | SYSTOLIC BLOOD PRESSURE: 132 MMHG

## 2018-04-04 PROCEDURE — 665999 HH PPS REVENUE DEBIT

## 2018-04-04 PROCEDURE — G0151 HHCP-SERV OF PT,EA 15 MIN: HCPCS

## 2018-04-04 PROCEDURE — 665998 HH PPS REVENUE CREDIT

## 2018-04-05 PROCEDURE — 665998 HH PPS REVENUE CREDIT

## 2018-04-05 PROCEDURE — 665999 HH PPS REVENUE DEBIT

## 2018-04-06 ENCOUNTER — HOME CARE VISIT (OUTPATIENT)
Dept: HOME HEALTH SERVICES | Facility: HOME HEALTHCARE | Age: 78
End: 2018-04-06
Payer: MEDICARE

## 2018-04-06 VITALS
TEMPERATURE: 98.7 F | SYSTOLIC BLOOD PRESSURE: 130 MMHG | HEART RATE: 99 BPM | RESPIRATION RATE: 16 BRPM | DIASTOLIC BLOOD PRESSURE: 84 MMHG | OXYGEN SATURATION: 99 %

## 2018-04-06 PROCEDURE — 665998 HH PPS REVENUE CREDIT

## 2018-04-06 PROCEDURE — 665999 HH PPS REVENUE DEBIT

## 2018-04-06 PROCEDURE — G0152 HHCP-SERV OF OT,EA 15 MIN: HCPCS

## 2018-04-06 ASSESSMENT — ACTIVITIES OF DAILY LIVING (ADL)
BATHING_ASSISTANCE: 0
GROOMING_ASSISTANCE: 0
EATING_ASSISTANCE: 0
TELEPHONE_ASSISTANCE: 0
TOILETING_ASSISTANCE: 0
MEAL_PREP_ASSISTANCE: 0
DRESSING_LB_ASSISTANCE: 0
BATHING_ASSISTIVE_EQUIPMENT_USED: SHOWER CHAIR
DRESSING_UB_ASSISTANCE: 0
ORAL_CARE_ASSISTANCE: 0

## 2018-04-07 PROCEDURE — 665999 HH PPS REVENUE DEBIT

## 2018-04-07 PROCEDURE — 665998 HH PPS REVENUE CREDIT

## 2018-04-08 PROCEDURE — 665999 HH PPS REVENUE DEBIT

## 2018-04-08 PROCEDURE — 665998 HH PPS REVENUE CREDIT

## 2018-04-09 ENCOUNTER — HOME CARE VISIT (OUTPATIENT)
Dept: HOME HEALTH SERVICES | Facility: HOME HEALTHCARE | Age: 78
End: 2018-04-09
Payer: MEDICARE

## 2018-04-09 PROCEDURE — 665999 HH PPS REVENUE DEBIT

## 2018-04-09 PROCEDURE — 665998 HH PPS REVENUE CREDIT

## 2018-04-10 PROCEDURE — 665998 HH PPS REVENUE CREDIT

## 2018-04-10 PROCEDURE — 665999 HH PPS REVENUE DEBIT

## 2018-04-11 ENCOUNTER — HOME CARE VISIT (OUTPATIENT)
Dept: HOME HEALTH SERVICES | Facility: HOME HEALTHCARE | Age: 78
End: 2018-04-11
Payer: MEDICARE

## 2018-04-11 PROCEDURE — 665998 HH PPS REVENUE CREDIT

## 2018-04-11 PROCEDURE — 665999 HH PPS REVENUE DEBIT

## 2018-04-11 PROCEDURE — G0151 HHCP-SERV OF PT,EA 15 MIN: HCPCS

## 2018-04-11 PROCEDURE — 665997 HH PPS REVENUE ADJ

## 2018-04-12 VITALS
HEART RATE: 76 BPM | SYSTOLIC BLOOD PRESSURE: 134 MMHG | DIASTOLIC BLOOD PRESSURE: 78 MMHG | TEMPERATURE: 99.3 F | RESPIRATION RATE: 16 BRPM

## 2018-04-12 PROCEDURE — 665998 HH PPS REVENUE CREDIT

## 2018-04-12 PROCEDURE — 665999 HH PPS REVENUE DEBIT

## 2018-04-12 ASSESSMENT — ACTIVITIES OF DAILY LIVING (ADL)
OASIS_M1830: 01
HOME_HEALTH_OASIS: 00

## 2018-04-13 PROCEDURE — 665999 HH PPS REVENUE DEBIT

## 2018-04-13 PROCEDURE — 665998 HH PPS REVENUE CREDIT

## 2018-04-14 PROCEDURE — 665998 HH PPS REVENUE CREDIT

## 2018-04-14 PROCEDURE — 665999 HH PPS REVENUE DEBIT

## 2018-04-15 PROCEDURE — 665999 HH PPS REVENUE DEBIT

## 2018-04-15 PROCEDURE — 665998 HH PPS REVENUE CREDIT

## 2018-04-16 PROCEDURE — 665999 HH PPS REVENUE DEBIT

## 2018-04-16 PROCEDURE — 665998 HH PPS REVENUE CREDIT

## 2018-04-17 PROCEDURE — 665998 HH PPS REVENUE CREDIT

## 2018-04-17 PROCEDURE — 665999 HH PPS REVENUE DEBIT

## 2018-04-18 PROCEDURE — 665998 HH PPS REVENUE CREDIT

## 2018-04-18 PROCEDURE — 665999 HH PPS REVENUE DEBIT

## 2018-04-19 PROCEDURE — 665999 HH PPS REVENUE DEBIT

## 2018-04-19 PROCEDURE — 665998 HH PPS REVENUE CREDIT

## 2018-04-20 PROCEDURE — 665999 HH PPS REVENUE DEBIT

## 2018-04-20 PROCEDURE — 665998 HH PPS REVENUE CREDIT

## 2018-04-21 PROCEDURE — 665998 HH PPS REVENUE CREDIT

## 2018-04-21 PROCEDURE — 665999 HH PPS REVENUE DEBIT

## 2018-04-22 PROCEDURE — 665998 HH PPS REVENUE CREDIT

## 2018-04-22 PROCEDURE — 665999 HH PPS REVENUE DEBIT

## 2018-04-23 PROCEDURE — 665999 HH PPS REVENUE DEBIT

## 2018-04-23 PROCEDURE — 665998 HH PPS REVENUE CREDIT

## 2019-06-19 NOTE — PROGRESS NOTES
Bedside report received from night shift RN, POC discussed, pt awake in bed, denies pain, questions, or concerns at this time, no s/s distress noted, call light w/in reach, monitoring in progress.   no

## 2019-07-15 ENCOUNTER — HOSPITAL ENCOUNTER (OUTPATIENT)
Dept: RADIOLOGY | Facility: MEDICAL CENTER | Age: 79
End: 2019-07-15

## 2019-07-22 ENCOUNTER — APPOINTMENT (OUTPATIENT)
Dept: RADIOLOGY | Facility: MEDICAL CENTER | Age: 79
DRG: 460 | End: 2019-07-22
Attending: NEUROLOGICAL SURGERY
Payer: MEDICARE

## 2019-07-22 ENCOUNTER — ANESTHESIA (OUTPATIENT)
Dept: SURGERY | Facility: MEDICAL CENTER | Age: 79
DRG: 460 | End: 2019-07-22
Payer: MEDICARE

## 2019-07-22 ENCOUNTER — HOSPITAL ENCOUNTER (INPATIENT)
Facility: MEDICAL CENTER | Age: 79
LOS: 2 days | DRG: 460 | End: 2019-07-24
Attending: NEUROLOGICAL SURGERY | Admitting: NEUROLOGICAL SURGERY
Payer: MEDICARE

## 2019-07-22 ENCOUNTER — ANESTHESIA EVENT (OUTPATIENT)
Dept: SURGERY | Facility: MEDICAL CENTER | Age: 79
DRG: 460 | End: 2019-07-22
Payer: MEDICARE

## 2019-07-22 PROCEDURE — 95955 EEG DURING SURGERY: CPT | Performed by: NEUROLOGICAL SURGERY

## 2019-07-22 PROCEDURE — 72020 X-RAY EXAM OF SPINE 1 VIEW: CPT

## 2019-07-22 PROCEDURE — 95925 SOMATOSENSORY TESTING: CPT | Performed by: NEUROLOGICAL SURGERY

## 2019-07-22 PROCEDURE — 700105 HCHG RX REV CODE 258: Performed by: NEUROLOGICAL SURGERY

## 2019-07-22 PROCEDURE — A6223 GAUZE >16<=48 NO W/SAL W/O B: HCPCS | Performed by: NEUROLOGICAL SURGERY

## 2019-07-22 PROCEDURE — 700111 HCHG RX REV CODE 636 W/ 250 OVERRIDE (IP): Performed by: ANESTHESIOLOGY

## 2019-07-22 PROCEDURE — 700102 HCHG RX REV CODE 250 W/ 637 OVERRIDE(OP): Performed by: NEUROLOGICAL SURGERY

## 2019-07-22 PROCEDURE — A9270 NON-COVERED ITEM OR SERVICE: HCPCS | Performed by: ANESTHESIOLOGY

## 2019-07-22 PROCEDURE — 95861 NEEDLE EMG 2 EXTREMITIES: CPT | Performed by: NEUROLOGICAL SURGERY

## 2019-07-22 PROCEDURE — 01NR0ZZ RELEASE SACRAL NERVE, OPEN APPROACH: ICD-10-PCS | Performed by: NEUROLOGICAL SURGERY

## 2019-07-22 PROCEDURE — 0SB20ZZ EXCISION OF LUMBAR VERTEBRAL DISC, OPEN APPROACH: ICD-10-PCS | Performed by: NEUROLOGICAL SURGERY

## 2019-07-22 PROCEDURE — 95937 NEUROMUSCULAR JUNCTION TEST: CPT | Performed by: NEUROLOGICAL SURGERY

## 2019-07-22 PROCEDURE — 160002 HCHG RECOVERY MINUTES (STAT): Performed by: NEUROLOGICAL SURGERY

## 2019-07-22 PROCEDURE — 700101 HCHG RX REV CODE 250: Performed by: NEUROLOGICAL SURGERY

## 2019-07-22 PROCEDURE — 500445 HCHG HEMOSTAT, SURGICEL 4X8: Performed by: NEUROLOGICAL SURGERY

## 2019-07-22 PROCEDURE — 01NB0ZZ RELEASE LUMBAR NERVE, OPEN APPROACH: ICD-10-PCS | Performed by: NEUROLOGICAL SURGERY

## 2019-07-22 PROCEDURE — 500331 HCHG COTTONOID, SURG PATTIE: Performed by: NEUROLOGICAL SURGERY

## 2019-07-22 PROCEDURE — 500885 HCHG PACK, JACKSON TABLE: Performed by: NEUROLOGICAL SURGERY

## 2019-07-22 PROCEDURE — 700106 HCHG RX REV CODE 271: Performed by: NEUROLOGICAL SURGERY

## 2019-07-22 PROCEDURE — 160036 HCHG PACU - EA ADDL 30 MINS PHASE I: Performed by: NEUROLOGICAL SURGERY

## 2019-07-22 PROCEDURE — 500367 HCHG DRAIN KIT, HEMOVAC: Performed by: NEUROLOGICAL SURGERY

## 2019-07-22 PROCEDURE — 00QT0ZZ REPAIR SPINAL MENINGES, OPEN APPROACH: ICD-10-PCS | Performed by: NEUROLOGICAL SURGERY

## 2019-07-22 PROCEDURE — 160009 HCHG ANES TIME/MIN: Performed by: NEUROLOGICAL SURGERY

## 2019-07-22 PROCEDURE — 0SG1071 FUSION OF 2 OR MORE LUMBAR VERTEBRAL JOINTS WITH AUTOLOGOUS TISSUE SUBSTITUTE, POSTERIOR APPROACH, POSTERIOR COLUMN, OPEN APPROACH: ICD-10-PCS | Performed by: NEUROLOGICAL SURGERY

## 2019-07-22 PROCEDURE — 700102 HCHG RX REV CODE 250 W/ 637 OVERRIDE(OP): Performed by: ANESTHESIOLOGY

## 2019-07-22 PROCEDURE — 770001 HCHG ROOM/CARE - MED/SURG/GYN PRIV*

## 2019-07-22 PROCEDURE — 500444 HCHG HEMOSTAT, SURGICEL 2X3: Performed by: NEUROLOGICAL SURGERY

## 2019-07-22 PROCEDURE — 160042 HCHG SURGERY MINUTES - EA ADDL 1 MIN LEVEL 5: Performed by: NEUROLOGICAL SURGERY

## 2019-07-22 PROCEDURE — 501838 HCHG SUTURE GENERAL: Performed by: NEUROLOGICAL SURGERY

## 2019-07-22 PROCEDURE — 4A11X4G MONITORING OF PERIPHERAL NERVOUS ELECTRICAL ACTIVITY, INTRAOPERATIVE, EXTERNAL APPROACH: ICD-10-PCS | Performed by: NEUROLOGICAL SURGERY

## 2019-07-22 PROCEDURE — 00UT0JZ SUPPLEMENT SPINAL MENINGES WITH SYNTHETIC SUBSTITUTE, OPEN APPROACH: ICD-10-PCS | Performed by: NEUROLOGICAL SURGERY

## 2019-07-22 PROCEDURE — A9270 NON-COVERED ITEM OR SERVICE: HCPCS | Performed by: NEUROLOGICAL SURGERY

## 2019-07-22 PROCEDURE — 95940 IONM IN OPERATNG ROOM 15 MIN: CPT | Performed by: NEUROLOGICAL SURGERY

## 2019-07-22 PROCEDURE — A4314 CATH W/DRAINAGE 2-WAY LATEX: HCPCS | Performed by: NEUROLOGICAL SURGERY

## 2019-07-22 PROCEDURE — 0SG3071 FUSION OF LUMBOSACRAL JOINT WITH AUTOLOGOUS TISSUE SUBSTITUTE, POSTERIOR APPROACH, POSTERIOR COLUMN, OPEN APPROACH: ICD-10-PCS | Performed by: NEUROLOGICAL SURGERY

## 2019-07-22 PROCEDURE — 700111 HCHG RX REV CODE 636 W/ 250 OVERRIDE (IP): Performed by: NEUROLOGICAL SURGERY

## 2019-07-22 PROCEDURE — 500864 HCHG NEEDLE, SPINAL 18G: Performed by: NEUROLOGICAL SURGERY

## 2019-07-22 PROCEDURE — 110371 HCHG SHELL REV 272: Performed by: NEUROLOGICAL SURGERY

## 2019-07-22 PROCEDURE — 160035 HCHG PACU - 1ST 60 MINS PHASE I: Performed by: NEUROLOGICAL SURGERY

## 2019-07-22 PROCEDURE — 700101 HCHG RX REV CODE 250: Performed by: ANESTHESIOLOGY

## 2019-07-22 PROCEDURE — 160048 HCHG OR STATISTICAL LEVEL 1-5: Performed by: NEUROLOGICAL SURGERY

## 2019-07-22 PROCEDURE — 160031 HCHG SURGERY MINUTES - 1ST 30 MINS LEVEL 5: Performed by: NEUROLOGICAL SURGERY

## 2019-07-22 DEVICE — GRAFT DURAMATRIX ONLAY PLUS 1IN X 1IN OR 2.7CM X 2.75CM: Type: IMPLANTABLE DEVICE | Site: SPINE LUMBAR | Status: FUNCTIONAL

## 2019-07-22 DEVICE — DURASEAL SEALANT SYSTEM 5ML - (5/BX): Type: IMPLANTABLE DEVICE | Site: SPINE LUMBAR | Status: FUNCTIONAL

## 2019-07-22 RX ORDER — FINASTERIDE 5 MG/1
5 TABLET, FILM COATED ORAL DAILY
Status: DISCONTINUED | OUTPATIENT
Start: 2019-07-23 | End: 2019-07-24 | Stop reason: HOSPADM

## 2019-07-22 RX ORDER — BUPIVACAINE HYDROCHLORIDE AND EPINEPHRINE 5; 5 MG/ML; UG/ML
INJECTION, SOLUTION EPIDURAL; INTRACAUDAL; PERINEURAL
Status: DISCONTINUED | OUTPATIENT
Start: 2019-07-22 | End: 2019-07-22 | Stop reason: HOSPADM

## 2019-07-22 RX ORDER — SODIUM CHLORIDE, SODIUM LACTATE, POTASSIUM CHLORIDE, AND CALCIUM CHLORIDE .6; .31; .03; .02 G/100ML; G/100ML; G/100ML; G/100ML
IRRIGANT IRRIGATION
Status: DISCONTINUED | OUTPATIENT
Start: 2019-07-22 | End: 2019-07-22 | Stop reason: HOSPADM

## 2019-07-22 RX ORDER — HALOPERIDOL 5 MG/ML
1 INJECTION INTRAMUSCULAR
Status: DISCONTINUED | OUTPATIENT
Start: 2019-07-22 | End: 2019-07-22 | Stop reason: HOSPADM

## 2019-07-22 RX ORDER — AMOXICILLIN 250 MG
1 CAPSULE ORAL NIGHTLY
Status: DISCONTINUED | OUTPATIENT
Start: 2019-07-22 | End: 2019-07-24 | Stop reason: HOSPADM

## 2019-07-22 RX ORDER — BACITRACIN 50000 [IU]/1
INJECTION, POWDER, FOR SOLUTION INTRAMUSCULAR
Status: DISCONTINUED | OUTPATIENT
Start: 2019-07-22 | End: 2019-07-22 | Stop reason: HOSPADM

## 2019-07-22 RX ORDER — SODIUM CHLORIDE 9 MG/ML
INJECTION, SOLUTION INTRAVENOUS CONTINUOUS
Status: DISCONTINUED | OUTPATIENT
Start: 2019-07-22 | End: 2019-07-24 | Stop reason: HOSPADM

## 2019-07-22 RX ORDER — ALBUTEROL SULFATE 90 UG/1
2 AEROSOL, METERED RESPIRATORY (INHALATION) EVERY 4 HOURS PRN
Status: DISCONTINUED | OUTPATIENT
Start: 2019-07-22 | End: 2019-07-24 | Stop reason: HOSPADM

## 2019-07-22 RX ORDER — DIPHENHYDRAMINE HYDROCHLORIDE 50 MG/ML
25 INJECTION INTRAMUSCULAR; INTRAVENOUS EVERY 6 HOURS PRN
Status: DISCONTINUED | OUTPATIENT
Start: 2019-07-22 | End: 2019-07-24 | Stop reason: HOSPADM

## 2019-07-22 RX ORDER — TIZANIDINE 2 MG/1
2-4 TABLET ORAL
Status: ON HOLD | COMMUNITY
End: 2019-07-24

## 2019-07-22 RX ORDER — HYDRALAZINE HYDROCHLORIDE 20 MG/ML
5 INJECTION INTRAMUSCULAR; INTRAVENOUS
Status: DISCONTINUED | OUTPATIENT
Start: 2019-07-22 | End: 2019-07-22 | Stop reason: HOSPADM

## 2019-07-22 RX ORDER — DIPHENHYDRAMINE HYDROCHLORIDE 50 MG/ML
12.5 INJECTION INTRAMUSCULAR; INTRAVENOUS
Status: DISCONTINUED | OUTPATIENT
Start: 2019-07-22 | End: 2019-07-22 | Stop reason: HOSPADM

## 2019-07-22 RX ORDER — METHOCARBAMOL 750 MG/1
750 TABLET, FILM COATED ORAL EVERY 8 HOURS PRN
Status: DISCONTINUED | OUTPATIENT
Start: 2019-07-22 | End: 2019-07-24 | Stop reason: HOSPADM

## 2019-07-22 RX ORDER — BISOPROLOL FUMARATE AND HYDROCHLOROTHIAZIDE 10; 6.25 MG/1; MG/1
1 TABLET ORAL DAILY
Status: DISCONTINUED | OUTPATIENT
Start: 2019-07-23 | End: 2019-07-22

## 2019-07-22 RX ORDER — ZOLPIDEM TARTRATE 5 MG/1
5 TABLET ORAL
Status: DISCONTINUED | OUTPATIENT
Start: 2019-07-23 | End: 2019-07-24 | Stop reason: HOSPADM

## 2019-07-22 RX ORDER — CEFAZOLIN SODIUM 1 G/3ML
INJECTION, POWDER, FOR SOLUTION INTRAMUSCULAR; INTRAVENOUS PRN
Status: DISCONTINUED | OUTPATIENT
Start: 2019-07-22 | End: 2019-07-22 | Stop reason: SURG

## 2019-07-22 RX ORDER — HYDROMORPHONE HYDROCHLORIDE 1 MG/ML
0.2 INJECTION, SOLUTION INTRAMUSCULAR; INTRAVENOUS; SUBCUTANEOUS
Status: DISCONTINUED | OUTPATIENT
Start: 2019-07-22 | End: 2019-07-22 | Stop reason: HOSPADM

## 2019-07-22 RX ORDER — SODIUM CHLORIDE, SODIUM LACTATE, POTASSIUM CHLORIDE, CALCIUM CHLORIDE 600; 310; 30; 20 MG/100ML; MG/100ML; MG/100ML; MG/100ML
INJECTION, SOLUTION INTRAVENOUS CONTINUOUS
Status: DISCONTINUED | OUTPATIENT
Start: 2019-07-22 | End: 2019-07-22

## 2019-07-22 RX ORDER — MEPERIDINE HYDROCHLORIDE 25 MG/ML
6.25 INJECTION INTRAMUSCULAR; INTRAVENOUS; SUBCUTANEOUS
Status: DISCONTINUED | OUTPATIENT
Start: 2019-07-22 | End: 2019-07-22 | Stop reason: HOSPADM

## 2019-07-22 RX ORDER — ONDANSETRON 2 MG/ML
INJECTION INTRAMUSCULAR; INTRAVENOUS PRN
Status: DISCONTINUED | OUTPATIENT
Start: 2019-07-22 | End: 2019-07-22 | Stop reason: SURG

## 2019-07-22 RX ORDER — DEXAMETHASONE SODIUM PHOSPHATE 4 MG/ML
4 INJECTION, SOLUTION INTRA-ARTICULAR; INTRALESIONAL; INTRAMUSCULAR; INTRAVENOUS; SOFT TISSUE EVERY 6 HOURS
Status: COMPLETED | OUTPATIENT
Start: 2019-07-22 | End: 2019-07-23

## 2019-07-22 RX ORDER — TIZANIDINE 4 MG/1
2 TABLET ORAL EVERY 6 HOURS PRN
Status: DISCONTINUED | OUTPATIENT
Start: 2019-07-22 | End: 2019-07-24 | Stop reason: HOSPADM

## 2019-07-22 RX ORDER — DOCUSATE SODIUM 100 MG/1
100 CAPSULE, LIQUID FILLED ORAL 2 TIMES DAILY
Status: DISCONTINUED | OUTPATIENT
Start: 2019-07-23 | End: 2019-07-24 | Stop reason: HOSPADM

## 2019-07-22 RX ORDER — BISACODYL 10 MG
10 SUPPOSITORY, RECTAL RECTAL
Status: DISCONTINUED | OUTPATIENT
Start: 2019-07-22 | End: 2019-07-24 | Stop reason: HOSPADM

## 2019-07-22 RX ORDER — POLYETHYLENE GLYCOL 3350 17 G/17G
1 POWDER, FOR SOLUTION ORAL 2 TIMES DAILY PRN
Status: DISCONTINUED | OUTPATIENT
Start: 2019-07-22 | End: 2019-07-24 | Stop reason: HOSPADM

## 2019-07-22 RX ORDER — BISOPROLOL FUMARATE 5 MG/1
10 TABLET, FILM COATED ORAL
Status: DISCONTINUED | OUTPATIENT
Start: 2019-07-23 | End: 2019-07-24 | Stop reason: HOSPADM

## 2019-07-22 RX ORDER — ONDANSETRON 2 MG/ML
4 INJECTION INTRAMUSCULAR; INTRAVENOUS EVERY 4 HOURS PRN
Status: DISCONTINUED | OUTPATIENT
Start: 2019-07-22 | End: 2019-07-24 | Stop reason: HOSPADM

## 2019-07-22 RX ORDER — BISOPROLOL FUMARATE AND HYDROCHLOROTHIAZIDE 10; 6.25 MG/1; MG/1
1 TABLET ORAL DAILY
COMMUNITY
End: 2021-04-02 | Stop reason: SDUPTHER

## 2019-07-22 RX ORDER — HYDROMORPHONE HYDROCHLORIDE 1 MG/ML
0.1 INJECTION, SOLUTION INTRAMUSCULAR; INTRAVENOUS; SUBCUTANEOUS
Status: DISCONTINUED | OUTPATIENT
Start: 2019-07-22 | End: 2019-07-22 | Stop reason: HOSPADM

## 2019-07-22 RX ORDER — OXYCODONE HCL 5 MG/5 ML
5 SOLUTION, ORAL ORAL
Status: COMPLETED | OUTPATIENT
Start: 2019-07-22 | End: 2019-07-22

## 2019-07-22 RX ORDER — MORPHINE SULFATE 4 MG/ML
2 INJECTION, SOLUTION INTRAMUSCULAR; INTRAVENOUS
Status: DISCONTINUED | OUTPATIENT
Start: 2019-07-22 | End: 2019-07-24 | Stop reason: HOSPADM

## 2019-07-22 RX ORDER — ENEMA 19; 7 G/133ML; G/133ML
1 ENEMA RECTAL
Status: DISCONTINUED | OUTPATIENT
Start: 2019-07-22 | End: 2019-07-24 | Stop reason: HOSPADM

## 2019-07-22 RX ORDER — ASCORBIC ACID 500 MG
1000 TABLET ORAL DAILY
Status: DISCONTINUED | OUTPATIENT
Start: 2019-07-22 | End: 2019-07-24 | Stop reason: HOSPADM

## 2019-07-22 RX ORDER — CETIRIZINE HYDROCHLORIDE 10 MG/1
10 TABLET ORAL DAILY
Status: DISCONTINUED | OUTPATIENT
Start: 2019-07-23 | End: 2019-07-24 | Stop reason: HOSPADM

## 2019-07-22 RX ORDER — SODIUM CHLORIDE 9 MG/ML
INJECTION, SOLUTION INTRAVENOUS
Status: COMPLETED | OUTPATIENT
Start: 2019-07-22 | End: 2019-07-22

## 2019-07-22 RX ORDER — HYDROMORPHONE HYDROCHLORIDE 2 MG/ML
INJECTION, SOLUTION INTRAMUSCULAR; INTRAVENOUS; SUBCUTANEOUS PRN
Status: DISCONTINUED | OUTPATIENT
Start: 2019-07-22 | End: 2019-07-22 | Stop reason: SURG

## 2019-07-22 RX ORDER — AMOXICILLIN 250 MG
1 CAPSULE ORAL
Status: DISCONTINUED | OUTPATIENT
Start: 2019-07-22 | End: 2019-07-24 | Stop reason: HOSPADM

## 2019-07-22 RX ORDER — ONDANSETRON 2 MG/ML
4 INJECTION INTRAMUSCULAR; INTRAVENOUS
Status: DISCONTINUED | OUTPATIENT
Start: 2019-07-22 | End: 2019-07-22 | Stop reason: HOSPADM

## 2019-07-22 RX ORDER — OXYCODONE HYDROCHLORIDE 5 MG/1
2.5 TABLET ORAL
Status: DISCONTINUED | OUTPATIENT
Start: 2019-07-22 | End: 2019-07-24 | Stop reason: HOSPADM

## 2019-07-22 RX ORDER — HYDROCHLOROTHIAZIDE 25 MG/1
6.25 TABLET ORAL
Status: DISCONTINUED | OUTPATIENT
Start: 2019-07-23 | End: 2019-07-24 | Stop reason: HOSPADM

## 2019-07-22 RX ORDER — SODIUM CHLORIDE, SODIUM LACTATE, POTASSIUM CHLORIDE, CALCIUM CHLORIDE 600; 310; 30; 20 MG/100ML; MG/100ML; MG/100ML; MG/100ML
INJECTION, SOLUTION INTRAVENOUS CONTINUOUS
Status: DISCONTINUED | OUTPATIENT
Start: 2019-07-22 | End: 2019-07-22 | Stop reason: HOSPADM

## 2019-07-22 RX ORDER — ONDANSETRON 4 MG/1
4 TABLET, ORALLY DISINTEGRATING ORAL EVERY 4 HOURS PRN
Status: DISCONTINUED | OUTPATIENT
Start: 2019-07-22 | End: 2019-07-24 | Stop reason: HOSPADM

## 2019-07-22 RX ORDER — OXYCODONE HCL 5 MG/5 ML
10 SOLUTION, ORAL ORAL
Status: COMPLETED | OUTPATIENT
Start: 2019-07-22 | End: 2019-07-22

## 2019-07-22 RX ORDER — MAGNESIUM HYDROXIDE 1200 MG/15ML
LIQUID ORAL
Status: COMPLETED | OUTPATIENT
Start: 2019-07-22 | End: 2019-07-22

## 2019-07-22 RX ORDER — DIPHENHYDRAMINE HCL 25 MG
25 TABLET ORAL EVERY 6 HOURS PRN
Status: DISCONTINUED | OUTPATIENT
Start: 2019-07-22 | End: 2019-07-24 | Stop reason: HOSPADM

## 2019-07-22 RX ORDER — ACETAMINOPHEN 500 MG
1000 TABLET ORAL EVERY 6 HOURS
Status: DISCONTINUED | OUTPATIENT
Start: 2019-07-22 | End: 2019-07-24 | Stop reason: HOSPADM

## 2019-07-22 RX ORDER — HYDROMORPHONE HYDROCHLORIDE 1 MG/ML
0.4 INJECTION, SOLUTION INTRAMUSCULAR; INTRAVENOUS; SUBCUTANEOUS
Status: DISCONTINUED | OUTPATIENT
Start: 2019-07-22 | End: 2019-07-22 | Stop reason: HOSPADM

## 2019-07-22 RX ORDER — DULOXETIN HYDROCHLORIDE 60 MG/1
60 CAPSULE, DELAYED RELEASE ORAL DAILY
Status: DISCONTINUED | OUTPATIENT
Start: 2019-07-23 | End: 2019-07-24 | Stop reason: HOSPADM

## 2019-07-22 RX ORDER — GABAPENTIN 300 MG/1
300 CAPSULE ORAL ONCE
Status: DISCONTINUED | OUTPATIENT
Start: 2019-07-22 | End: 2019-07-22 | Stop reason: HOSPADM

## 2019-07-22 RX ORDER — ACETAMINOPHEN 500 MG
1000 TABLET ORAL ONCE
Status: DISCONTINUED | OUTPATIENT
Start: 2019-07-22 | End: 2019-07-22 | Stop reason: HOSPADM

## 2019-07-22 RX ORDER — CEFAZOLIN SODIUM 2 G/100ML
2 INJECTION, SOLUTION INTRAVENOUS EVERY 8 HOURS
Status: COMPLETED | OUTPATIENT
Start: 2019-07-22 | End: 2019-07-23

## 2019-07-22 RX ORDER — CALCIUM CARBONATE 500 MG/1
500 TABLET, CHEWABLE ORAL 2 TIMES DAILY
Status: DISCONTINUED | OUTPATIENT
Start: 2019-07-22 | End: 2019-07-24 | Stop reason: HOSPADM

## 2019-07-22 RX ORDER — SIMVASTATIN 20 MG
40 TABLET ORAL EVERY EVENING
Status: DISCONTINUED | OUTPATIENT
Start: 2019-07-22 | End: 2019-07-24 | Stop reason: HOSPADM

## 2019-07-22 RX ADMIN — LIDOCAINE HYDROCHLORIDE 50 MG: 20 INJECTION, SOLUTION INTRAVENOUS at 09:51

## 2019-07-22 RX ADMIN — SODIUM CHLORIDE, POTASSIUM CHLORIDE, SODIUM LACTATE AND CALCIUM CHLORIDE: 600; 310; 30; 20 INJECTION, SOLUTION INTRAVENOUS at 08:03

## 2019-07-22 RX ADMIN — ANTACID TABLETS 500 MG: 500 TABLET, CHEWABLE ORAL at 18:45

## 2019-07-22 RX ADMIN — ACETAMINOPHEN 1000 MG: 500 TABLET ORAL at 18:45

## 2019-07-22 RX ADMIN — OXYCODONE HYDROCHLORIDE 10 MG: 5 SOLUTION ORAL at 14:01

## 2019-07-22 RX ADMIN — ROCURONIUM BROMIDE 5 MG: 10 INJECTION, SOLUTION INTRAVENOUS at 09:51

## 2019-07-22 RX ADMIN — FENTANYL CITRATE 50 MCG: 50 INJECTION, SOLUTION INTRAMUSCULAR; INTRAVENOUS at 12:10

## 2019-07-22 RX ADMIN — CEFAZOLIN 2 G: 330 INJECTION, POWDER, FOR SOLUTION INTRAMUSCULAR; INTRAVENOUS at 09:55

## 2019-07-22 RX ADMIN — LIDOCAINE HYDROCHLORIDE 0.5 ML: 10 INJECTION, SOLUTION EPIDURAL; INFILTRATION; INTRACAUDAL at 08:03

## 2019-07-22 RX ADMIN — CEFAZOLIN SODIUM 2 G: 2 INJECTION, SOLUTION INTRAVENOUS at 18:45

## 2019-07-22 RX ADMIN — DEXAMETHASONE SODIUM PHOSPHATE 4 MG: 4 INJECTION, SOLUTION INTRA-ARTICULAR; INTRALESIONAL; INTRAMUSCULAR; INTRAVENOUS; SOFT TISSUE at 18:45

## 2019-07-22 RX ADMIN — PROPOFOL 150 MG: 10 INJECTION, EMULSION INTRAVENOUS at 09:51

## 2019-07-22 RX ADMIN — SODIUM CHLORIDE: 9 INJECTION, SOLUTION INTRAVENOUS at 18:44

## 2019-07-22 RX ADMIN — PROPOFOL 140 MCG/KG/MIN: 10 INJECTION, EMULSION INTRAVENOUS at 09:52

## 2019-07-22 RX ADMIN — HYDROMORPHONE HYDROCHLORIDE 0.5 MG: 2 INJECTION, SOLUTION INTRAMUSCULAR; INTRAVENOUS; SUBCUTANEOUS at 10:03

## 2019-07-22 RX ADMIN — EPHEDRINE SULFATE 10 MG: 50 INJECTION INTRAMUSCULAR; INTRAVENOUS; SUBCUTANEOUS at 10:20

## 2019-07-22 RX ADMIN — ONDANSETRON 4 MG: 2 INJECTION INTRAMUSCULAR; INTRAVENOUS at 12:15

## 2019-07-22 RX ADMIN — SIMVASTATIN 40 MG: 20 TABLET, FILM COATED ORAL at 18:45

## 2019-07-22 RX ADMIN — OXYCODONE HYDROCHLORIDE AND ACETAMINOPHEN 1000 MG: 500 TABLET ORAL at 18:45

## 2019-07-22 RX ADMIN — SODIUM CHLORIDE, POTASSIUM CHLORIDE, SODIUM LACTATE AND CALCIUM CHLORIDE: 600; 310; 30; 20 INJECTION, SOLUTION INTRAVENOUS at 10:45

## 2019-07-22 RX ADMIN — SUCCINYLCHOLINE CHLORIDE 80 MG: 20 INJECTION, SOLUTION INTRAMUSCULAR; INTRAVENOUS at 09:51

## 2019-07-22 RX ADMIN — HYDROMORPHONE HYDROCHLORIDE 0.5 MG: 2 INJECTION, SOLUTION INTRAMUSCULAR; INTRAVENOUS; SUBCUTANEOUS at 10:50

## 2019-07-22 ASSESSMENT — COGNITIVE AND FUNCTIONAL STATUS - GENERAL
WALKING IN HOSPITAL ROOM: A LITTLE
MOBILITY SCORE: 18
DRESSING REGULAR LOWER BODY CLOTHING: A LITTLE
MOVING FROM LYING ON BACK TO SITTING ON SIDE OF FLAT BED: A LITTLE
SUGGESTED CMS G CODE MODIFIER DAILY ACTIVITY: CJ
MOVING TO AND FROM BED TO CHAIR: A LITTLE
STANDING UP FROM CHAIR USING ARMS: A LITTLE
TURNING FROM BACK TO SIDE WHILE IN FLAT BAD: A LITTLE
CLIMB 3 TO 5 STEPS WITH RAILING: A LITTLE
TOILETING: A LITTLE
SUGGESTED CMS G CODE MODIFIER MOBILITY: CK
DAILY ACTIVITIY SCORE: 22

## 2019-07-22 ASSESSMENT — PATIENT HEALTH QUESTIONNAIRE - PHQ9
2. FEELING DOWN, DEPRESSED, IRRITABLE, OR HOPELESS: NOT AT ALL
SUM OF ALL RESPONSES TO PHQ9 QUESTIONS 1 AND 2: 0
1. LITTLE INTEREST OR PLEASURE IN DOING THINGS: NOT AT ALL

## 2019-07-22 ASSESSMENT — LIFESTYLE VARIABLES: EVER_SMOKED: YES

## 2019-07-22 ASSESSMENT — COPD QUESTIONNAIRES
COPD SCREENING SCORE: 4
HAVE YOU SMOKED AT LEAST 100 CIGARETTES IN YOUR ENTIRE LIFE: YES
DURING THE PAST 4 WEEKS HOW MUCH DID YOU FEEL SHORT OF BREATH: NONE/LITTLE OF THE TIME
DO YOU EVER COUGH UP ANY MUCUS OR PHLEGM?: NO/ONLY WITH OCCASIONAL COLDS OR INFECTIONS

## 2019-07-22 ASSESSMENT — PAIN SCALES - GENERAL: PAIN_LEVEL: 0

## 2019-07-22 NOTE — OP REPORT
DATE OF SERVICE:  07/22/2019    PREOPERATIVE DIAGNOSES:  Large left L2-L3 disk herniation with L3-S1 stenosis   and left greater than right radiculopathy and neurogenic claudication.    POSTOPERATIVE DIAGNOSES:  Large left L2-L3 disk herniation with L3-S1 stenosis   and left greater than right radiculopathy and neurogenic claudication.    PRINCIPAL PROCEDURES PERFORMED:  1.  Left L2-L3 laminectomy and left L2-L3 microdiskectomy.  2.  L3-L4, L4-L5, and L5-S1 decompressive laminectomy, bilateral medial   facetectomy, and bilateral foraminotomies.  Please note that bilateral L2,   bilateral L3, bilateral L4, bilateral L5 and bilateral S1 nerve roots were   completely decompressed.  3.  L2-S1 posterolateral onlay fusion with local autograft.    SURGEON:  Tacos Forbes MD    ASSISTANT:  Reinaldo Lal MD    ANESTHESIA:  The procedure was performed under general anesthesia.    ANESTHESIOLOGIST:  Simone Arguello MD    COMPLICATIONS:  There were no complications.    FINDINGS:  Include evidence of significant spinal canal compromise and a large   left L2-L3 subannular disk herniation, which was removed.  Complications   included durotomy, which was primarily repaired.    For IV fluids, urine output, estimated blood loss, please see the anesthesia   record.    DISPOSITION:  The patient will be extubated and brought to the recovery room.    CLINICAL HISTORY:  The patient is a 78-year-old male who was kindly referred   for neurosurgical evaluation.  The patient's primary care physician is Dr. Sara Baptiste.  The patient presents with severe left greater than right   radiculopathy and neurogenic claudication.  He also needs a knee surgery.  His   doctor told him to get his back fixed first.  Risks, benefits, and options   were discussed.  The patient signed a written informed consent.    DESCRIPTION OF PROCEDURE:  The patient was brought to the operating room and   placed under general anesthesia.  Montero catheter was  placed.  Needle   electrodes were placed for baseline SSEP and EMGs.  Remote monitoring was   performed by [facility].  The patient was turned prone atop a   radiolucent OSI table and all pressure points were padded.  The back was   shaved, prepped and draped in the usual sterile fashion.  A time-out was   performed.  Local anesthetic was infiltrated in the skin.  A midline skin   incision was made over L2-S1.  The thoracolumbar fascia was incised.  Further   local anesthetic was infiltrated to the paraspinous muscles.  I used a Leksell   rongeur to remove the inferior spinous processes of L2 and the spinous   processes of L3, L4, L5, and S1.  I created gutters just medial to the facet   complexes by using an AMA drill bit.  I used a Kerrison 2 and Kerrison 3 punch   to complete bilateral decompressive laminectomies, bilateral medial   facetectomies, and bilateral foraminotomies to decompress bilateral L2,   bilateral L3, bilateral L4, bilateral L5, and bilateral S1 nerve roots.  The   shoulder of the left S1 nerve root was gently retracted medially and a large   subannular disk herniation was appreciated.  A small annulotomy was made and a   very large disk fragment was removed.  The annulotomy was bipolar coagulated.    There was an incidental durotomy seen just to the left of midline at S1.    This was repaired by using 4-0 Nurolon stitches.  The Valsalva maneuver did   not demonstrate any evidence of CSF leak.  I placed some Duragen and the   smallest amount of blue glue.  Perfect hemostasis was achieved.  We used   Aquamantys to work around the facet joints.  Local autograft from the same   incision was morselized and placed into the facet joints.  Perfect hemostasis   was achieved.  I placed a drain with only 2 holes in the subfascial place.    Muscular stitches were placed.  The fascia was closed by using 0 Vicryl   stitches and a running 0 Prolene stitch.  The wound was closed in anatomic    layers and a sterile dressing was applied.  The patient will be extubated and   brought to the recovery room.       ____________________________________     MD KATJA HINES / MINERVA    DD:  07/22/2019 12:45:08  DT:  07/22/2019 12:54:00    D#:  [id number]  Job#:  [id number]

## 2019-07-22 NOTE — ANESTHESIA PROCEDURE NOTES
Airway  Date/Time: 7/22/2019 9:51 AM  Performed by: ROSIO ART  Authorized by: ROSIO ART     Location:  OR  Urgency:  Elective  Indications for Airway Management:  Anesthesia  Spontaneous Ventilation: absent    Sedation Level:  Deep  Preoxygenated: Yes    Patient Position:  Sniffing  Mask Difficulty Assessment:  0 - not attempted  Final Airway Type:  Endotracheal airway  Final Endotracheal Airway:  ETT  Cuffed: Yes    Technique Used for Successful ETT Placement:  Direct laryngoscopy  Insertion Site:  Oral  Blade Type:  Sarah  Laryngoscope Blade/Videolaryngoscope Blade Size:  3  ETT Size (mm):  8.0  Measured from:  Teeth  ETT to Teeth (cm):  23  Placement Verified by: auscultation and capnometry    Cormack-Lehane Classification:  Grade IIa - partial view of glottis  Number of Attempts at Approach:  1

## 2019-07-22 NOTE — ANESTHESIA POSTPROCEDURE EVALUATION
Patient: Bartolo Garcia    Procedure Summary     Date:  07/22/19 Room / Location:  [facility] 07 / SURGERY [facility]    Anesthesia Start:  0947 Anesthesia Stop:  1238    Procedures:       FUSION, SPINE, LUMBAR, PLIF-L2-S1 ONLAY (N/A Spine Lumbar)      LAMINECTOMY, SPINE, LUMBAR, WITH DISCECTOMY (N/A Spine Lumbar) Diagnosis:  (LUMBAR STENOSIS)    Surgeon:  Tacos Forbes M.D. Responsible Provider:  Simone Arguello M.D.    Anesthesia Type:  general ASA Status:  2          Final Anesthesia Type: general  Last vitals  BP   Blood Pressure : 148/81, NIBP: 129/63    Temp   36.3 °C (97.3 °F)    Pulse   Pulse: (!) 58, Heart Rate (Monitored): (!) 58   Resp   16    SpO2   92 %      Anesthesia Post Evaluation    Patient location during evaluation: PACU  Patient participation: complete - patient participated  Level of consciousness: awake and alert  Pain score: 0    Airway patency: patent  Anesthetic complications: no  Cardiovascular status: hemodynamically stable  Respiratory status: acceptable  Hydration status: euvolemic    PONV: none           Nurse Pain Score: 0 (NPRS)

## 2019-07-22 NOTE — ANESTHESIA QCDR
2019 [facility] Clinical Data Registry (for Quality Improvement)     Postoperative nausea/vomiting risk protocol (Adult = 18 yrs and Pediatric 3-17 yrs)- (430 and 463)  General inhalation anesthetic (NOT TIVA) with PONV risk factors: Yes  Provision of anti-emetic therapy with at least 2 different classes of agents: Yes   Patient DID NOT receive anti-emetic therapy and reason is documented in Medical Record:  N/A    Multimodal Pain Management- (AQI59)  Patient undergoing Elective Surgery (i.e. Outpatient, or ASC, or Prescheduled Surgery prior to Hospital Admission): Yes  Use of Multimodal Pain Management, two or more drugs and/or interventions, NOT including systemic opioids: Yes   Exception: Documented allergy to multiple classes of analgesics:  N/A    PACU assessment of acute postoperative pain prior to Anesthesia Care End- Applies to Patients Age = 18- (ABG7)  Initial PACU pain score is which of the following: < 7/10  Patient unable to report pain score: N/A    Post-anesthetic transfer of care checklist/protocol to PACU/ICU- (426 and 427)  Upon conclusion of case, patient transferred to which of the following locations: PACU/Non-ICU  Use of transfer checklist/protocol: Yes  Exclusion: Service Performed in Patient Hospital Room (and thus did not require transfer): N/A    PACU Reintubation- (AQI31)  General anesthesia requiring endotracheal intubation (ETT) along with subsequent extubation in OR or PACU: Yes  Required reintubation in the PACU: No   Extubation was a planned trial documented in the medical record prior to removal of the original airway device:  N/A    Unplanned admission to ICU related to anesthesia service up through end of PACU care- (MD51)  Unplanned admission to ICU (not initially anticipated at anesthesia start time): No

## 2019-07-22 NOTE — OR NURSING
1333: Update given to pt. Wife via phone.    1545: Wife at bedside.    1700: Pt. Awake. Alert. Equal/Strong hand grasps bilateral. +Dorsal/Plantar flexion. Dressing to back C/D/I. Hemovac drain with sang output. Montero cath draining david urine into bag. Pt. tolerating PO intake well. No C/Os at this time. VSS.

## 2019-07-22 NOTE — ANESTHESIA TIME REPORT
Anesthesia Start and Stop Event Times     Date Time Event    7/22/2019 0947 Anesthesia Start     1238 Anesthesia Stop        Responsible Staff  07/22/19    Name Role Begin End    Simone Arguello M.D. Anesth 0947 1238        Preop Diagnosis (Free Text):  Pre-op Diagnosis     LUMBAR STENOSIS        Preop Diagnosis (Codes):  Diagnosis Information     Diagnosis Code(s):         Post op Diagnosis  Lumbar spinal stenosis      Premium Reason  Non-Premium    Comments:

## 2019-07-22 NOTE — PROGRESS NOTES
Med rec complete per pt at bedside  Interviewed pt with family at bedside with permission from pt  Allergies reviewed and updated.    Per list

## 2019-07-22 NOTE — OR SURGEON
Immediate Post OP Note    PreOp Diagnosis: L2-3 left disc herniation, L3-S1 stenosis    PostOp Diagnosis: same    Procedure(s):  FUSION, SPINE, LUMBAR, PLIF-L2-S1 ONLAY - Wound Class: Clean with Drain  LAMINECTOMY, SPINE, LUMBAR, WITH DISCECTOMY - Wound Class: Clean with Drain    Surgeon(s):  KEELEY Morales M.D.    Anesthesiologist/Type of Anesthesia:  Anesthesiologist: Simone Arguello M.D./General    Surgical Staff:  Circulator: Belinda Pires R.N.; Eleanor Barnes R.N.  Relief Scrub: Juan Miguel Partida  Scrub Person: Meredith Samson  Radiology Technologist: Aurora Machado    Specimens removed if any:  * No specimens in log *    Estimated Blood Loss: 300 cc    Findings: severe stenosis, large left L2,3 disc herniation    Complications: + durotomy repaired primarily.  SSEP's/emg's were stable throughout the case        7/22/2019 12:37 PM Tacos Forbes M.D.

## 2019-07-22 NOTE — ANESTHESIA PREPROCEDURE EVALUATION
Relevant Problems   (+) Cervical cord myelomalacia (HCC)   (+) Cervical stenosis of spine   (+) Hypertension   (+) S/P cervical spinal fusion   (+) Smoking history      Within Normal Limits   ANESTHESIA   ENDO   GI      PULMONARY       Physical Exam    Airway   Mallampati: II  TM distance: >3 FB  Neck ROM: full       Cardiovascular - normal exam  Rhythm: regular  Rate: normal  (-) murmur     Dental - normal exam  (+) upper dentures, lower dentures         Pulmonary - normal exam  Breath sounds clear to auscultation     Abdominal    Neurological - normal exam               Anesthesia Plan    ASA 2       Plan - general       Airway plan will be ETT        Induction: intravenous    Postoperative Plan: Postoperative administration of opioids is intended.    Pertinent diagnostic labs and testing reviewed    Informed Consent:    Anesthetic plan and risks discussed with patient.    Use of blood products discussed with: patient whom consented to blood products.

## 2019-07-23 LAB
ANION GAP SERPL CALC-SCNC: 5 MMOL/L (ref 0–11.9)
BUN SERPL-MCNC: 12 MG/DL (ref 8–22)
CALCIUM SERPL-MCNC: 8.5 MG/DL (ref 8.5–10.5)
CHLORIDE SERPL-SCNC: 104 MMOL/L (ref 96–112)
CO2 SERPL-SCNC: 29 MMOL/L (ref 20–33)
CREAT SERPL-MCNC: 0.73 MG/DL (ref 0.5–1.4)
ERYTHROCYTE [DISTWIDTH] IN BLOOD BY AUTOMATED COUNT: 46.3 FL (ref 35.9–50)
GLUCOSE SERPL-MCNC: 229 MG/DL (ref 65–99)
HCT VFR BLD AUTO: 37.8 % (ref 42–52)
HGB BLD-MCNC: 12.7 G/DL (ref 14–18)
MCH RBC QN AUTO: 32.6 PG (ref 27–33)
MCHC RBC AUTO-ENTMCNC: 33.6 G/DL (ref 33.7–35.3)
MCV RBC AUTO: 97.2 FL (ref 81.4–97.8)
PLATELET # BLD AUTO: 183 K/UL (ref 164–446)
PMV BLD AUTO: 9.3 FL (ref 9–12.9)
POTASSIUM SERPL-SCNC: 4.4 MMOL/L (ref 3.6–5.5)
RBC # BLD AUTO: 3.89 M/UL (ref 4.7–6.1)
SODIUM SERPL-SCNC: 138 MMOL/L (ref 135–145)
WBC # BLD AUTO: 8.7 K/UL (ref 4.8–10.8)

## 2019-07-23 PROCEDURE — 97165 OT EVAL LOW COMPLEX 30 MIN: CPT

## 2019-07-23 PROCEDURE — 80048 BASIC METABOLIC PNL TOTAL CA: CPT

## 2019-07-23 PROCEDURE — 700102 HCHG RX REV CODE 250 W/ 637 OVERRIDE(OP): Performed by: NEUROLOGICAL SURGERY

## 2019-07-23 PROCEDURE — 700111 HCHG RX REV CODE 636 W/ 250 OVERRIDE (IP): Performed by: NEUROLOGICAL SURGERY

## 2019-07-23 PROCEDURE — A9270 NON-COVERED ITEM OR SERVICE: HCPCS | Performed by: NEUROLOGICAL SURGERY

## 2019-07-23 PROCEDURE — 97161 PT EVAL LOW COMPLEX 20 MIN: CPT

## 2019-07-23 PROCEDURE — 85027 COMPLETE CBC AUTOMATED: CPT

## 2019-07-23 PROCEDURE — 36415 COLL VENOUS BLD VENIPUNCTURE: CPT

## 2019-07-23 PROCEDURE — 700112 HCHG RX REV CODE 229: Performed by: NEUROLOGICAL SURGERY

## 2019-07-23 PROCEDURE — 770001 HCHG ROOM/CARE - MED/SURG/GYN PRIV*

## 2019-07-23 RX ADMIN — ACETAMINOPHEN 1000 MG: 500 TABLET ORAL at 05:06

## 2019-07-23 RX ADMIN — DULOXETINE HYDROCHLORIDE 60 MG: 60 CAPSULE, DELAYED RELEASE ORAL at 05:06

## 2019-07-23 RX ADMIN — ACETAMINOPHEN 1000 MG: 500 TABLET ORAL at 11:19

## 2019-07-23 RX ADMIN — DOCUSATE SODIUM 100 MG: 100 CAPSULE, LIQUID FILLED ORAL at 16:53

## 2019-07-23 RX ADMIN — CEFAZOLIN SODIUM 2 G: 2 INJECTION, SOLUTION INTRAVENOUS at 03:17

## 2019-07-23 RX ADMIN — DEXAMETHASONE SODIUM PHOSPHATE 4 MG: 4 INJECTION, SOLUTION INTRA-ARTICULAR; INTRALESIONAL; INTRAMUSCULAR; INTRAVENOUS; SOFT TISSUE at 05:06

## 2019-07-23 RX ADMIN — DOCUSATE SODIUM 100 MG: 100 CAPSULE, LIQUID FILLED ORAL at 05:06

## 2019-07-23 RX ADMIN — ANTACID TABLETS 500 MG: 500 TABLET, CHEWABLE ORAL at 05:06

## 2019-07-23 RX ADMIN — CETIRIZINE HYDROCHLORIDE 10 MG: 10 TABLET, FILM COATED ORAL at 05:09

## 2019-07-23 RX ADMIN — ACETAMINOPHEN 1000 MG: 500 TABLET ORAL at 16:53

## 2019-07-23 RX ADMIN — FINASTERIDE 5 MG: 5 TABLET, FILM COATED ORAL at 05:06

## 2019-07-23 RX ADMIN — ANTACID TABLETS 500 MG: 500 TABLET, CHEWABLE ORAL at 16:53

## 2019-07-23 RX ADMIN — OXYCODONE HYDROCHLORIDE AND ACETAMINOPHEN 1000 MG: 500 TABLET ORAL at 16:53

## 2019-07-23 RX ADMIN — DEXAMETHASONE SODIUM PHOSPHATE 4 MG: 4 INJECTION, SOLUTION INTRA-ARTICULAR; INTRALESIONAL; INTRAMUSCULAR; INTRAVENOUS; SOFT TISSUE at 00:23

## 2019-07-23 RX ADMIN — SENNOSIDES, DOCUSATE SODIUM 1 TABLET: 50; 8.6 TABLET, FILM COATED ORAL at 21:04

## 2019-07-23 RX ADMIN — SIMVASTATIN 40 MG: 20 TABLET, FILM COATED ORAL at 16:53

## 2019-07-23 RX ADMIN — ACETAMINOPHEN 1000 MG: 500 TABLET ORAL at 00:22

## 2019-07-23 ASSESSMENT — COGNITIVE AND FUNCTIONAL STATUS - GENERAL
MOVING TO AND FROM BED TO CHAIR: A LITTLE
WALKING IN HOSPITAL ROOM: A LITTLE
SUGGESTED CMS G CODE MODIFIER MOBILITY: CK
SUGGESTED CMS G CODE MODIFIER DAILY ACTIVITY: CI
TURNING FROM BACK TO SIDE WHILE IN FLAT BAD: A LITTLE
MOBILITY SCORE: 18
HELP NEEDED FOR BATHING: A LITTLE
DAILY ACTIVITIY SCORE: 23
STANDING UP FROM CHAIR USING ARMS: A LITTLE
MOVING FROM LYING ON BACK TO SITTING ON SIDE OF FLAT BED: A LITTLE
CLIMB 3 TO 5 STEPS WITH RAILING: A LITTLE

## 2019-07-23 ASSESSMENT — GAIT ASSESSMENTS
DISTANCE (FEET): 400
GAIT LEVEL OF ASSIST: SUPERVISED
DEVIATION: INCREASED BASE OF SUPPORT;OTHER (COMMENT)
ASSISTIVE DEVICE: FRONT WHEEL WALKER

## 2019-07-23 ASSESSMENT — ACTIVITIES OF DAILY LIVING (ADL): TOILETING: INDEPENDENT

## 2019-07-23 NOTE — PROGRESS NOTES
Neurosurgery Progress Note    Subjective:  Doing well, seen and examined. Denies any positional headaches. States previous numbness in feet improved.    Exam:  AOx3, NAD  Frank, SLTI x4  Good strength in all 4  CN III-XII grossly intact  Dressing clean/dry/intact       BP  Min: 101/51  Max: 141/78  Pulse  Av.8  Min: 57  Max: 84  Resp  Av.3  Min: 12  Max: 20  Temp  Av.4 °C (97.5 °F)  Min: 36.1 °C (96.9 °F)  Max: 37.1 °C (98.7 °F)  SpO2  Av %  Min: 90 %  Max: 96 %    No Data Recorded    Recent Labs      19   WBC  8.7   RBC  3.89*   HEMOGLOBIN  12.7*   HEMATOCRIT  37.8*   MCV  97.2   MCH  32.6   MCHC  33.6*   RDW  46.3   PLATELETCT  183   MPV  9.3     Recent Labs      19   SODIUM  138   POTASSIUM  4.4   CHLORIDE  104   CO2  29   GLUCOSE  229*   BUN  12   CREATININE  0.73   CALCIUM  8.5               Intake/Output       19 0700 - 19 0659 19 07 - 19 0659      4359-3275 9042-8807 Total 2332-3314 1505-2665 Total       Intake    P.O.  350  375 725  --  -- --    P.O. 350 375 725 -- -- --    I.V.  0  --   --  -- --    Volume (mL) (lactated ringers infusion)  --  -- -- --    Total Intake 2270 375 2645 -- -- --       Output    Urine  425  1400 1825  --  -- --    Urine 225 -- 225 -- -- --    Output (mL) (Urethral Catheter Latex 16 Fr.) 200 1400 1600 -- -- --    Drains  130  60 190  --  -- --    Output (mL) ([REMOVED] Closed/Suction Drain 1 Posterior Back Hemovac) 130 60 190 -- -- --    Blood  250  -- 250  --  -- --    Est. Blood Loss 250 -- 250 -- -- --    Total Output 805 1460 2265 -- -- --       Net I/O     1465 -1085 380 -- -- --            Intake/Output Summary (Last 24 hours) at 19 0836  Last data filed at 19 0600   Gross per 24 hour   Intake             2645 ml   Output             2265 ml   Net              380 ml            • ascorbic acid  1,000 mg DAILY AT 1800   • cetirizine  10 mg DAILY   • DULoxetine  60 mg DAILY   •  finasteride  5 mg DAILY   • simvastatin  40 mg Q EVENING   • tizanidine  2 mg Q6HRS PRN   • Pharmacy Consult Request  1 Each PHARMACY TO DOSE   • MD ALERT...DO NOT ADMINISTER NSAIDS or ASPIRIN unless ORDERED By Neurosurgery  1 Each PRN   • docusate sodium  100 mg BID   • senna-docusate  1 Tab Nightly   • senna-docusate  1 Tab Q24HRS PRN   • polyethylene glycol/lytes  1 Packet BID PRN   • magnesium hydroxide  30 mL QDAY PRN   • bisacodyl  10 mg Q24HRS PRN   • fleet  1 Each Once PRN   • Respiratory Care per Protocol   Continuous RT   • NS   Continuous   • acetaminophen  1,000 mg Q6HRS   • oxyCODONE immediate-release  2.5 mg Q3HRS PRN   • morphine injection  2 mg Q3HRS PRN   • diphenhydrAMINE  25 mg Q6HRS PRN    Or   • diphenhydrAMINE  25 mg Q6HRS PRN   • ondansetron  4 mg Q4HRS PRN   • ondansetron  4 mg Q4HRS PRN   • methocarbamol  750 mg Q8HRS PRN   • zolpidem  5 mg HS PRN - MR X 1   • benzocaine-menthol  1 Lozenge Q2HRS PRN   • calcium carbonate  500 mg BID   • bisoprolol  10 mg Q DAY    And   • hydroCHLOROthiazide  6.25 mg Q DAY   • albuterol  2 Puff Q4HRS PRN       Assessment and Plan:  Hospital day # 2    POD# 1 S/P L2-S1 laminectomy with PLIF    Doing well  Can ambulate today  PT/OT  SCDs/IS  Pain control  DC planning, 1-2 days  Drain DC'd last night

## 2019-07-23 NOTE — PROGRESS NOTES
MOBILITY NOTE    Surgery patient?: Yes  Date of surgery: 7/22  Ambulated 50 ft on day of surgery? (N/A if today is not date of surgery):No  Number of times ambulated 50 feet or greater today: None  Patient has been up to chair, edge of bed or HOB 90 degrees for all meals?: N/A  Goal met? (goal is ambulating at least 50 feet 2 times on day shift, one time on night shift): No  If patient did not meet mobility goal, why?:patient with bedrest orders

## 2019-07-23 NOTE — THERAPY
"Occupational Therapy Evaluation completed.   Functional Status: Supine > EOB SBA, transfers with FWW supv/SBA, LB dressing supv without AE  Plan of Care: Patient with no further skilled OT needs in the acute care setting at this time  Discharge Recommendations:  Equipment: No Equipment Needed. Post-acute therapy: Anticipate that the patient will have no further occupational therapy needs after discharge from the hospital.     See \"Rehab Therapy-Acute\" Patient Summary Report for complete documentation.    78 y.o. male s/p L2-S1 PLIF, lami, diskectomy. Seen now for OT eval. Pt trained on safe body mechanics, neutral spine, compensatory techniques during functional activity, lifting restriction. Pt did require use of grab bar for sit to stand from low toilet. Reports he uses counter top and window sill for leverage from toilet at home. Pt has support as needed from spouse for I-ADL. Has shower chair and grab bar in place for seated bathing with supv from spouse. Pt is completing basic ADL and transfers with no more than SBA in this setting. Pt did desaturate on room air and demonstrated increased WOB with activity; he is currently requiring 3L O2. Denies feeling SOB. No further acute OT needs at this time.    "

## 2019-07-23 NOTE — THERAPY
"Physical Therapy Evaluation completed.   Bed Mobility:  Supine to Sit: Supervised  Transfers: Sit to Stand: Supervised  Gait: Level Of Assist: Supervised with Front-Wheel Walker       Plan of Care: Patient with no further skilled PT needs in the acute care setting at this time  Discharge Recommendations: Equipment: No Equipment Needed. Recommend outpatient physical therapy services to address higher level deficits when medically cleared.     See \"Rehab Therapy-Acute\" Patient Summary Report for complete documentation.     Pt is a 79yo male s/p L2-S1 PLIF, laminectomy, and discectomy by Dr. Forbes on 7/22 complicated by dural tear. Pt cleared for OOB mobility. No brace ordered. Educated and provided handout regarding post-op spinal precautions. Pt with previous neck sx in December 2017, able to demo log roll without cueing with HOB flat. Pt performed all mobility at SPV level including ambulating 400ft with FWW with no LOB and negotiating 2 steps with reciprocal gait no LOB. Pt reports spouse is able to assist at home as needed and can assist on the stairs. Pt reports no concerns regarding return home. Pt with no further acute PT needs, recommend pt continue to ambulate with nursing as able and recommend outpatient PT when medically cleared to assist in return to PLOF. Patient will not be actively followed for physical therapy services at this time, however may be seen if requested by physician for 1 more visit within 30 days to address any discharge or equipment needs.   "

## 2019-07-23 NOTE — PROGRESS NOTES
2 RN skin check complete with FELIX Akhtar  Devices in place SCD's, peripheral IV, Nasal Canula   Skin assessed under devices Yes  Confirmed pressure ulcers found on none  New potential pressure ulcers noted on N/A Wound consult placed N/A  The following interventions in place patient repositions self frequently    Skin tear noted to R arm, covered in gauze/tegaderm. Surgical incision clean dry and intact with gauze and tape.

## 2019-07-23 NOTE — DISCHARGE PLANNING
Care Transition Team Assessment    Information Source  Orientation : Oriented x 4  Who is responsible for making decisions for patient? : Patient         Elopement Risk  Legal Hold: No  Ambulatory or Self Mobile in Wheelchair: Yes  Disoriented: No  Psychiatric Symptoms: None  History of Wandering: No  Elopement this Admit: No  Vocalizing Wanting to Leave: No  Displays Behaviors, Body Language Wanting to Leave: No-Not at Risk for Elopement  Elopement Risk: Not at Risk for Elopement    Interdisciplinary Discharge Planning  Does Admitting Nurse Feel This Could be a Complex Discharge?: No  Primary Care Physician: Dr. Baptiste  Lives with - Patient's Self Care Capacity: Spouse  Patient or legal guardian wants to designate a caregiver (see row info): No  Support Systems: Spouse / Significant Other  Housing / Facility: 1 Story House (has 2 steps)  Do You Take your Prescribed Medications Regularly: Yes  Able to Return to Previous ADL's: No  Mobility Issues: Yes  Prior Services: Home-Independent  Patient Expects to be Discharged to:: Home  Assistance Needed: Yes  Durable Medical Equipment: Walker, Other - Specify (has a cane)    Discharge Preparedness  What is your plan after discharge?: Home with help  What are your discharge supports?: Spouse  Prior Functional Level: Ambulatory, Drives Self, Independent with Activities of Daily Living, Independent with Medication Management  Difficulity with ADLs: Walking  Difficulity with IADLs: Driving    Functional Assesment  Prior Functional Level: Ambulatory, Drives Self, Independent with Activities of Daily Living, Independent with Medication Management    Finances  Financial Barriers to Discharge: No  Prescription Coverage: Yes    Vision / Hearing Impairment  Right Eye Vision: Impaired, Wears Glasses  Left Eye Vision: Impaired, Wears Glasses  Hearing Impairment: Right Ear, Left Ear, Hearing Device(s) Available              Domestic Abuse  Have you ever been the victim of abuse or  violence?: No  Physical Abuse or Sexual Abuse: No  Verbal Abuse or Emotional Abuse: No  Possible Abuse Reported to:: Not Applicable              Anticipated Discharge Information  Anticipated discharge disposition: Home

## 2019-07-23 NOTE — THERAPY
OT referral received. Pt has bed rest order. Will complete OT eval when pt cleared for OOB activity.

## 2019-07-23 NOTE — DISCHARGE PLANNING
Anticipated Discharge Disposition:   home    Action:   Met with pt. He lives with wife. He has a walker and cane at home. Uses walker inside the home. He was at [facility] about a year ago. Stated that he walker with PT and did well. Noted that PT recommends outpt therapy. Recommends no further OT needs.     Assessment completed.     Barriers to Discharge:   Surgical clearance    Plan:   RN kindly notify CM if there are any discharge concerns.

## 2019-07-24 VITALS
DIASTOLIC BLOOD PRESSURE: 80 MMHG | OXYGEN SATURATION: 90 % | HEIGHT: 70 IN | RESPIRATION RATE: 17 BRPM | BODY MASS INDEX: 23.23 KG/M2 | TEMPERATURE: 98.3 F | SYSTOLIC BLOOD PRESSURE: 155 MMHG | HEART RATE: 61 BPM | WEIGHT: 162.26 LBS

## 2019-07-24 PROBLEM — M48.061 LUMBAR STENOSIS: Status: ACTIVE | Noted: 2019-07-24

## 2019-07-24 LAB
ANION GAP SERPL CALC-SCNC: 6 MMOL/L (ref 0–11.9)
BUN SERPL-MCNC: 11 MG/DL (ref 8–22)
CALCIUM SERPL-MCNC: 8.5 MG/DL (ref 8.5–10.5)
CHLORIDE SERPL-SCNC: 106 MMOL/L (ref 96–112)
CO2 SERPL-SCNC: 28 MMOL/L (ref 20–33)
CREAT SERPL-MCNC: 0.65 MG/DL (ref 0.5–1.4)
ERYTHROCYTE [DISTWIDTH] IN BLOOD BY AUTOMATED COUNT: 47.6 FL (ref 35.9–50)
GLUCOSE SERPL-MCNC: 125 MG/DL (ref 65–99)
HCT VFR BLD AUTO: 40.1 % (ref 42–52)
HGB BLD-MCNC: 13.4 G/DL (ref 14–18)
MCH RBC QN AUTO: 32.9 PG (ref 27–33)
MCHC RBC AUTO-ENTMCNC: 33.4 G/DL (ref 33.7–35.3)
MCV RBC AUTO: 98.5 FL (ref 81.4–97.8)
PLATELET # BLD AUTO: 189 K/UL (ref 164–446)
PMV BLD AUTO: 9.7 FL (ref 9–12.9)
POTASSIUM SERPL-SCNC: 3.8 MMOL/L (ref 3.6–5.5)
RBC # BLD AUTO: 4.07 M/UL (ref 4.7–6.1)
SODIUM SERPL-SCNC: 140 MMOL/L (ref 135–145)
WBC # BLD AUTO: 10.8 K/UL (ref 4.8–10.8)

## 2019-07-24 PROCEDURE — A9270 NON-COVERED ITEM OR SERVICE: HCPCS | Performed by: NEUROLOGICAL SURGERY

## 2019-07-24 PROCEDURE — A9270 NON-COVERED ITEM OR SERVICE: HCPCS | Performed by: NURSE PRACTITIONER

## 2019-07-24 PROCEDURE — 306598 HCHG EXT PANELS 5.5 BACK SUPP

## 2019-07-24 PROCEDURE — 36415 COLL VENOUS BLD VENIPUNCTURE: CPT

## 2019-07-24 PROCEDURE — 80048 BASIC METABOLIC PNL TOTAL CA: CPT

## 2019-07-24 PROCEDURE — L0458 TLSO 2MOD SYMPHIS-XIPHO PRE: HCPCS

## 2019-07-24 PROCEDURE — 700102 HCHG RX REV CODE 250 W/ 637 OVERRIDE(OP): Performed by: NURSE PRACTITIONER

## 2019-07-24 PROCEDURE — 700112 HCHG RX REV CODE 229: Performed by: NEUROLOGICAL SURGERY

## 2019-07-24 PROCEDURE — 700102 HCHG RX REV CODE 250 W/ 637 OVERRIDE(OP): Performed by: NEUROLOGICAL SURGERY

## 2019-07-24 PROCEDURE — L0464 TLSO 4MOD SACRO-SCAP PRE: HCPCS

## 2019-07-24 PROCEDURE — 85027 COMPLETE CBC AUTOMATED: CPT

## 2019-07-24 RX ADMIN — HYDROCHLOROTHIAZIDE 6.25 MG: 25 TABLET ORAL at 05:07

## 2019-07-24 RX ADMIN — ACETAMINOPHEN 1000 MG: 500 TABLET ORAL at 05:08

## 2019-07-24 RX ADMIN — MAGNESIUM HYDROXIDE 30 ML: 400 SUSPENSION ORAL at 09:16

## 2019-07-24 RX ADMIN — ANTACID TABLETS 500 MG: 500 TABLET, CHEWABLE ORAL at 05:07

## 2019-07-24 RX ADMIN — ACETAMINOPHEN 1000 MG: 500 TABLET ORAL at 12:33

## 2019-07-24 RX ADMIN — CETIRIZINE HYDROCHLORIDE 10 MG: 10 TABLET, FILM COATED ORAL at 05:07

## 2019-07-24 RX ADMIN — BISOPROLOL FUMARATE 10 MG: 5 TABLET ORAL at 05:09

## 2019-07-24 RX ADMIN — DULOXETINE HYDROCHLORIDE 60 MG: 60 CAPSULE, DELAYED RELEASE ORAL at 05:07

## 2019-07-24 RX ADMIN — POLYETHYLENE GLYCOL 3350 1 PACKET: 17 POWDER, FOR SOLUTION ORAL at 12:37

## 2019-07-24 RX ADMIN — FINASTERIDE 5 MG: 5 TABLET, FILM COATED ORAL at 05:07

## 2019-07-24 RX ADMIN — DOCUSATE SODIUM 100 MG: 100 CAPSULE, LIQUID FILLED ORAL at 05:07

## 2019-07-24 NOTE — PROGRESS NOTES
"Discharge instructions and prescriptions reviewed with patient. Questions and concerns denied at this time. IV removed with no complications. Vitals stable. Patient discharged via wheelchair with all personal belongings and prescriptions.    /80   Pulse 61   Temp 36.8 °C (98.3 °F) (Temporal)   Resp 17   Ht 1.778 m (5' 10\")   Wt 73.6 kg (162 lb 4.1 oz)   SpO2 90%   BMI 23.28 kg/m²     "

## 2019-07-24 NOTE — DISCHARGE SUMMARY
DATE OF ADMISSION:  07/22/2019    DATE OF DISCHARGE:  07/24/2019    ADMITTING DIAGNOSES:  1.  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  2.  L3 through S1 stenosis, left greater than right with associated   radiculopathy and neurogenic claudication.    COURSE OF HOSPITALIZATION:  This patient was admitted to the [facility] on 07/22/2019 to undergo a left L2-L3 laminectomy with   microdiskectomy.  A L3 through S1 decompressive laminectomy, facetectomy, and   foraminotomies were performed with a L2 through S1 posterior onlay fusion with   local autograft.  A dural defect was repaired without any complications.    Today, on the day of discharge, the patient states that he has improved lower   extremity sensation.  He is ambulatory without any assistive device here at   the hospital.  He does have a cane and front wheel walker at home as he still   has generalized gait and balance deficits since his cervical spine surgery   approximately 1 week ago.    The patient denies any spinal headache.  His incision is clean, dry, and   intact without any visible or palpable swelling.  Self-resorbing sutures are   in place.  When tested in the seated position, he has full lower extremity   strength.  There are some limitations to his right leg.  He has advanced right   degenerative knee disease and is pending a right total knee replacement.    DISCHARGE MEDICATIONS:  Include:  1.  Medrol Dosepak.  2.  Percocet 5 mg, #60.  3.  Flexeril 10 mg #30.    IMPRESSION AND PLAN:  1.  Lumbar stenosis L3 through S1, left L2-L3 disk herniation, status post   left L2-L3 microdiskectomy, left laminectomy and L3 through S1 decompression   with onlay fusion with repair of dural tear.  Postoperative course was benign.  2.  Postoperative acute pain, well controlled.    FOLLOWUP:  1.  The patient will follow up at 2 weeks postop in the outpatient clinic.  2.  Aspirin prophylactic therapy to be resumed at 1 week postop.        ____________________________________     MATTHEW PETER    DD:  07/24/2019 09:40:39  DT:  07/24/2019 11:51:55    D#:  [id number]  Job#:  [id number]    cc: OPAL STEVENS MD

## 2019-07-24 NOTE — RESPIRATORY CARE
COPD EDUCATION by COPD CLINICAL EDUCATOR  7/24/2019 at 7:09 AM by Renate Hernandez     Patient reviewed by COPD education team. Patient does not have a diagnosis of COPD and is a non-smoker, therefore does not qualify for the COPD program.

## 2019-07-24 NOTE — DISCHARGE INSTRUCTIONS
Discharge Instructions    Discharge instructions: NO ASA for 7 days post op. No NSAID for 2 weeks May continue Zanaflex for muscle spasms, or use Flexeril per new prescription as needed. Incision care as discussed. Wear brace when up and walking.    Discharged to home by car with relative. Discharged via wheelchair, hospital escort: Yes.  Special equipment needed: Not Applicable    Be sure to schedule a follow-up appointment with your primary care doctor or any specialists as instructed.     Discharge Plan:   Smoking Cessation Offered: Patient Counseled  Influenza Vaccine Indication: Not indicated: Previously immunized this influenza season and > 8 years of age    I understand that a diet low in cholesterol, fat, and sodium is recommended for good health. Unless I have been given specific instructions below for another diet, I accept this instruction as my diet prescription.   Other diet: Regular    Special Instructions: None    · Is patient discharged on Warfarin / Coumadin?   No     Depression / Suicide Risk    As you are discharged from this [facility] facility, it is important to learn how to keep safe from harming yourself.    Recognize the warning signs:  · Abrupt changes in personality, positive or negative- including increase in energy   · Giving away possessions  · Change in eating patterns- significant weight changes-  positive or negative  · Change in sleeping patterns- unable to sleep or sleeping all the time   · Unwillingness or inability to communicate  · Depression  · Unusual sadness, discouragement and loneliness  · Talk of wanting to die  · Neglect of personal appearance   · Rebelliousness- reckless behavior  · Withdrawal from people/activities they love  · Confusion- inability to concentrate     If you or a loved one observes any of these behaviors or has concerns about self-harm, here's what you can do:  · Talk about it- your feelings and reasons for harming yourself  · Remove any means that  you might use to hurt yourself (examples: pills, rope, extension cords, firearm)  · Get professional help from the community (Mental Health, Substance Abuse, psychological counseling)  · Do not be alone:Call your Safe Contact- someone whom you trust who will be there for you.  · Call your local CRISIS HOTLINE 570-6168 or 809-140-3084  · Call your local Children's Mobile Crisis Response Team [place] (673) 710-7382 or www.Zaask  · Call the toll free National Suicide Prevention Hotlines   · National Suicide Prevention Lifeline 590-802-PBPI (2643)  · National Hope Line Network 800-SUICIDE (414-9110)

## 2021-01-13 DIAGNOSIS — Z23 NEED FOR VACCINATION: ICD-10-CM

## 2021-01-29 PROBLEM — M43.22 CERVICAL VERTEBRAL FUSION: Status: ACTIVE | Noted: 2021-01-29

## 2021-01-29 PROBLEM — N40.1 BENIGN PROSTATIC HYPERPLASIA WITH LOWER URINARY TRACT SYMPTOMS: Status: ACTIVE | Noted: 2021-01-29

## 2021-01-29 PROBLEM — Z97.4 USES HEARING AID: Status: ACTIVE | Noted: 2021-01-29

## 2021-01-29 PROBLEM — J30.2 SEASONAL ALLERGIES: Status: ACTIVE | Noted: 2021-01-29

## 2021-01-29 PROBLEM — Z96.651 HISTORY OF TOTAL RIGHT KNEE REPLACEMENT (TKR): Status: ACTIVE | Noted: 2021-01-29

## 2021-01-29 PROBLEM — R26.89 BALANCE DISORDER: Status: ACTIVE | Noted: 2021-01-29

## 2021-01-29 PROBLEM — J44.9 COPD (CHRONIC OBSTRUCTIVE PULMONARY DISEASE) (HCC): Status: ACTIVE | Noted: 2021-01-29

## 2021-08-24 PROBLEM — R73.03 PREDIABETES: Status: ACTIVE | Noted: 2021-08-24

## 2021-11-16 PROBLEM — Z99.81 OXYGEN DEPENDENT: Status: ACTIVE | Noted: 2021-11-16

## 2022-02-25 PROBLEM — Z63.4 BEREAVEMENT: Status: ACTIVE | Noted: 2022-02-25

## 2022-09-09 PROBLEM — R91.8 PULMONARY NODULES: Status: ACTIVE | Noted: 2022-09-09

## 2023-01-13 PROBLEM — E78.5 DYSLIPIDEMIA: Status: ACTIVE | Noted: 2023-01-13

## 2023-10-22 PROBLEM — J96.91 RESPIRATORY FAILURE WITH HYPOXIA (HCC): Status: ACTIVE | Noted: 2023-10-22

## 2023-10-22 PROBLEM — N17.9 AKI (ACUTE KIDNEY INJURY) (HCC): Status: ACTIVE | Noted: 2023-10-22

## 2023-10-22 PROBLEM — J96.21 ACUTE ON CHRONIC RESPIRATORY FAILURE WITH HYPOXIA (HCC): Status: ACTIVE | Noted: 2023-10-22

## 2023-10-22 PROBLEM — I50.9 HEART FAILURE, UNSPECIFIED (HCC): Status: ACTIVE | Noted: 2023-10-22

## 2023-10-22 PROBLEM — R65.21 SEPTIC SHOCK (HCC): Status: ACTIVE | Noted: 2023-10-22

## 2023-10-22 PROBLEM — R79.89 LFT ELEVATION: Status: ACTIVE | Noted: 2023-10-22

## 2023-10-22 PROBLEM — Z78.9 FULL CODE STATUS: Status: ACTIVE | Noted: 2023-10-22

## 2023-10-22 PROBLEM — E87.20 LACTIC ACIDOSIS: Status: ACTIVE | Noted: 2023-10-22

## 2023-10-22 PROBLEM — J18.9 COMMUNITY ACQUIRED PNEUMONIA OF RIGHT LOWER LOBE OF LUNG: Status: ACTIVE | Noted: 2023-10-22

## 2023-10-22 PROBLEM — R79.89 ELEVATED TROPONIN: Status: ACTIVE | Noted: 2023-10-22

## 2023-10-22 PROBLEM — A41.9 SEPTIC SHOCK (HCC): Status: ACTIVE | Noted: 2023-10-22

## 2023-10-24 PROBLEM — I50.41 ACUTE COMBINED SYSTOLIC AND DIASTOLIC HEART FAILURE (HCC): Status: ACTIVE | Noted: 2023-10-22

## 2023-10-28 PROBLEM — I48.91 ATRIAL FIBRILLATION WITH RVR (HCC): Status: ACTIVE | Noted: 2023-10-28

## 2023-10-29 PROBLEM — A41.9 SEPSIS (HCC): Status: ACTIVE | Noted: 2023-10-29

## 2023-10-30 PROBLEM — R53.1 GENERALIZED WEAKNESS: Status: ACTIVE | Noted: 2023-10-30

## 2023-11-01 PROBLEM — E87.0 HYPERNATREMIA: Status: ACTIVE | Noted: 2023-11-01

## 2023-11-01 PROBLEM — J44.1 COPD WITH EXACERBATION (HCC): Status: ACTIVE | Noted: 2021-01-29

## 2023-11-01 PROBLEM — Z71.89 ACP (ADVANCE CARE PLANNING): Status: ACTIVE | Noted: 2017-12-21

## 2023-11-02 PROBLEM — R73.9 HYPERGLYCEMIA: Status: ACTIVE | Noted: 2023-11-02

## 2023-11-02 PROBLEM — J84.10 PULMONARY FIBROSIS, UNSPECIFIED (HCC): Status: ACTIVE | Noted: 2023-11-02

## 2023-11-02 PROBLEM — R06.89 HYPERCAPNIA: Status: ACTIVE | Noted: 2023-11-02

## 2023-11-06 PROBLEM — R74.8 ELEVATED LIVER ENZYMES: Status: ACTIVE | Noted: 2023-11-06

## 2023-11-30 ENCOUNTER — HOSPITAL ENCOUNTER (INPATIENT)
Facility: REHABILITATION | Age: 83
End: 2023-11-30
Attending: PHYSICAL MEDICINE & REHABILITATION | Admitting: PHYSICAL MEDICINE & REHABILITATION
Payer: MEDICARE

## (undated) DEVICE — MIDAS LUBRICATOR DIFFUSER PACK (4EA/CA)

## (undated) DEVICE — GLOVE BIOGEL INDICATOR SZ 8.5 SURGICAL PF LTX - (50/BX 4BX/CA)

## (undated) DEVICE — SUTURE 0 PROLENE M06 C/R (12EA/BX)

## (undated) DEVICE — SUTURE 4-0 MONOCRYL PLUS PS-2 - 27 INCH (36/BX)

## (undated) DEVICE — NEEDLE NON SAFETY HYPO 22 GA X 1 1/2 IN (100/BX)

## (undated) DEVICE — TAPE CLOTH MEDIPORE 6 INCH - (12RL/CA)

## (undated) DEVICE — SET EXTENSION WITH 2 PORTS (48EA/CA) ***PART #2C8610 IS A SUBSTITUTE*****

## (undated) DEVICE — BONE MILL BM210

## (undated) DEVICE — LACTATED RINGERS INJ. 500 ML - (24EA/CA)

## (undated) DEVICE — ARMREST CRADLE FOAM - (2PR/PK 12PR/CA)

## (undated) DEVICE — SENSOR SPO2 NEO LNCS ADHESIVE (20/BX) SEE USER NOTES

## (undated) DEVICE — GOWN WARMING STANDARD FLEX - (30/CA)

## (undated) DEVICE — GLOVE BIOGEL SZ 8 SURGICAL PF LTX - (50PR/BX 4BX/CA)

## (undated) DEVICE — MASK ANESTHESIA ADULT  - (100/CA)

## (undated) DEVICE — SYRINGE SAFETY 10 ML 18 GA X 1 1/2 BLUNT LL (100/BX 4BX/CA)

## (undated) DEVICE — DRAPE LARGE 3 QUARTER - (20/CA)

## (undated) DEVICE — DRAPE 36X28IN RAD CARM BND BG - (25/CA) O

## (undated) DEVICE — KIT GEL-FLOW NT ABORBABLE GELATIN (6EA/BX)

## (undated) DEVICE — HEADREST PRONEVIEW LARGE - (10/CA)

## (undated) DEVICE — APPLICATOR COTTON TIP 6 IN - STERILE (2EA/PK 100PK/BX)

## (undated) DEVICE — PACK JACKSON TABLE KIT W/OUT - HR (6EA/CA)

## (undated) DEVICE — PATTIES SURG X-RAYCOTTONOID - 1/2 X 3 IN (200/CA)

## (undated) DEVICE — TUBING CLEARLINK DUO-VENT - C-FLO (48EA/CA)

## (undated) DEVICE — CHLORAPREP 26 ML APPLICATOR - ORANGE TINT(25/CA)

## (undated) DEVICE — SPONGE GAUZESTER 4 X 4 4PLY - (128PK/CA)

## (undated) DEVICE — GOWN SURGEONS LARGE - (32/CA)

## (undated) DEVICE — DRAPE LAPAROTOMY T SHEET - (12EA/CA)

## (undated) DEVICE — SUTURE 3-0 VICRYL PLUS RB-1 - 8 X 18 INCH (12/BX)

## (undated) DEVICE — DISSECT TOOL MIDAS 14BA50

## (undated) DEVICE — GLOVE BIOGEL PI INDICATOR SZ 7.0 SURGICAL PF LF - (50/BX 4BX/CA)

## (undated) DEVICE — GLOVE, BIOGEL ECLIPSE, SZ 7.0, PF LTX (50/BX)

## (undated) DEVICE — SURGIFOAM (12X7) - (12EA/CA)

## (undated) DEVICE — CANISTER SUCTION 3000ML MECHANICAL FILTER AUTO SHUTOFF MEDI-VAC NONSTERILE LF DISP  (40EA/CA)

## (undated) DEVICE — BOVIE BLADE COATED &INSULATED - 25/PK

## (undated) DEVICE — SUCTION INSTRUMENT YANKAUER BULBOUS TIP W/O VENT (50EA/CA)

## (undated) DEVICE — KIT ANESTHESIA W/CIRCUIT & 3/LT BAG W/FILTER (20EA/CA)

## (undated) DEVICE — BIT DRILL 2.4MM

## (undated) DEVICE — TUBE E-T HI-LO CUFF 8.0MM (10EA/PK)

## (undated) DEVICE — SLEEVE, VASO, THIGH, MED

## (undated) DEVICE — STERI STRIP COMPOUND BENZOIN - TINCTURE 0.6ML WITH APPLICATOR (40EA/BX)

## (undated) DEVICE — SUTURE 0 VICRYL PLUS CT-1 - 8 X 18 INCH (12/BX)

## (undated) DEVICE — SUTURE 4-0 NUROLON CR/8 TF - (12/BX) ETHICON

## (undated) DEVICE — TRAY CATHETER FOLEY URINE METER W/STATLOCK 350ML (10EA/CA)

## (undated) DEVICE — DRESSING PETROLEUM GAUZE 5 X 9" (50EA/BX 4BX/CA)"

## (undated) DEVICE — NEEDLE SPINAL NON-SAFETY 18 GA X 3 IN (25EA/BX)

## (undated) DEVICE — SUTURE GENERAL

## (undated) DEVICE — KIT EVACUATER 3 SPRING PVC LF 1/8 DRAIN SIZE (10EA/CA)"

## (undated) DEVICE — DRESSING TRANSPARENT FILM TEGADERM 4 X 4.75" (50EA/BX)"

## (undated) DEVICE — KIT SURGIFLO W/OUT THROMBIN - (6EA/CA)

## (undated) DEVICE — SUTURE 2-0 VICRYL PLUS CT-1 - 8 X 18 INCH(12/BX)

## (undated) DEVICE — DRESSING XEROFORM 1X8 - (50/BX 4BX/CA)

## (undated) DEVICE — KIT ROOM DECONTAMINATION

## (undated) DEVICE — ELECTRODE 850 FOAM ADHESIVE - HYDROGEL RADIOTRNSPRNT (50/PK)

## (undated) DEVICE — CATHETER IV 20 GA X 1-1/4 ---SURG.& SDS ONLY--- (50EA/BX)

## (undated) DEVICE — TOOL DISSECT MATCH HEAD

## (undated) DEVICE — GLOVE SZ 8 BIOGEL PI MICRO - PF LF (50PR/BX)

## (undated) DEVICE — SET LEADWIRE 5 LEAD BEDSIDE DISPOSABLE ECG (1SET OF 5/EA)

## (undated) DEVICE — HEMOSTAT SURG ABSORBABLE - 4 X 8 IN SURGICEL (24EA/CA)

## (undated) DEVICE — CLOSURE SKIN STRIP 1/2 X 4 IN - (STERI STRIP) (50/BX 4BX/CA)

## (undated) DEVICE — SHEET PEDIATRIC LAPAROTOMY - (10/CA)

## (undated) DEVICE — DRAPE STRLE REG TOWEL 18X24 - (10/BX 4BX/CA)"

## (undated) DEVICE — NEPTUNE 4 PORT MANIFOLD - (20/PK)

## (undated) DEVICE — LACTATED RINGERS INJ 1000 ML - (14EA/CA 60CA/PF)

## (undated) DEVICE — SODIUM CHL IRRIGATION 0.9% 1000ML (12EA/CA)

## (undated) DEVICE — TUBING C&T SET FLYING LEADS DRAIN TUBING (10EA/BX)

## (undated) DEVICE — HEMOSTAT SURG ABSORBABLE - 2 X 3 IN SURGICEL (24EA/CA)

## (undated) DEVICE — SEALER BIPOLAR 2.3 AQUAMANTYS

## (undated) DEVICE — INTRAOP NEURO IN OR 1:1 PER 15 MIN

## (undated) DEVICE — SYRINGE SAFETY 3 ML 18 GA X 1 1/2 BLUNT LL (100/BX 8BX/CA)

## (undated) DEVICE — DEVICE MONOPOLAR RF PEAK PLASMABLADE 3.0S

## (undated) DEVICE — PACK NEURO - (2EA/CA)

## (undated) DEVICE — GLOVE BIOGEL PI INDICATOR SZ 8.0 SURGICAL PF LF -(50/BX 4BX/CA)

## (undated) DEVICE — SPONGE GAUZE STER 4X4 8-PL - (2/PK 50PK/BX 12BX/CS)

## (undated) DEVICE — GOWN SURGEONS X-LARGE - DISP. (30/CA)

## (undated) DEVICE — SURGIFOAM (SIZE 100) - (6EA/CA)

## (undated) DEVICE — DISSECT TOOL MIDAS F2/8TA23

## (undated) DEVICE — GLOVE BIOGEL SZ 7 SURGICAL PF LTX - (50PR/BX 4BX/CA)

## (undated) DEVICE — TUBE CONNECT SUCTION CLEAR 120 X 1/4" (50EA/CA)"

## (undated) DEVICE — PROTECTOR ULNA NERVE - (36PR/CA)

## (undated) DEVICE — COVER MAYO STAND X-LG - (22EA/CA)

## (undated) DEVICE — ELECTRODE DUAL RETURN W/ CORD - (50/PK)